# Patient Record
Sex: MALE | Race: OTHER | HISPANIC OR LATINO | Employment: UNEMPLOYED | URBAN - METROPOLITAN AREA
[De-identification: names, ages, dates, MRNs, and addresses within clinical notes are randomized per-mention and may not be internally consistent; named-entity substitution may affect disease eponyms.]

---
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
## 2022-01-05 ENCOUNTER — HOSPITAL ENCOUNTER (INPATIENT)
Facility: HOSPITAL | Age: 22
LOS: 12 days | Discharge: HOME | DRG: 750 | End: 2022-01-18
Attending: EMERGENCY MEDICINE | Admitting: STUDENT IN AN ORGANIZED HEALTH CARE EDUCATION/TRAINING PROGRAM
Payer: COMMERCIAL

## 2022-01-05 DIAGNOSIS — Z00.8 ENCOUNTER FOR PSYCHOLOGICAL EVALUATION: Primary | ICD-10-CM

## 2022-01-05 DIAGNOSIS — G47.00 INSOMNIA: ICD-10-CM

## 2022-01-05 DIAGNOSIS — F19.959 SUBSTANCE-INDUCED PSYCHOTIC DISORDER (HCC): ICD-10-CM

## 2022-01-05 DIAGNOSIS — F20.9 SCHIZOPHRENIA (HCC): ICD-10-CM

## 2022-01-05 LAB
ETHANOL EXG-MCNC: 0 MG/DL
FLUAV RNA RESP QL NAA+PROBE: NEGATIVE
FLUBV RNA RESP QL NAA+PROBE: NEGATIVE
RSV RNA RESP QL NAA+PROBE: NEGATIVE
SARS-COV-2 RNA RESP QL NAA+PROBE: NEGATIVE

## 2022-01-05 PROCEDURE — 96372 THER/PROPH/DIAG INJ SC/IM: CPT

## 2022-01-05 PROCEDURE — 99285 EMERGENCY DEPT VISIT HI MDM: CPT

## 2022-01-05 PROCEDURE — 99285 EMERGENCY DEPT VISIT HI MDM: CPT | Performed by: EMERGENCY MEDICINE

## 2022-01-05 PROCEDURE — 82075 ASSAY OF BREATH ETHANOL: CPT | Performed by: EMERGENCY MEDICINE

## 2022-01-05 PROCEDURE — 0241U HB NFCT DS VIR RESP RNA 4 TRGT: CPT | Performed by: EMERGENCY MEDICINE

## 2022-01-05 RX ORDER — HALOPERIDOL 5 MG
5 TABLET ORAL ONCE
Status: COMPLETED | OUTPATIENT
Start: 2022-01-05 | End: 2022-01-05

## 2022-01-05 RX ORDER — BENZTROPINE MESYLATE 1 MG/1
1 TABLET ORAL ONCE
Status: COMPLETED | OUTPATIENT
Start: 2022-01-05 | End: 2022-01-05

## 2022-01-05 RX ORDER — HALOPERIDOL 5 MG/ML
10 INJECTION INTRAMUSCULAR ONCE
Status: COMPLETED | OUTPATIENT
Start: 2022-01-05 | End: 2022-01-05

## 2022-01-05 RX ORDER — LORAZEPAM 2 MG/ML
2 INJECTION INTRAMUSCULAR ONCE
Status: COMPLETED | OUTPATIENT
Start: 2022-01-05 | End: 2022-01-05

## 2022-01-05 RX ORDER — LORAZEPAM 1 MG/1
2 TABLET ORAL ONCE
Status: COMPLETED | OUTPATIENT
Start: 2022-01-05 | End: 2022-01-05

## 2022-01-05 RX ORDER — BENZTROPINE MESYLATE 1 MG/ML
2 INJECTION INTRAMUSCULAR; INTRAVENOUS ONCE
Status: COMPLETED | OUTPATIENT
Start: 2022-01-05 | End: 2022-01-05

## 2022-01-05 RX ADMIN — BENZTROPINE MESYLATE 2 MG: 1 INJECTION INTRAMUSCULAR; INTRAVENOUS at 03:52

## 2022-01-05 RX ADMIN — BENZTROPINE MESYLATE 1 MG: 1 TABLET ORAL at 23:20

## 2022-01-05 RX ADMIN — HALOPERIDOL LACTATE 10 MG: 5 INJECTION, SOLUTION INTRAMUSCULAR at 03:52

## 2022-01-05 RX ADMIN — HALOPERIDOL 5 MG: 5 TABLET ORAL at 23:20

## 2022-01-05 RX ADMIN — LORAZEPAM 2 MG: 2 INJECTION INTRAMUSCULAR; INTRAVENOUS at 03:52

## 2022-01-05 RX ADMIN — LORAZEPAM 2 MG: 1 TABLET ORAL at 23:20

## 2022-01-05 NOTE — ED NOTES
Per [facility] ([facility]) AT [place]- Call back when patient wakes up     9:52am- Dutch Beech to check on patient- Still asleep  1:1 reports wakes up intermittently but mostly asleep  1:1 will contact  Crisis when patient wakes up      Bharath Gerber  Crisis Worker, [place]  01/05/22

## 2022-01-05 NOTE — ED NOTES
[facility] Crisis to inform them that the patient is awake  Letty Null reported she will be over to see the patient shortly      Georgette Alvarez  Crisis Worker, [place]  01/05/22

## 2022-01-05 NOTE — ED NOTES
Awakened attempt to urinate; states in ER, falling back to sleep while RN asking questions as to what was happening and why he came here       Mulu Queen RN  01/05/22 9220

## 2022-01-05 NOTE — ED NOTES
[facility] crisis in to inform patient of 302 status; security at doorway  Patient laying on litter with new sheet and blanket  Calm, cooperative       Gaurav Urena RN  01/05/22 0505

## 2022-01-05 NOTE — ED NOTES
Patient was brought to the ED with multiple people unknown who all was involved in the event but he was fighting with these individuals in the waiting room  At that time patient was fighting with the individuals in the waiting room and refusing to come into the ED  [employer] was called to take control of the situation in the waiting room and security was out in the waiting room at the same time  Patient was detained by [employer] and security and carried into the department and was placed into 4 point vinyl restraints and medicated       Rc Baez RN  01/05/22 0348

## 2022-01-05 NOTE — ED NOTES
Crisis Worker met with the patient to complete the Crisis Intake and the Safety Risk Assessment  Crisis Worker also met with [facility] ([facility]) AT [place], and the patient's mom who would like to petition a 36  Translation (Gambian/English) completed by an ED staff member  Patient was brought to the Emergency Department with the assistance of the [facility]  Patient was not willing to enter the Emergency Department  When patient was in the emergency department he had to be medicated and restrained  After waking up in the morning the patient was able to be released from the restraints  Patient currently denies suicidal ideation, homicidal ideation, as well as auditory and visual hallucinations  Patient not forthcoming as to what occurred that brought him to the Emergency Department, and states he just wants to go home to take a nap  Patient's mother reports that patient became extremely aggressive in the home last night toward his siblings, and made threats toward them  Patient is also reported to have made several attempts to jump from a second story window while expressing suicidal ideation, but was stopped by his siblings  Patient's mom reports that patient has a history of mental  health treatment, and was hospitalized early in 2021  Patient is reported to have been taking medications following that inpatient treatment for a few months, but stopped taking the medications after that  Patient's mother reports that the patient does have visual hallucinations, and he has said he sees a man that the family reports is not there  Patient is reported to be recently unemployed and no longer works at [employer] where he had been employed  Patient's mother reports that the patient is currently under the supervision of a  as the result of a DUI  Patient's mother reports that the patient does not have access to firearms   Patient's mother reports that he is welcome to return home after inpatient treatment  Patient is not willing to sign a 201, and patient's mother is petitioning a 302  [place] from [address] [place] [address] present and filling out the 302 paperwork      Ophelia Lane  Crisis Worker, [place]  01/05/22

## 2022-01-05 NOTE — ED NOTES
Insurance Authorization for Admission:  Phone Call Placed to [facility]  Phone Number [phone]  Spoke to Aaron  3 Days Approved  Level of Care Mental Health Inpatient  Review on **    Authorization #Facility to call when bed assigned    EVS (Eligibility Verification System) Called- 0   Praveen Weiss with [facility] [address] Worker, [place]  01/05/22

## 2022-01-05 NOTE — ED NOTES
Met with patient to go over rights concerning 302  Patient states he understands rights  Patient does not have any additional questions at this time      Derick Huggins  Crisis Worker, [facility]  01/05/22

## 2022-01-05 NOTE — ED NOTES
Ankle restraints taken off; patient awake and states he doesn't want to talk about what happened; states he is going to go home; asked if he would stay in the room when wrist restraints off and states he is going to go home; assist by tech with urinal  Will have patient reassessed with charge RN  Wrist restraints removed and patient changed into paper scrubs  Calm, but refusal of food and drink at this time; states knows when body needs to eat and sleep  States still wants to go home, but voiced understanding of need to stay in room  1:1 continued observation       Robina Tineo RN  01/05/22 0063 [facility] Chas Oliver RN  01/05/22 4654

## 2022-01-05 NOTE — ED NOTES
Continues in 4 point restraint; noted to pull against restraints and then sleep  Continuing with 1:1 and assessing for patient safety behavior and cooperative comprehension before release of restraints       Andre Giordano RN  01/05/22 0456

## 2022-01-05 NOTE — ED NOTES
Pt was brought to the ED via private vehicle from home following making several suicidal and homicidal statements as well as attempting to jump out of a window  Pt would not willingly get out of the vehicle and came in with pants and shoes only  [employer] was contacted after several attempts to register Pt  Several [employer] officers carried the Pt into the ED where he was medicated and restrained  Pt is not cooperative at this time and does not appear to understand 201 vs 302  Pt mother Seth Madrigal is willing to petition a 36  [facility] ([facility]) AT [place] was contacted, Pool returned call  Due to Pt being sedated, she recommended that Pt mother return at [address] [place] [address] to complete the petition if Pt is still unwilling to sign a 201 at that time  Seth Madrigal- mother- [phone]  Jacquie Blizzard- father- Scarlet Rand- brother- [phone]    Pt mother will require a  when completing 36  She appears to understand English spoken to her, but not to speak English to complete a 302

## 2022-01-05 NOTE — ED CARE HANDOFF
Emergency Department Sign Out Note        Sign out and transfer of care from Dr Khushboo Barajas  See Separate Emergency Department note  The patient, Radha Worley, was evaluated by the previous provider for Psych  Workup Completed:  Medical clearance    ED Course / Workup Pending (followup):  Nancykatty to [facility] for 302 crisis evaluation  They did not arrive overnight  Per Dr Mark Salazar, grounds for upholding [phone] once Chelle arrives  ED Course as of 01/05/22 0802   Wed Jan 05, 2022   2317 Plan for 302 via Chelle crisis  Procedures  MDM        Disposition  Final diagnoses:   Encounter for psychological evaluation     Time reflects when diagnosis was documented in both MDM as applicable and the Disposition within this note     Time User Action Codes Description Comment    1/5/2022  6:55 AM Albina Mogelacio Add [Z00 8] Encounter for psychological evaluation       ED Disposition     ED Disposition Condition Date/Time Comment    Transfer to Nils Merida 7066 Jan 5, 2022  6:55 AM Radha Worley should be transferred out to [facility] and has been medically cleared  Follow-up Information    None       Patient's Medications   Discharge Prescriptions    No medications on file     No discharge procedures on file         ED Provider  Electronically Signed by     Kerline Queen DO  01/05/22 0444

## 2022-01-05 NOTE — ED NOTES
Patient under review at [facility]    There are currently no beds at [facility] per Martínez Dill per Chelsy Jacobs per Aida Roman

## 2022-01-05 NOTE — ED NOTES
Patient in constant motion, yelling out for Дмитрий Odonnell continuously       Melanie Montoya RN  01/05/22 5043 1

## 2022-01-05 NOTE — ED NOTES
1:1 observation in progress related to patient being in restraints       Avis Tavarez RN  01/05/22 6676
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
## 2022-01-05 NOTE — ED NOTES
[facility]- No beds  [address]- No beds    Mathew Wright  Crisis Worker, [facility]  01/05/22

## 2022-01-05 NOTE — ED NOTES
Per [employer] patient's mother is in waiting room and per mother patient has been breaking things earlier today, threatening to hurt himself and others  He tried to jump out of a window earlier today unknown what floor the window was on  Father stated the he has been here about 1 year ago was on medications at that time, unknown if he is taking medications at this time  Mother willing to sign 302 papers if we need that to be done       Marcellus Traore RN  01/05/22 9197

## 2022-01-05 NOTE — RESTRAINT FACE TO FACE
Restraint Face to Face   Shanta Kitchen 25 y o  male MRN: [id number]  Unit/Bed#: ED 12 Encounter: [id number]      Physical Evaluation agitated  Purpose for Restraints/ Seclusion High risk for self harm  Patient's reaction to the intervention less agitation  Patient's medical condition stable  Patient's Behavioral condition angry  Restraints to be Continued

## 2022-01-05 NOTE — ED NOTES
Nasal swab obtained by Jossy Hammond but patient was fighting while she did this, security still remains at bedside       Marcellus Traore RN  01/05/22 3061

## 2022-01-05 NOTE — RESTRAINT FACE TO FACE
Restraint Face to Face   [facility] 25 y o  male MRN: [id number]  Unit/Bed#: ED 12 Encounter: [id number]      Physical Evaluation agitated  Purpose for Restraints/ Seclusion High risk for harm to others  Patient's reaction to the intervention poor  Patient's medical condition stable  Patient's Behavioral condition improving  Restraints to be Continued

## 2022-01-06 PROBLEM — F39 UNSPECIFIED MOOD (AFFECTIVE) DISORDER (HCC): Status: ACTIVE | Noted: 2022-01-06

## 2022-01-06 PROBLEM — Z72.0 TOBACCO ABUSE: Status: ACTIVE | Noted: 2022-01-06

## 2022-01-06 LAB
AMPHETAMINES SERPL QL SCN: NEGATIVE
ATRIAL RATE: 88 BPM
BARBITURATES UR QL: NEGATIVE
BENZODIAZ UR QL: NEGATIVE
COCAINE UR QL: NEGATIVE
METHADONE UR QL: NEGATIVE
OPIATES UR QL SCN: NEGATIVE
OXYCODONE+OXYMORPHONE UR QL SCN: NEGATIVE
P AXIS: 53 DEGREES
PCP UR QL: NEGATIVE
PR INTERVAL: 126 MS
QRS AXIS: 80 DEGREES
QRSD INTERVAL: 94 MS
QT INTERVAL: 352 MS
QTC INTERVAL: 425 MS
T WAVE AXIS: 52 DEGREES
THC UR QL: POSITIVE
VENTRICULAR RATE: 88 BPM

## 2022-01-06 PROCEDURE — 93005 ELECTROCARDIOGRAM TRACING: CPT

## 2022-01-06 PROCEDURE — 93010 ELECTROCARDIOGRAM REPORT: CPT

## 2022-01-06 PROCEDURE — 99252 IP/OBS CONSLTJ NEW/EST SF 35: CPT | Performed by: PHYSICIAN ASSISTANT

## 2022-01-06 PROCEDURE — 80307 DRUG TEST PRSMV CHEM ANLYZR: CPT | Performed by: EMERGENCY MEDICINE

## 2022-01-06 PROCEDURE — 90792 PSYCH DIAG EVAL W/MED SRVCS: CPT | Performed by: STUDENT IN AN ORGANIZED HEALTH CARE EDUCATION/TRAINING PROGRAM

## 2022-01-06 RX ORDER — BENZTROPINE MESYLATE 1 MG/1
1 TABLET ORAL
Status: DISCONTINUED | OUTPATIENT
Start: 2022-01-06 | End: 2022-01-18 | Stop reason: HOSPADM

## 2022-01-06 RX ORDER — POLYETHYLENE GLYCOL 3350 17 G/17G
17 POWDER, FOR SOLUTION ORAL DAILY PRN
Status: DISCONTINUED | OUTPATIENT
Start: 2022-01-06 | End: 2022-01-18 | Stop reason: HOSPADM

## 2022-01-06 RX ORDER — LORAZEPAM 1 MG/1
2 TABLET ORAL EVERY 8 HOURS PRN
Status: DISCONTINUED | OUTPATIENT
Start: 2022-01-06 | End: 2022-01-18 | Stop reason: HOSPADM

## 2022-01-06 RX ORDER — MAGNESIUM HYDROXIDE/ALUMINUM HYDROXICE/SIMETHICONE 120; 1200; 1200 MG/30ML; MG/30ML; MG/30ML
30 SUSPENSION ORAL EVERY 4 HOURS PRN
Status: DISCONTINUED | OUTPATIENT
Start: 2022-01-06 | End: 2022-01-18 | Stop reason: HOSPADM

## 2022-01-06 RX ORDER — IBUPROFEN 400 MG/1
400 TABLET ORAL EVERY 8 HOURS PRN
Status: DISCONTINUED | OUTPATIENT
Start: 2022-01-06 | End: 2022-01-07

## 2022-01-06 RX ORDER — ACETAMINOPHEN 325 MG/1
975 TABLET ORAL EVERY 6 HOURS PRN
Status: DISCONTINUED | OUTPATIENT
Start: 2022-01-06 | End: 2022-01-18 | Stop reason: HOSPADM

## 2022-01-06 RX ORDER — MINERAL OIL AND PETROLATUM 150; 830 MG/G; MG/G
1 OINTMENT OPHTHALMIC
Status: DISCONTINUED | OUTPATIENT
Start: 2022-01-06 | End: 2022-01-18 | Stop reason: HOSPADM

## 2022-01-06 RX ORDER — HALOPERIDOL 5 MG/ML
2.5 INJECTION INTRAMUSCULAR
Status: DISCONTINUED | OUTPATIENT
Start: 2022-01-06 | End: 2022-01-18 | Stop reason: HOSPADM

## 2022-01-06 RX ORDER — LORAZEPAM 2 MG/ML
1 INJECTION INTRAMUSCULAR
Status: DISCONTINUED | OUTPATIENT
Start: 2022-01-06 | End: 2022-01-18 | Stop reason: HOSPADM

## 2022-01-06 RX ORDER — HYDROXYZINE HYDROCHLORIDE 25 MG/1
25 TABLET, FILM COATED ORAL
Status: DISCONTINUED | OUTPATIENT
Start: 2022-01-06 | End: 2022-01-18 | Stop reason: HOSPADM

## 2022-01-06 RX ORDER — HYDROXYZINE 50 MG/1
100 TABLET, FILM COATED ORAL
Status: DISCONTINUED | OUTPATIENT
Start: 2022-01-06 | End: 2022-01-18 | Stop reason: HOSPADM

## 2022-01-06 RX ORDER — HALOPERIDOL 5 MG
5 TABLET ORAL
Status: DISCONTINUED | OUTPATIENT
Start: 2022-01-06 | End: 2022-01-18 | Stop reason: HOSPADM

## 2022-01-06 RX ORDER — ACETAMINOPHEN 325 MG/1
650 TABLET ORAL EVERY 6 HOURS PRN
Status: DISCONTINUED | OUTPATIENT
Start: 2022-01-06 | End: 2022-01-18 | Stop reason: HOSPADM

## 2022-01-06 RX ORDER — HYDROXYZINE 50 MG/1
50 TABLET, FILM COATED ORAL
Status: DISCONTINUED | OUTPATIENT
Start: 2022-01-06 | End: 2022-01-18 | Stop reason: HOSPADM

## 2022-01-06 RX ORDER — AMOXICILLIN 250 MG
1 CAPSULE ORAL DAILY PRN
Status: DISCONTINUED | OUTPATIENT
Start: 2022-01-06 | End: 2022-01-18 | Stop reason: HOSPADM

## 2022-01-06 RX ORDER — HALOPERIDOL 5 MG
5 TABLET ORAL EVERY 8 HOURS PRN
Status: DISCONTINUED | OUTPATIENT
Start: 2022-01-06 | End: 2022-01-18 | Stop reason: HOSPADM

## 2022-01-06 RX ORDER — OLANZAPINE 5 MG/1
5 TABLET, ORALLY DISINTEGRATING ORAL
Status: DISCONTINUED | OUTPATIENT
Start: 2022-01-06 | End: 2022-01-07

## 2022-01-06 RX ORDER — LORAZEPAM 2 MG/ML
2 INJECTION INTRAMUSCULAR
Status: DISCONTINUED | OUTPATIENT
Start: 2022-01-06 | End: 2022-01-18 | Stop reason: HOSPADM

## 2022-01-06 RX ORDER — LORAZEPAM 2 MG/ML
2 INJECTION INTRAMUSCULAR EVERY 6 HOURS PRN
Status: DISCONTINUED | OUTPATIENT
Start: 2022-01-06 | End: 2022-01-18 | Stop reason: HOSPADM

## 2022-01-06 RX ORDER — BENZTROPINE MESYLATE 1 MG/ML
1 INJECTION INTRAMUSCULAR; INTRAVENOUS
Status: DISCONTINUED | OUTPATIENT
Start: 2022-01-06 | End: 2022-01-18 | Stop reason: HOSPADM

## 2022-01-06 RX ORDER — LANOLIN ALCOHOL/MO/W.PET/CERES
3 CREAM (GRAM) TOPICAL
Status: DISCONTINUED | OUTPATIENT
Start: 2022-01-06 | End: 2022-01-13

## 2022-01-06 RX ORDER — HALOPERIDOL 1 MG/1
1 TABLET ORAL EVERY 6 HOURS PRN
Status: DISCONTINUED | OUTPATIENT
Start: 2022-01-06 | End: 2022-01-18 | Stop reason: HOSPADM

## 2022-01-06 RX ORDER — HALOPERIDOL 5 MG/ML
5 INJECTION INTRAMUSCULAR
Status: DISCONTINUED | OUTPATIENT
Start: 2022-01-06 | End: 2022-01-18 | Stop reason: HOSPADM

## 2022-01-06 RX ORDER — TRAZODONE HYDROCHLORIDE 50 MG/1
50 TABLET ORAL
Status: DISCONTINUED | OUTPATIENT
Start: 2022-01-06 | End: 2022-01-13

## 2022-01-06 RX ORDER — BENZTROPINE MESYLATE 1 MG/ML
0.5 INJECTION INTRAMUSCULAR; INTRAVENOUS
Status: DISCONTINUED | OUTPATIENT
Start: 2022-01-06 | End: 2022-01-18 | Stop reason: HOSPADM

## 2022-01-06 RX ORDER — HALOPERIDOL 5 MG/ML
5 INJECTION INTRAMUSCULAR EVERY 6 HOURS PRN
Status: DISCONTINUED | OUTPATIENT
Start: 2022-01-06 | End: 2022-01-18 | Stop reason: HOSPADM

## 2022-01-06 RX ORDER — ACETAMINOPHEN 325 MG/1
650 TABLET ORAL EVERY 4 HOURS PRN
Status: DISCONTINUED | OUTPATIENT
Start: 2022-01-06 | End: 2022-01-18 | Stop reason: HOSPADM

## 2022-01-06 RX ORDER — DIPHENHYDRAMINE HYDROCHLORIDE 50 MG/ML
50 INJECTION INTRAMUSCULAR; INTRAVENOUS EVERY 6 HOURS PRN
Status: DISCONTINUED | OUTPATIENT
Start: 2022-01-06 | End: 2022-01-18 | Stop reason: HOSPADM

## 2022-01-06 RX ORDER — BISACODYL 10 MG
10 SUPPOSITORY, RECTAL RECTAL DAILY PRN
Status: DISCONTINUED | OUTPATIENT
Start: 2022-01-06 | End: 2022-01-18 | Stop reason: HOSPADM

## 2022-01-06 RX ORDER — ACETAMINOPHEN 325 MG/1
650 TABLET ORAL EVERY 8 HOURS PRN
Status: DISCONTINUED | OUTPATIENT
Start: 2022-01-06 | End: 2022-01-06 | Stop reason: SDUPTHER

## 2022-01-06 RX ADMIN — TRAZODONE HYDROCHLORIDE 50 MG: 50 TABLET ORAL at 22:34

## 2022-01-06 NOTE — ED NOTES
Patient is accepted at [address] [facility] [address]  Patient is accepted by Dr Bridgett Swanson  Patient may go to the floor at after report         Nurse report is to be called to [phone]  prior to patient transfer

## 2022-01-06 NOTE — ED NOTES
Patient noted to be sleeping on litter with easy, non labored respirations with 1:1 continuing at bedside       Kathleen Cano RN  01/06/22 9575

## 2022-01-06 NOTE — ED NOTES
Patient appears to be sleeping on litter with easy non labored respirations with 1:1 continuing at bedside,  No apparent discomfort for distress is noted       Hayden Darling RN  01/06/22 5238

## 2022-01-06 NOTE — ED NOTES
Spoke with Gideon Brambila from [place] regarding bed search:    [facility]- bed available  [facility]- bed available  [facility]- [facility]  [facility]- potential discharge bed available    Clinical faxed to listed facilities  Crisis to follow up/continue bed search if needed

## 2022-01-06 NOTE — ED NOTES
Insurance Authorization for admission:   Phone call placed to [employer]   Phone number:[phone]   Spoke to Inez Campos    3 days approved  1/6-1/8  Level of care: inpatient mental health   Review on 1/8     Authorization #  [id number]
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
## 2022-01-06 NOTE — ED NOTES
Patient appears to be sleeping with easy non labored respirations with 1:1 continuing at bedside after receiving medication  No apparent discomfort or distress noted       Bryan Monday, ALEXANDREA  01/06/22 7760

## 2022-01-06 NOTE — ED NOTES
Report received from previous nurse, pt is comfortable on litter, reading, 1:1 staff member at bedside        Yasemin Pathak RN  01/06/22 8070

## 2022-01-07 PROBLEM — F29 PSYCHOTIC DISORDER (HCC): Chronic | Status: ACTIVE | Noted: 2022-01-06

## 2022-01-07 PROBLEM — F29 PSYCHOTIC DISORDER (HCC): Status: ACTIVE | Noted: 2022-01-06

## 2022-01-07 LAB
25(OH)D3 SERPL-MCNC: 16.5 NG/ML (ref 30–100)
ALBUMIN SERPL BCP-MCNC: 4.5 G/DL (ref 3–5.2)
ALP SERPL-CCNC: 57 U/L (ref 43–122)
ALT SERPL W P-5'-P-CCNC: 22 U/L
ANION GAP SERPL CALCULATED.3IONS-SCNC: 9 MMOL/L (ref 5–14)
AST SERPL W P-5'-P-CCNC: 29 U/L (ref 17–59)
ATRIAL RATE: 69 BPM
BASOPHILS # BLD AUTO: 0 THOUSANDS/ΜL (ref 0–0.1)
BASOPHILS NFR BLD AUTO: 1 % (ref 0–1)
BILIRUB SERPL-MCNC: 0.63 MG/DL
BUN SERPL-MCNC: 10 MG/DL (ref 5–25)
CALCIUM SERPL-MCNC: 9.1 MG/DL (ref 8.9–10.7)
CHLORIDE SERPL-SCNC: 105 MMOL/L (ref 97–108)
CHOLEST SERPL-MCNC: 123 MG/DL
CO2 SERPL-SCNC: 25 MMOL/L (ref 22–30)
CREAT SERPL-MCNC: 0.66 MG/DL (ref 0.7–1.5)
EOSINOPHIL # BLD AUTO: 0.3 THOUSAND/ΜL (ref 0–0.4)
EOSINOPHIL NFR BLD AUTO: 4 % (ref 0–6)
ERYTHROCYTE [DISTWIDTH] IN BLOOD BY AUTOMATED COUNT: 13.3 %
FOLATE SERPL-MCNC: 13.2 NG/ML (ref 3.1–17.5)
GFR SERPL CREATININE-BSD FRML MDRD: 140 ML/MIN/1.73SQ M
GLUCOSE SERPL-MCNC: 131 MG/DL (ref 70–99)
HCT VFR BLD AUTO: 47.7 % (ref 41–53)
HDLC SERPL-MCNC: 21 MG/DL
HGB BLD-MCNC: 15.7 G/DL (ref 13.5–17.5)
LDLC SERPL CALC-MCNC: 84 MG/DL
LYMPHOCYTES # BLD AUTO: 2.6 THOUSANDS/ΜL (ref 0.5–4)
LYMPHOCYTES NFR BLD AUTO: 34 % (ref 25–45)
MCH RBC QN AUTO: 29.2 PG (ref 26–34)
MCHC RBC AUTO-ENTMCNC: 32.9 G/DL (ref 31–36)
MCV RBC AUTO: 89 FL (ref 80–100)
MONOCYTES # BLD AUTO: 0.7 THOUSAND/ΜL (ref 0.2–0.9)
MONOCYTES NFR BLD AUTO: 9 % (ref 1–10)
NEUTROPHILS # BLD AUTO: 4.1 THOUSANDS/ΜL (ref 1.8–7.8)
NEUTS SEG NFR BLD AUTO: 53 % (ref 45–65)
NONHDLC SERPL-MCNC: 102 MG/DL
P AXIS: -4 DEGREES
PLATELET # BLD AUTO: 232 THOUSANDS/UL (ref 150–450)
PMV BLD AUTO: 6.9 FL (ref 8.9–12.7)
POTASSIUM SERPL-SCNC: 4.1 MMOL/L (ref 3.6–5)
PR INTERVAL: 118 MS
PROT SERPL-MCNC: 8 G/DL (ref 5.9–8.4)
QRS AXIS: 80 DEGREES
QRSD INTERVAL: 90 MS
QT INTERVAL: 378 MS
QTC INTERVAL: 405 MS
RBC # BLD AUTO: 5.38 MILLION/UL (ref 4.5–5.9)
SODIUM SERPL-SCNC: 139 MMOL/L (ref 137–147)
T WAVE AXIS: 59 DEGREES
TRIGL SERPL-MCNC: 92 MG/DL
TSH SERPL DL<=0.05 MIU/L-ACNC: 1.06 UIU/ML (ref 0.47–4.68)
VENTRICULAR RATE: 69 BPM
VIT B12 SERPL-MCNC: 428 PG/ML (ref 100–900)
WBC # BLD AUTO: 7.8 THOUSAND/UL (ref 4.5–11)

## 2022-01-07 PROCEDURE — 82306 VITAMIN D 25 HYDROXY: CPT | Performed by: PSYCHIATRY & NEUROLOGY

## 2022-01-07 PROCEDURE — 93010 ELECTROCARDIOGRAM REPORT: CPT

## 2022-01-07 PROCEDURE — 99222 1ST HOSP IP/OBS MODERATE 55: CPT | Performed by: STUDENT IN AN ORGANIZED HEALTH CARE EDUCATION/TRAINING PROGRAM

## 2022-01-07 PROCEDURE — 85025 COMPLETE CBC W/AUTO DIFF WBC: CPT | Performed by: PSYCHIATRY & NEUROLOGY

## 2022-01-07 PROCEDURE — 80053 COMPREHEN METABOLIC PANEL: CPT | Performed by: PSYCHIATRY & NEUROLOGY

## 2022-01-07 PROCEDURE — 86592 SYPHILIS TEST NON-TREP QUAL: CPT | Performed by: PSYCHIATRY & NEUROLOGY

## 2022-01-07 PROCEDURE — 93005 ELECTROCARDIOGRAM TRACING: CPT

## 2022-01-07 PROCEDURE — 80061 LIPID PANEL: CPT | Performed by: PSYCHIATRY & NEUROLOGY

## 2022-01-07 PROCEDURE — 84443 ASSAY THYROID STIM HORMONE: CPT | Performed by: PSYCHIATRY & NEUROLOGY

## 2022-01-07 PROCEDURE — 82607 VITAMIN B-12: CPT | Performed by: PSYCHIATRY & NEUROLOGY

## 2022-01-07 PROCEDURE — 82746 ASSAY OF FOLIC ACID SERUM: CPT | Performed by: PSYCHIATRY & NEUROLOGY

## 2022-01-07 RX ORDER — IBUPROFEN 400 MG/1
400 TABLET ORAL EVERY 8 HOURS PRN
Status: DISCONTINUED | OUTPATIENT
Start: 2022-01-07 | End: 2022-01-18 | Stop reason: HOSPADM

## 2022-01-07 RX ORDER — RISPERIDONE 1 MG/1
1 TABLET, FILM COATED ORAL
Status: DISCONTINUED | OUTPATIENT
Start: 2022-01-07 | End: 2022-01-08

## 2022-01-07 RX ADMIN — RISPERIDONE 1 MG: 1 TABLET ORAL at 22:05

## 2022-01-07 RX ADMIN — MELATONIN TAB 3 MG 3 MG: 3 TAB at 22:04

## 2022-01-07 NOTE — TREATMENT TEAM
01/07/22 1112   Team Meeting   Meeting Type Tx Team Meeting   Initial Conference Date 01/06/22   Next Conference Date 02/05/22   Team Members Present   Team Members Present Physician;Nurse;   Physician Team Member Concetta   Nursing Team Member Joe   Care Management Team Member Alejandra   Patient/Family Present   Patient Present Yes   Patient's Family Present No     Treatment plan reviewed  Pt is in agreement and signed treatment plan  A copy of this plan is placed in patient's chart  Continue to monitor

## 2022-01-07 NOTE — PLAN OF CARE
Problem: SELF HARM/SUICIDALITY  Goal: Will have no self-injury during hospital stay  Description: INTERVENTIONS:  - Q 15 MINUTES: Routine safety checks  - Q WAKING SHIFT & PRN: Assess risk to determine if routine checks are adequate to maintain patient safety  - Encourage patient to participate actively in care by formulating a plan to combat response to suicidal ideation, identify supports and resources  Outcome: Not Progressing     Problem: DEPRESSION  Goal: Will be euthymic at discharge  Description: INTERVENTIONS:  - Administer medication as ordered  - Provide emotional support via 1:1 interaction with staff  - Encourage involvement in milieu/groups/activities  - Monitor for social isolation  Outcome: Not Progressing     Problem: PSYCHOSIS  Goal: Will report no hallucinations or delusions  Description: Interventions:  - Administer medication as  ordered  - Every waking shifts and PRN assess for the presence of hallucinations and or delusions  - Assist with reality testing to support increasing orientation  - Assess if patient's hallucinations or delusions are encouraging self-harm or harm to others and intervene as appropriate  Outcome: Not Progressing     Problem: ANXIETY  Goal: Will report anxiety at manageable levels  Description: INTERVENTIONS:  - Administer medication as ordered  - Teach and encourage coping skills  - Provide emotional support  - Assess patient/family for anxiety and ability to cope  Outcome: Not Progressing  Goal: By discharge: Patient will verbalize 2 strategies to deal with anxiety  Description: Interventions:  - Identify any obvious source/trigger to anxiety  - Staff will assist patient in applying identified coping technique/skills  - Encourage attendance of scheduled groups and activities  Outcome: Not Progressing     Problem: SUBSTANCE USE/ABUSE  Goal: Will have no detox symptoms and will verbalize plan for changing substance-related behavior  Description: INTERVENTIONS:  - Monitor physical status and assess for symptoms of withdrawal  - Administer medication as ordered  - Provide emotional support with 1 on 1 interaction with staff  - Encourage recovery focused program/ addiction education  - Assess for verbalization of changing behaviors related to substance abuse  - Initiate consults and referrals as appropriate (Case Management, Spiritual Care, etc )  Outcome: Not Progressing  Goal: By discharge, will develop insight into their chemical dependency and sustain motivation to continue in recovery  Description: INTERVENTIONS:  - Attends all daily group sessions and scheduled AA groups  - Actively practices coping skills through participation in the therapeutic community and adherence to program rules  - Reviews and completes assignments from individual treatment plan  - Assist patient development of understanding of their personal cycle of addiction and relapse triggers  Outcome: Not Progressing  Goal: By discharge, patient will have ongoing treatment plan addressing chemical dependency  Description: INTERVENTIONS:  - Assist patient with resources and/or appointments for ongoing recovery based living  Outcome: Not Progressing     Problem: SLEEP DISTURBANCE  Goal: Will exhibit normal sleeping pattern  Description: Interventions:  -  Assess the patients sleep pattern, noting recent changes  - Administer medication as ordered  - Decrease environmental stimuli, including noise, as appropriate during the night  - Encourage the patient to actively participate in unit groups and or exercise during the day to enhance ability to achieve adequate sleep at night  - Assess the patient, in the morning, encouraging a description of sleep experience  Outcome: Not Progressing     Problem: SAFETY, RESTRAINT - VIOLENT/SELF-DESTRUCTIVE  Goal: Remains free of harm/injury from restraints (Restraint for Violent/Self-Destructive Behavior)  Description: INTERVENTIONS:  - Instruct patient/family regarding restraint use   - Assess and monitor physiologic and psychological status   - Provide interventions and comfort measures to meet assessed patient needs   - Ensure continuous in person monitoring is provided   - Identify and implement measures to help patient regain control  - Assess readiness for release of restraint  Outcome: Not Progressing  Goal: Returns to optimal restraint-free functioning  Description: INTERVENTIONS:  - Assess the patient's behavior and symptoms that indicate continued need for restraint  - Identify and implement measures to help patient regain control  - Assess readiness for release of restraint   Outcome: Not Progressing     Problem: DISCHARGE PLANNING  Goal: Discharge to home or other facility with appropriate resources  Description: INTERVENTIONS:  - Identify barriers to discharge w/patient and caregiver  - Arrange for needed discharge resources and transportation as appropriate  - Identify discharge learning needs (meds, wound care, etc )  - Arrange for interpretive services to assist at discharge as needed  - Refer to Case Management Department for coordinating discharge planning if the patient needs post-hospital services based on physician/advanced practitioner order or complex needs related to functional status, cognitive ability, or social support system  Outcome: Not Progressing

## 2022-01-07 NOTE — PLAN OF CARE
Problem: SELF HARM/SUICIDALITY  Goal: Will have no self-injury during hospital stay  Description: INTERVENTIONS:  - Q 15 MINUTES: Routine safety checks  - Q WAKING SHIFT & PRN: Assess risk to determine if routine checks are adequate to maintain patient safety  - Encourage patient to participate actively in care by formulating a plan to combat response to suicidal ideation, identify supports and resources  Outcome: Not Progressing     Problem: DEPRESSION  Goal: Will be euthymic at discharge  Description: INTERVENTIONS:  - Administer medication as ordered  - Provide emotional support via 1:1 interaction with staff  - Encourage involvement in milieu/groups/activities  - Monitor for social isolation  Outcome: Not Progressing     Problem: PSYCHOSIS  Goal: Will report no hallucinations or delusions  Description: Interventions:  - Administer medication as  ordered  - Every waking shifts and PRN assess for the presence of hallucinations and or delusions  - Assist with reality testing to support increasing orientation  - Assess if patient's hallucinations or delusions are encouraging self-harm or harm to others and intervene as appropriate  Outcome: Not Progressing     Problem: ANXIETY  Goal: Will report anxiety at manageable levels  Description: INTERVENTIONS:  - Administer medication as ordered  - Teach and encourage coping skills  - Provide emotional support  - Assess patient/family for anxiety and ability to cope  Outcome: Not Progressing  Goal: By discharge: Patient will verbalize 2 strategies to deal with anxiety  Description: Interventions:  - Identify any obvious source/trigger to anxiety  - Staff will assist patient in applying identified coping technique/skills  - Encourage attendance of scheduled groups and activities  Outcome: Not Progressing     Problem: SUBSTANCE USE/ABUSE  Goal: Will have no detox symptoms and will verbalize plan for changing substance-related behavior  Description: INTERVENTIONS:  - Monitor physical status and assess for symptoms of withdrawal  - Administer medication as ordered  - Provide emotional support with 1 on 1 interaction with staff  - Encourage recovery focused program/ addiction education  - Assess for verbalization of changing behaviors related to substance abuse  - Initiate consults and referrals as appropriate (Case Management, Spiritual Care, etc )  Outcome: Not Progressing  Goal: By discharge, will develop insight into their chemical dependency and sustain motivation to continue in recovery  Description: INTERVENTIONS:  - Attends all daily group sessions and scheduled AA groups  - Actively practices coping skills through participation in the therapeutic community and adherence to program rules  - Reviews and completes assignments from individual treatment plan  - Assist patient development of understanding of their personal cycle of addiction and relapse triggers  Outcome: Not Progressing  Goal: By discharge, patient will have ongoing treatment plan addressing chemical dependency  Description: INTERVENTIONS:  - Assist patient with resources and/or appointments for ongoing recovery based living  Outcome: Not Progressing     Problem: SLEEP DISTURBANCE  Goal: Will exhibit normal sleeping pattern  Description: Interventions:  -  Assess the patients sleep pattern, noting recent changes  - Administer medication as ordered  - Decrease environmental stimuli, including noise, as appropriate during the night  - Encourage the patient to actively participate in unit groups and or exercise during the day to enhance ability to achieve adequate sleep at night  - Assess the patient, in the morning, encouraging a description of sleep experience  Outcome: Not Progressing

## 2022-01-07 NOTE — PROGRESS NOTES
01/07/22 1235   Patient Intake   Special Needs Pateint is bilingual, speaks Ghanaian and English   Living Arrangement Lives with someone  (Single, unempoyed, lives with mother and twin brother in house )   Can patient return home? Yes   Address to be Discharge to: [facility] [address] [place]   Patient's Telephone Number [phone]   Access to Firearms No   Work History Unemployed   School Grade/Year 12th   Admission Status   Status of Admission 201  (pt was brought in by family due to paranoia, auditory hallucinations, and suicidal ideation  pt was restrained in ED due to agression )   Patient History   Treatment History Prior admission a year ago, poor outpatient compliance  Currently in Treatment No   Medical Problems Pt denied medical issues  refer to chart  Legal Issues DUI two months ago  Pt states his drives license may have been suspended  Indicate type of legal issues present: Other   Substance Abuse Yes (See [facility] [address] History section for detail)  (UDS positive for THC  Pt reports smoking marijuana )   Crisis Info   Release of Information Signed Yes  (Mom, Dad and PCP)     Pt was calm and cooperative during the intake assessment  Pt currently does not have established [facility] treatment providers  Pt is offered ICM and MHOP referral  CM to follow up and once pt signs PATRIZIA make appropriate referrals  Pt acknowledged having issues with anger and difficulty with self control when gets angry  Pt rates recent act of aggression 5/10  However, pt denied having SI, HI and AVH  Pt noted his interest includes Play station, basketball, watching Tv etc      Continue to monitor
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
## 2022-01-07 NOTE — PROGRESS NOTES
Met with Melina Henderson completing his admission paperwork  He states his father brought him into the hospital originally not knowing why  He was able to state it was due to anger, hearing voices, unable to sleep or eat and his parents were concerned  He does not like that others call him crazy or ignore what he says  He wants to learn anger management, to learn how to build relationships, to understand his diagnosis better  At the same time he wants to go home  Patient was cooperative and polite  Encouraged him to attend groups  yes/including labs and imaging.
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
## 2022-01-07 NOTE — ASSESSMENT & PLAN NOTE
· Smokes tobacco via hookah  · Encourage cessation  · Offer nicotine replacement- patient declines at this time

## 2022-01-07 NOTE — NURSING NOTE
Patient visible in the milieu in brief intervals  A bit guarded on interaction  Scant  Denies all symptoms and states that he "doesn't know why he is here"  No behavioral issues  No agitation  Will monitor

## 2022-01-07 NOTE — ASSESSMENT & PLAN NOTE
· Patient originally presented to [facility] ED 01/05/2022 with his family for evaluation of agitative behavior and SI/HIs  · Currently 201 status  · Management per Psychiatry

## 2022-01-07 NOTE — CASE MANAGEMENT
Pt's mother was called at [phone] and provided discharge notification using translation services #[id number]  Mother notes that pt was acting aggressive and bizarre that is why we brought him home  Mother confirmed that patient was in treatment since discharge from the hospital last time  Mother denied any family mental health treatment history  Mother notes that pt will be picked up by one of the family members upon discharge  Mother would like to be involved in patient's discharge planning  Continue to coordinate

## 2022-01-07 NOTE — ASSESSMENT & PLAN NOTE
Patient seen and examined today, medically clear for admission to [facility] admission labs currently pending

## 2022-01-07 NOTE — TREATMENT PLAN
TREATMENT PLAN REVIEW - [facility] [place] 25 y o  2000 male MRN: [id number]    [address] Room / Bed: [facility]/-01 Encounter: [id number]        Admit Date/Time:  1/5/2022  3:48 AM    Treatment Team: Attending Provider: Jean Sen MD; Registered Nurse: Verónica Bryan RN; Care Manager: Bobo Nath; Patient Care Assistant: Joaquin Hutchins;  Registered Nurse: Efren Lanes, RN; : Vanessa Contreras; Patient Care Technician: Nguyen Antony; Patient Care Assistant: Alexis Hwang    Diagnosis: Principal Problem:    Psychotic disorder [facility])  Active Problems:    Substance-induced psychotic disorder [facility])    Medical clearance for psychiatric admission    Tobacco abuse    Patient Strengths/Assets: cooperative, compliant with medication, good past treatment response, good physical health, good support system, supportive family, supportive friends      Patient Barriers/Limitations: limited insight, limited motivation, noncompliant with medication    Short Term Goals: decrease in paranoid thoughts, decrease in psychotic symptoms, decrease in suicidal thoughts, improvement in insight, improvement in reasoning ability, sleep improvement, improvement in appetite, tolerate medications, increase in socialization with peers on the unit, acceptance of need for psychiatric treatment, acceptance of psychiatric medications    Long Term Goals: free of suicidal thoughts, free of homicidal thoughts, resolution of psychotic symptoms, no agitation on the unit, no aggressive behavior on the unit, acceptance of need for psychiatric medications, acceptance of need for psychiatric treatment, acceptance of need for psychiatric follow up after discharge, acceptance of psychiatric diagnosis, adequate sleep, adequate appetite, adequate oral intake, appropriate interaction with peers    Progress Towards Goals: starting psychiatric medications as prescribed, improving, attends groups, mood is stabilizing, more appropriate, less irritable, less paranoid, no longer suicidal, working on coping skills    Recommended Treatment: medication management, patient medication education, group therapy, milieu therapy, continued Behavioral Health psychiatric evaluation/assessment process     Treatment Frequency: daily medication monitoring, group and milieu therapy daily, monitoring through interdisciplinary rounds, monitoring through weekly patient care conferences    Expected Discharge Date: 10 days - 1/17/2022    Discharge Plan: return to previous living arrangement    Treatment Plan Created/Updated By: Papo Bhardwaj MD

## 2022-01-07 NOTE — PLAN OF CARE
[facility] Group Note    Problem: Ineffective Coping  Goal: Participates in unit activities  Description: Interventions:  - Provide therapeutic environment   - Provide required programming   - Redirect inappropriate behaviors   Outcome: Progressing

## 2022-01-07 NOTE — TREATMENT PLAN
TREATMENT PLAN REVIEW - [facility] [place] 25 y o  2000 male MRN: [id number]    [address] Room / Bed: [place] [address]/[facility] 350-21 Encounter: [id number]          Admit Date/Time:  1/5/2022  3:48 AM    Treatment Team: Attending Provider: Shawn Mayberry MD; Patient Care Assistant: Yelitza Farris; Patient Care Technician: Claudine Marie; Patient Care Assistant: Tisha Billings; Nursing Student: Jessica Maria; Patient Care Technician: Lovely Love; Patient Care Assistant: Vladislav Alfaro;  Licensed Practical Nurse: Therese Nunn LPN    Diagnosis: Principal Problem:    Psychotic disorder ([facility] [address] )  Active Problems:    [facility] )    Medical clearance for psychiatric admission    Tobacco abuse      Patient Strengths/Assets: cooperative, motivation for treatment/growth, negotiates basic needs, patient is on a voluntary commitment    Patient Barriers/Limitations: financial instability, substance abuse    Short Term Goals: decrease in paranoid thoughts, decrease in psychotic symptoms    Long Term Goals: stabilization of mood, resolution of psychotic symptoms    Progress Towards Goals: starting psychiatric medications as prescribed    Recommended Treatment: medication management, patient medication education, group therapy, milieu therapy, continued Behavioral Health psychiatric evaluation/assessment process    Treatment Frequency: daily medication monitoring, group and milieu therapy daily, monitoring through interdisciplinary rounds, monitoring through weekly patient care conferences    Expected Discharge Date:  10 days    Discharge Plan: discharge to family care, referral for outpatient medication management with a psychiatrist, referral for outpatient psychotherapy    Treatment Plan Created/Updated By: Shawn Mayberry MD
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
## 2022-01-08 PROCEDURE — 99232 SBSQ HOSP IP/OBS MODERATE 35: CPT | Performed by: PSYCHIATRY & NEUROLOGY

## 2022-01-08 RX ORDER — RISPERIDONE 2 MG/1
2 TABLET, FILM COATED ORAL
Status: DISCONTINUED | OUTPATIENT
Start: 2022-01-08 | End: 2022-01-11

## 2022-01-08 RX ADMIN — MELATONIN TAB 3 MG 3 MG: 3 TAB at 21:59

## 2022-01-08 RX ADMIN — TRAZODONE HYDROCHLORIDE 50 MG: 50 TABLET ORAL at 00:05

## 2022-01-08 RX ADMIN — TRAZODONE HYDROCHLORIDE 50 MG: 50 TABLET ORAL at 22:00

## 2022-01-08 RX ADMIN — RISPERIDONE 2 MG: 2 TABLET ORAL at 21:59

## 2022-01-08 NOTE — PLAN OF CARE
Problem: SELF HARM/SUICIDALITY  Goal: Will have no self-injury during hospital stay  Description: INTERVENTIONS:  - Q 15 MINUTES: Routine safety checks  - Q WAKING SHIFT & PRN: Assess risk to determine if routine checks are adequate to maintain patient safety  - Encourage patient to participate actively in care by formulating a plan to combat response to suicidal ideation, identify supports and resources  Outcome: Progressing     Problem: DEPRESSION  Goal: Will be euthymic at discharge  Description: INTERVENTIONS:  - Administer medication as ordered  - Provide emotional support via 1:1 interaction with staff  - Encourage involvement in milieu/groups/activities  - Monitor for social isolation  Outcome: Progressing     Problem: PSYCHOSIS  Goal: Will report no hallucinations or delusions  Description: Interventions:  - Administer medication as  ordered  - Every waking shifts and PRN assess for the presence of hallucinations and or delusions  - Assist with reality testing to support increasing orientation  - Assess if patient's hallucinations or delusions are encouraging self-harm or harm to others and intervene as appropriate  Outcome: Progressing     Problem: ANXIETY  Goal: Will report anxiety at manageable levels  Description: INTERVENTIONS:  - Administer medication as ordered  - Teach and encourage coping skills  - Provide emotional support  - Assess patient/family for anxiety and ability to cope  Outcome: Progressing  Goal: By discharge: Patient will verbalize 2 strategies to deal with anxiety  Description: Interventions:  - Identify any obvious source/trigger to anxiety  - Staff will assist patient in applying identified coping technique/skills  - Encourage attendance of scheduled groups and activities  Outcome: Progressing     Problem: SUBSTANCE USE/ABUSE  Goal: Will have no detox symptoms and will verbalize plan for changing substance-related behavior  Description: INTERVENTIONS:  - Monitor physical status and assess for symptoms of withdrawal  - Administer medication as ordered  - Provide emotional support with 1 on 1 interaction with staff  - Encourage recovery focused program/ addiction education  - Assess for verbalization of changing behaviors related to substance abuse  - Initiate consults and referrals as appropriate (Case Management, Spiritual Care, etc )  Outcome: Progressing  Goal: By discharge, will develop insight into their chemical dependency and sustain motivation to continue in recovery  Description: INTERVENTIONS:  - Attends all daily group sessions and scheduled AA groups  - Actively practices coping skills through participation in the therapeutic community and adherence to program rules  - Reviews and completes assignments from individual treatment plan  - Assist patient development of understanding of their personal cycle of addiction and relapse triggers  Outcome: Progressing  Goal: By discharge, patient will have ongoing treatment plan addressing chemical dependency  Description: INTERVENTIONS:  - Assist patient with resources and/or appointments for ongoing recovery based living  Outcome: Progressing     Problem: SLEEP DISTURBANCE  Goal: Will exhibit normal sleeping pattern  Description: Interventions:  -  Assess the patients sleep pattern, noting recent changes  - Administer medication as ordered  - Decrease environmental stimuli, including noise, as appropriate during the night  - Encourage the patient to actively participate in unit groups and or exercise during the day to enhance ability to achieve adequate sleep at night  - Assess the patient, in the morning, encouraging a description of sleep experience  Outcome: Progressing     Problem: SAFETY, RESTRAINT - VIOLENT/SELF-DESTRUCTIVE  Goal: Remains free of harm/injury from restraints (Restraint for Violent/Self-Destructive Behavior)  Description: INTERVENTIONS:  - Instruct patient/family regarding restraint use   - Assess and monitor physiologic and psychological status   - Provide interventions and comfort measures to meet assessed patient needs   - Ensure continuous in person monitoring is provided   - Identify and implement measures to help patient regain control  - Assess readiness for release of restraint  Outcome: Progressing  Goal: Returns to optimal restraint-free functioning  Description: INTERVENTIONS:  - Assess the patient's behavior and symptoms that indicate continued need for restraint  - Identify and implement measures to help patient regain control  - Assess readiness for release of restraint   Outcome: Progressing     Problem: Ineffective Coping  Goal: Participates in unit activities  Description: Interventions:  - Provide therapeutic environment   - Provide required programming   - Redirect inappropriate behaviors   Outcome: Not Progressing

## 2022-01-08 NOTE — NURSING NOTE
Sekou Oakley is pleasant upon approach  Denies depression and anxiety  Denies HI/SI/AH/VH  He is visible and social on the unit  He is isolative to room, but comes out to have needs met  meal compliant

## 2022-01-08 NOTE — NURSING NOTE
Pt calm and cooperative  Pt can be seen socializing with peers and staff  Pt denies s/s of AH/VH and SI/HI  Pt denies depression and anxiety at this time  Will continue to monitor

## 2022-01-08 NOTE — PROGRESS NOTES
Progress Note - [address] [facility],[address] 25 y o  male MRN: [id number]  Unit/Bed#: -02 Encounter: [id number]    Assessment/Plan   Principal Problem:    Psychotic disorder ([address] )  Active Problems:    Medical clearance for psychiatric admission    Tobacco abuse       Increase Risperdal from 1 mg q h s  to 2 mg q h s  for improved control of psychosis and sleep with plan for long-acting injectable   Continue to promote patient participation in therapeutic milieu   Continue medical management by primary team    Discharge disposition:  titrating medication, symptoms persist, continues to require inpatient psychiatric hospitalization    Subjective: The patient was evaluated this morning for continuity of care and no acute distress noted throughout the evaluation  Over the past 24 hours staff noted the patient was visible on the unit but guarded and scant, unaware about his reason for hospitalization  The patient was meal and medication compliant  The patient received p r n  Trazodone 50 mg overnight for insomnia which was effective for symptoms  Today on exam the patient was coloring when approached and was superficially cooperative, denying all symptoms, and scant in conversation  When asked about his reason for hospitalization he responded I am not sure    The patient endorsed good mood, good sleep, good appetite, and 10/10 energy  He denies suicidal or homicidal ideations, denied auditory or visual hallucinations, and denied paranoid delusions  Denied any adverse effects from his medication  Discussed increasing Risperdal from 1 mg q h s  to 2 mg q h s  And patient was in agreement with plan      Current Medications:  Current Facility-Administered Medications   Medication Dose Route Frequency Provider Last Rate    acetaminophen  650 mg Oral Q6H PRN Alcides Iverson MD      acetaminophen  650 mg Oral Q4H PRN Alcides Iverson MD      acetaminophen  975 mg Oral Q6H PRN Melissa Santiago Sumit Benson MD      aluminum-magnesium hydroxide-simethicone  30 mL Oral Q4H PRN John Garnica MD      artificial tear  1 application Both Eyes F0Y PRN John Garnica MD      haloperidol lactate  2 5 mg Intramuscular Q4H PRN Max 4/day John Garnica MD      And    LORazepam  1 mg Intramuscular Q4H PRN Max 4/day John Garnica MD      And    benztropine  0 5 mg Intramuscular Q4H PRN Max 4/day John Garnica MD      haloperidol lactate  5 mg Intramuscular Q4H PRN Max 4/day John Garnica MD      And    LORazepam  2 mg Intramuscular Q4H PRN Max 4/day John Garnica MD      And    benztropine  1 mg Intramuscular Q4H PRN Max 4/day John Garnica MD      benztropine  1 mg Oral Q4H PRN Max 6/day John Garnica MD      bisacodyl  10 mg Rectal Daily PRN John Garnica MD      hydrOXYzine HCL  50 mg Oral Q6H PRN Max 4/day John Garnica MD      Or   Phi Valladares diphenhydrAMINE  50 mg Intramuscular Q6H PRN John Garnica MD      haloperidol  1 mg Oral Q6H PRN John Garnica MD      haloperidol  2 5 mg Oral Q4H PRN Max 4/day John Garnica MD      haloperidol  5 mg Oral Q8H PRN Rosa Frankel, DO      haloperidol  5 mg Oral Q4H PRN Max 4/day John Garnica MD      haloperidol lactate  5 mg Intramuscular Q6H PRN Koby Spencer L Bendhalle, DO      hydrOXYzine HCL  100 mg Oral Q6H PRN Max 4/day MD Terell Silva LORazepam  2 mg Intramuscular Q6H PRN John Garnica MD      hydrOXYzine HCL  25 mg Oral Q6H PRN Max 4/day John Garnica MD      ibuprofen  400 mg Oral Q8H PRN Gala Beaufort Bendock, DO      LORazepam  2 mg Intramuscular Q6H PRN Erin L Bendock, DO      LORazepam  2 mg Oral Q8H PRN Erin L Bendock, DO      melatonin  3 mg Oral HS John Garnica MD      polyethylene glycol  17 g Oral Daily PRN John Garnica MD      risperiDONE  2 mg Oral HS DO Nicole Engle-docusate sodium  1 tablet Oral Daily PRN John Garnica MD      traZODone 50 mg Oral HS PRN John Garnica MD         Behavioral Health Medications: all current active meds have been reviewed and planned medication changes: increasing risperdol  Vital signs in last 24 hours:  Temp:  [97 7 °F (36 5 °C)-98 1 °F (36 7 °C)] 97 7 °F (36 5 °C)  HR:  [83-84] 84  Resp:  [16] 16  BP: (127-130)/(59-60) 127/59    Laboratory results:    I have personally reviewed all pertinent laboratory/tests results    Most Recent Labs:   Lab Results   Component Value Date    WBC 7 80 01/07/2022    RBC 5 38 01/07/2022    HGB 15 7 01/07/2022    HCT 47 7 01/07/2022     01/07/2022    RDW 13 3 01/07/2022    NEUTROABS 4 10 01/07/2022    SODIUM 139 01/07/2022    K 4 1 01/07/2022     01/07/2022    CO2 25 01/07/2022    BUN 10 01/07/2022    CREATININE 0 66 (L) 01/07/2022    GLUC 131 (H) 01/07/2022    CALCIUM 9 1 01/07/2022    AST 29 01/07/2022    ALT 22 01/07/2022    ALKPHOS 57 01/07/2022    TP 8 0 01/07/2022    ALB 4 5 01/07/2022    TBILI 0 63 01/07/2022    CHOLESTEROL 123 01/07/2022    HDL 21 (L) 01/07/2022    TRIG 92 01/07/2022    LDLCALC 84 01/07/2022    [place] 102 01/07/2022    [id number] 1 060 01/07/2022       Psychiatric Review of Systems:  Behavior over the last 24 hours:  unchanged  Sleep: improved, slept better  Appetite: normal  Medication side effects: No   ROS: no complaints, all other systems are negative    Mental Status Evaluation:    Appearance:  age appropriate, casually dressed, looks stated age   Behavior:  Superficially cooperative, denying all symptoms, coloring   Speech:  slow, scant   Mood:  "good"   Affect:  blunted   Thought Process:  decreased rate of thoughts   Associations: intact associations   Thought Content:  mild paranoia   Perceptual Disturbances: denies auditory or visual hallucinations when asked, but appears preoccupied   Risk Potential: Suicidal ideation - None at present  Homicidal ideation - None at present  Potential for aggression - No   Sensorium:  oriented to person and place   Memory:  recent and remote memory grossly intact   Consciousness:  alert and awake   Attention/Concentration: attention span and concentration appear shorter than expected for age   Insight:  poor   Judgment: limited   Gait/Station: normal gait/station, normal balance   Motor Activity: no abnormal movements         Progress Toward Goals: Unchanged    Recommended Treatment:   See above for assessment and plan  Risks, benefits and possible side effects of Medications:   Risks, benefits, and possible side effects of medications explained to patient and patient verbalizes understanding  This note has been constructed using a voice recognition system  There may be translation, syntax, or grammatical errors  If you have any questions, please contact the dictating provider  SANDRA Engle    Psychiatry PGY-2

## 2022-01-09 PROCEDURE — 99232 SBSQ HOSP IP/OBS MODERATE 35: CPT | Performed by: PSYCHIATRY & NEUROLOGY

## 2022-01-09 RX ADMIN — RISPERIDONE 2 MG: 2 TABLET ORAL at 21:43

## 2022-01-09 RX ADMIN — TRAZODONE HYDROCHLORIDE 50 MG: 50 TABLET ORAL at 21:44

## 2022-01-09 RX ADMIN — MELATONIN TAB 3 MG 3 MG: 3 TAB at 21:43

## 2022-01-09 NOTE — PLAN OF CARE
Problem: SELF HARM/SUICIDALITY  Goal: Will have no self-injury during hospital stay  Description: INTERVENTIONS:  - Q 15 MINUTES: Routine safety checks  - Q WAKING SHIFT & PRN: Assess risk to determine if routine checks are adequate to maintain patient safety  - Encourage patient to participate actively in care by formulating a plan to combat response to suicidal ideation, identify supports and resources  Outcome: Progressing     Problem: DEPRESSION  Goal: Will be euthymic at discharge  Description: INTERVENTIONS:  - Administer medication as ordered  - Provide emotional support via 1:1 interaction with staff  - Encourage involvement in milieu/groups/activities  - Monitor for social isolation  Outcome: Progressing     Problem: PSYCHOSIS  Goal: Will report no hallucinations or delusions  Description: Interventions:  - Administer medication as  ordered  - Every waking shifts and PRN assess for the presence of hallucinations and or delusions  - Assist with reality testing to support increasing orientation  - Assess if patient's hallucinations or delusions are encouraging self-harm or harm to others and intervene as appropriate  Outcome: Progressing     Problem: ANXIETY  Goal: Will report anxiety at manageable levels  Description: INTERVENTIONS:  - Administer medication as ordered  - Teach and encourage coping skills  - Provide emotional support  - Assess patient/family for anxiety and ability to cope  Outcome: Progressing  Goal: By discharge: Patient will verbalize 2 strategies to deal with anxiety  Description: Interventions:  - Identify any obvious source/trigger to anxiety  - Staff will assist patient in applying identified coping technique/skills  - Encourage attendance of scheduled groups and activities  Outcome: Progressing     Problem: SUBSTANCE USE/ABUSE  Goal: Will have no detox symptoms and will verbalize plan for changing substance-related behavior  Description: INTERVENTIONS:  - Monitor physical status and assess for symptoms of withdrawal  - Administer medication as ordered  - Provide emotional support with 1 on 1 interaction with staff  - Encourage recovery focused program/ addiction education  - Assess for verbalization of changing behaviors related to substance abuse  - Initiate consults and referrals as appropriate (Case Management, Spiritual Care, etc )  Outcome: Progressing  Goal: By discharge, will develop insight into their chemical dependency and sustain motivation to continue in recovery  Description: INTERVENTIONS:  - Attends all daily group sessions and scheduled AA groups  - Actively practices coping skills through participation in the therapeutic community and adherence to program rules  - Reviews and completes assignments from individual treatment plan  - Assist patient development of understanding of their personal cycle of addiction and relapse triggers  Outcome: Progressing  Goal: By discharge, patient will have ongoing treatment plan addressing chemical dependency  Description: INTERVENTIONS:  - Assist patient with resources and/or appointments for ongoing recovery based living  Outcome: Progressing     Problem: SLEEP DISTURBANCE  Goal: Will exhibit normal sleeping pattern  Description: Interventions:  -  Assess the patients sleep pattern, noting recent changes  - Administer medication as ordered  - Decrease environmental stimuli, including noise, as appropriate during the night  - Encourage the patient to actively participate in unit groups and or exercise during the day to enhance ability to achieve adequate sleep at night  - Assess the patient, in the morning, encouraging a description of sleep experience  Outcome: Progressing     Problem: SAFETY, RESTRAINT - VIOLENT/SELF-DESTRUCTIVE  Goal: Remains free of harm/injury from restraints (Restraint for Violent/Self-Destructive Behavior)  Description: INTERVENTIONS:  - Instruct patient/family regarding restraint use   - Assess and monitor physiologic and psychological status   - Provide interventions and comfort measures to meet assessed patient needs   - Ensure continuous in person monitoring is provided   - Identify and implement measures to help patient regain control  - Assess readiness for release of restraint  Outcome: Progressing  Goal: Returns to optimal restraint-free functioning  Description: INTERVENTIONS:  - Assess the patient's behavior and symptoms that indicate continued need for restraint  - Identify and implement measures to help patient regain control  - Assess readiness for release of restraint   Outcome: Progressing     Problem: Ineffective Coping  Goal: Participates in unit activities  Description: Interventions:  - Provide therapeutic environment   - Provide required programming   - Redirect inappropriate behaviors   Outcome: Progressing

## 2022-01-09 NOTE — NURSING NOTE
Roland Husbands is pleasant upon approach  Denies depression and anxiety  Denies HI/SI/AH/VH  He is visible and social on the unit  Currently in the dayroom, talking to peers   Denies any complaints at this time

## 2022-01-09 NOTE — NURSING NOTE
Trazadone was effective  Patient resting quietly in bed with his eyes closed  Chest movements also noted  No signs of distress

## 2022-01-09 NOTE — NURSING NOTE
Patient was calm and cooperative during interactions with this staff  During 1:1 patient denied SI, HI, hallucinations, and agreed to notify staff immediately if he began to experience any of these symptoms

## 2022-01-09 NOTE — NURSING NOTE
Galina Neal is pleasant and cooperative  He has been social with staff and peers  Currently in the dayroom watching a movie with peers

## 2022-01-09 NOTE — PROGRESS NOTES
Progress Note - [address] [facility] 25 y o  male MRN: [id number]   Unit/Bed#: [facility] [facility] [address]-02 Encounter: [id number]    Behavior over the last 24 hours: improving  Anju Issa is a 14-year-old male with a history of psychotic disorder who presents for psychiatric follow-up  Staff reports no behavioral issues overnight, has been pleasant, visible and social in the milieu  He is calm and cooperative upon approach and reports doing well  He does not appear disorganized, preoccupied or overtly psychotic  He does not verbalize any spontaneous delusional material   He reports tolerating his medications well, but does endorse some constipation, has not requested any PRNs yet  Denies any abdominal discomfort, nausea, vomiting or bloating  He is hopeful for discharge this week, but not pressing the issue, stating I am doing fine  I am just chilling  The medication is helping me    Does remain a bit guarded when asked about the events leading to hospitalization, stating I am just focused on right now      Sleep: normal  Appetite: normal  Medication side effects: No   ROS: all other systems are negative    Mental Status Evaluation:    Appearance:  age appropriate, adequate grooming, dressed in hospital attire   Behavior:  pleasant, cooperative, calm   Speech:  normal rate and volume, fluent, clear   Mood:  euthymic   Affect:  appropriate, reactive   Thought Process:  organized, goal directed, linear   Associations: intact associations   Thought Content:  no overt delusions   Perceptual Disturbances: no auditory hallucinations, no visual hallucinations, does not appear responding to internal stimuli   Risk Potential: Suicidal ideation - None at present  Homicidal ideation - None at present  Potential for aggression - No   Sensorium:  oriented to person, place, time/date and situation   Memory:  recent and remote memory grossly intact   Consciousness:  alert and awake   Attention/Concentration: attention span and concentration are age appropriate   Insight:  fair   Judgment: fair   Gait/Station: normal gait/station   Motor Activity: no abnormal movements     Vital signs in last 24 hours:    Temp:  [97 2 °F (36 2 °C)-98 2 °F (36 8 °C)] 97 2 °F (36 2 °C)  HR:  [62-70] 62  Resp:  [16] 16  BP: (133-138)/(65-71) 133/71    Laboratory results: I have personally reviewed all pertinent laboratory/tests results    Most Recent Labs:   Lab Results   Component Value Date    WBC 7 80 01/07/2022    RBC 5 38 01/07/2022    HGB 15 7 01/07/2022    HCT 47 7 01/07/2022     01/07/2022    RDW 13 3 01/07/2022    NEUTROABS 4 10 01/07/2022    SODIUM 139 01/07/2022    K 4 1 01/07/2022     01/07/2022    CO2 25 01/07/2022    BUN 10 01/07/2022    CREATININE 0 66 (L) 01/07/2022    GLUC 131 (H) 01/07/2022    CALCIUM 9 1 01/07/2022    AST 29 01/07/2022    ALT 22 01/07/2022    ALKPHOS 57 01/07/2022    TP 8 0 01/07/2022    ALB 4 5 01/07/2022    TBILI 0 63 01/07/2022    CHOLESTEROL 123 01/07/2022    HDL 21 (L) 01/07/2022    TRIG 92 01/07/2022    LDLCALC 84 01/07/2022    [place] 102 01/07/2022    [id number] 1 060 01/07/2022       Progress Toward Goals: progressing, consider begininng discharge planning this week    Assessment/Plan   Principal Problem:    Psychotic disorder ([address] )  Active Problems:    Medical clearance for psychiatric admission    Tobacco abuse      Recommended Treatment:     Planned medication and treatment changes:     All current active medications have been reviewed  Encourage group therapy, milieu therapy and occupational therapy  Behavioral Health checks every 7 minutes  Continue current medications:    Current Facility-Administered Medications   Medication Dose Route Frequency Provider Last Rate    acetaminophen  650 mg Oral Q6H PRN Cee Villanueva MD      acetaminophen  650 mg Oral Q4H PRN Cee Villanueva MD      acetaminophen  975 mg Oral Q6H PRN Cee Villanueva MD      aluminum-magnesium hydroxide-simethicone  30 mL Oral Q4H PRN Santa Cruz, MD      artificial tear  1 application Both Eyes A1P PRN Santa Cruz, MD      haloperidol lactate  2 5 mg Intramuscular Q4H PRN Max 4/day Santa Cruz, MD      And    LORazepam  1 mg Intramuscular Q4H PRN Max 4/day Santa Cruz, MD      And    benztropine  0 5 mg Intramuscular Q4H PRN Max 4/day Santa Cruz MD      haloperidol lactate  5 mg Intramuscular Q4H PRN Max 4/day Santa Cruz, MD      And    LORazepam  2 mg Intramuscular Q4H PRN Max 4/day Santa Cruz, MD      And    benztropine  1 mg Intramuscular Q4H PRN Max 4/day Santa Cruz MD      benztropine  1 mg Oral Q4H PRN Max 6/day Santa Cruz, MD      bisacodyl  10 mg Rectal Daily PRN Santa Cruz, MD      hydrOXYzine HCL  50 mg Oral Q6H PRN Max 4/day Santa Cruz MD      Or   Marisol Garcia diphenhydrAMINE  50 mg Intramuscular Q6H PRN Santa Cruz, MD      haloperidol  1 mg Oral Q6H PRN Santa Cruz, MD      haloperidol  2 5 mg Oral Q4H PRN Max 4/day Santa Scrapharini, MD      haloperidol  5 mg Oral Q8H PRN Alexander Francis, DO      haloperidol  5 mg Oral Q4H PRN Max 4/day Santa Cruz, MD      haloperidol lactate  5 mg Intramuscular Q6H PRN Anuj Merlos,       hydrOXYzine HCL  100 mg Oral Q6H PRN Max 4/day Santa Cruz MD      Or   Marisol Garcia LORazepam  2 mg Intramuscular Q6H PRN Santa Cruz, MD      hydrOXYzine HCL  25 mg Oral Q6H PRN Max 4/day Santa Cruz, MD      ibuprofen  400 mg Oral Q8H PRN Abby Merlos, DO      LORazepam  2 mg Intramuscular Q6H PRN Erin Merlos, DO      LORazepam  2 mg Oral Q8H PRN Erin Merlos, DO      melatonin  3 mg Oral HS Santa Cruz, MD      polyethylene glycol  17 g Oral Daily PRN Santa Cruz MD      risperiDONE  2 mg Oral HS Abi Gallus, DO      senna-docusate sodium  1 tablet Oral Daily PRN Santa Cruz MD      traZODone  50 mg Oral HS PRN Santa Cruz MD Risks / Benefits of Treatment:    Risks, benefits, and possible side effects of medications explained to patient and patient verbalizes understanding and agreement for treatment  Counseling / Coordination of Care:    Patient's progress discussed with staff in treatment team meeting  Medications, treatment progress and treatment plan reviewed with patient      Mirta Tay PA-C 01/09/22

## 2022-01-10 LAB — RPR SER QL: NORMAL

## 2022-01-10 PROCEDURE — 99232 SBSQ HOSP IP/OBS MODERATE 35: CPT | Performed by: PSYCHIATRY & NEUROLOGY

## 2022-01-10 RX ADMIN — RISPERIDONE 2 MG: 2 TABLET ORAL at 21:13

## 2022-01-10 RX ADMIN — MELATONIN TAB 3 MG 3 MG: 3 TAB at 21:13

## 2022-01-10 NOTE — PROGRESS NOTES
01/10/22 1100   Activity/Group Checklist   Group   (recovery group)   Attendance Attended   Attendance Duration (min) 46-60   Interactions Interacted appropriately   Affect/Mood Appropriate   Goals Achieved Discussed self-esteem issues; Displayed empathy;Able to listen to others; Able to engage in interactions; Able to reflect/comment on own behavior;Able to self-disclose; Able to recieve feedback; Able to give feedback to another   Patient was more active and he is speaking English more

## 2022-01-10 NOTE — PROGRESS NOTES
Progress Note - [address] [facility],4Th Floor 25 y o  male MRN: [id number]  Unit/Bed#: -02 Encounter: [id number]    Assessment/Plan   Principal Problem:    Psychotic disorder ([address] )  Active Problems:    Medical clearance for psychiatric admission    Tobacco abuse      Recommended Treatment:   Continue current course of medications with goal towards transitioning to injectables:  · Risperidone 2mg HS for psychosis  · Melatonin 3mg HS for sleep  · Trazodone 50mg HS PRN for sleep    Continue social interventions:   · Continue to engage in group and social activities  · Continue good behavioral control       Continue with group therapy, milieu therapy and occupational therapy  Continue frequent safety checks and vitals per unit protocol  Case discussed with treatment team   Risks, benefits and possible side effects of Medications: Risks, benefits, and possible side effects of medications have previously been explained  No new medications at this time  ------------------------------------------------------------    Subjective: Per nursing report, over the weekend Ivory Martinez was pleasant and cooperative with no behavioral issues and good peer interactions  Today,  Ivory Martinez continues to be pleasant and cooperative on the unit and compliant with medications  Ivory Martinez notes no medication side effects  Today, Ivory Martinez is consenting for safety on the unit  He reports his mood as "perfect" and endorses a good appetite  Sleep is "very good" and he reports approximately 9 hours of sleep with no major awakenings or disturbances  He has been active and visible in the milieu having attended two group sessions today and planning to continue to engage in activities on the unit  He expressed that he is looking forward to discharge and being able to see his family and pets; was mildly perseverative on the topic of going home   He carries a bag of coloring and colored pencils which he is worried about people stealing, stating that "I don't trust people"  Patient acknowledges previous drug use, but denies current use  Patient denies SI/HI, audio or visual hallucinations  Progress Toward Goals: slow improvement    Psychiatric Review of Systems:  Behavior over the last 24 hours: improved  Sleep: normal  Appetite: adequate  Medication side effects: none verbalized  ROS: Complete review of systems is negative except as noted above  Vital signs in last 24 hours:  Temp:  [48 6 °F (9 2 °C)-97 8 °F (36 6 °C)] 97 8 °F (36 6 °C)  HR:  [62-73] 62  Resp:  [16] 16  BP: (115-133)/(54-58) 115/54    Mental Status Exam:  Appearance:   male, alert, good eye contact, appears older than stated age, casually dressed, appropriate grooming and hygiene with one visible tattoo on right hand      Behavior:  calm, cooperative and sitting comfortably   Motor: no abnormal movements,  normal gait and balance   Speech:  spontaneous, normal rate, normal volume and coherent   Mood:  "Perfect"   Affect:  appropriate range   Thought Process:  Organized, logical, goal-directed, perseverative   Thought Content: no verbalized delusions or overt paranoia   Perceptual disturbances: no reported hallucinations and does not appear to be responding to internal stimuli at this time   Risk Potential: No active or passive suicidal or homicidal ideation was verbalized during interview   Cognition: oriented to self and situation, appears to be of average intelligence and cognition not formally tested   Insight:  Limited   Judgment: Limited     Current Medications:  Current Facility-Administered Medications   Medication Dose Route Frequency Provider Last Rate    acetaminophen  650 mg Oral Q6H PRN Chaya Prater MD      acetaminophen  650 mg Oral Q4H PRN Chaya Prater MD      acetaminophen  975 mg Oral Q6H PRN Chaya Prater MD      aluminum-magnesium hydroxide-simethicone  30 mL Oral Q4H PRN Chaya Prater MD      artificial tear  1 application Both Eyes H6A PRN Lucila Espinoza MD      haloperidol lactate  2 5 mg Intramuscular Q4H PRN Max 4/day Lucila Espinoza MD      And    LORazepam  1 mg Intramuscular Q4H PRN Max 4/day Lucila Espinoza MD      And    benztropine  0 5 mg Intramuscular Q4H PRN Max 4/day Lucila Espinoza MD      haloperidol lactate  5 mg Intramuscular Q4H PRN Max 4/day Lucila Espinoza MD      And    LORazepam  2 mg Intramuscular Q4H PRN Max 4/day Lucila Espinoza MD      And    benztropine  1 mg Intramuscular Q4H PRN Max 4/day Lucila Espinoza MD      benztropine  1 mg Oral Q4H PRN Max 6/day Lucila Espinoza MD      bisacodyl  10 mg Rectal Daily PRN Lucila Espionza MD      hydrOXYzine HCL  50 mg Oral Q6H PRN Max 4/day Lucila Espinoza MD      Or   [facility] diphenhydrAMINE  50 mg Intramuscular Q6H PRN Lucila Espinoza MD      haloperidol  1 mg Oral Q6H PRN Lucila Espinoza MD      haloperidol  2 5 mg Oral Q4H PRN Max 4/day Lucila Espinoza MD      haloperidol  5 mg Oral Q8H PRN Raine Sorenson, DO      haloperidol  5 mg Oral Q4H PRN Max 4/day Lucila Espinoza MD      haloperidol lactate  5 mg Intramuscular Q6H PRN Cynthia Merlos, DO      hydrOXYzine HCL  100 mg Oral Q6H PRN Max 4/day Lucila Espinoza MD      Or   [facility] LORazepam  2 mg Intramuscular Q6H PRN Lucila Espinoza MD      hydrOXYzine HCL  25 mg Oral Q6H PRN Max 4/day Lucila Espinoza MD      ibuprofen  400 mg Oral Q8H PRN Richad Hammers Bendock, DO      LORazepam  2 mg Intramuscular Q6H PRN Erin L Bendock, DO      LORazepam  2 mg Oral Q8H PRN Erin L Bendock, DO      melatonin  3 mg Oral HS Lucila Espinoza MD      polyethylene glycol  17 g Oral Daily PRN Lucila Espinoza MD      risperiDONE  2 mg Oral HS Fenton Scot, DO      senna-docusate sodium  1 tablet Oral Daily PRN Lucila Espinoza MD      traZODone  50 mg Oral HS PRN Lucila Espinoza MD         Behavioral Health Medications: all current active meds have been reviewed  Changes as in plan section above  Laboratory results:  I have personally reviewed all pertinent laboratory/tests results  No results found for this or any previous visit (from the past 48 hour(s))       Duglas Dalton  MS-III

## 2022-01-10 NOTE — PLAN OF CARE
Problem: SELF HARM/SUICIDALITY  Goal: Will have no self-injury during hospital stay  Description: INTERVENTIONS:  - Q 15 MINUTES: Routine safety checks  - Q WAKING SHIFT & PRN: Assess risk to determine if routine checks are adequate to maintain patient safety  - Encourage patient to participate actively in care by formulating a plan to combat response to suicidal ideation, identify supports and resources  Outcome: Progressing     Problem: DEPRESSION  Goal: Will be euthymic at discharge  Description: INTERVENTIONS:  - Administer medication as ordered  - Provide emotional support via 1:1 interaction with staff  - Encourage involvement in milieu/groups/activities  - Monitor for social isolation  Outcome: Progressing     Problem: PSYCHOSIS  Goal: Will report no hallucinations or delusions  Description: Interventions:  - Administer medication as  ordered  - Every waking shifts and PRN assess for the presence of hallucinations and or delusions  - Assist with reality testing to support increasing orientation  - Assess if patient's hallucinations or delusions are encouraging self-harm or harm to others and intervene as appropriate  Outcome: Progressing     Problem: ANXIETY  Goal: Will report anxiety at manageable levels  Description: INTERVENTIONS:  - Administer medication as ordered  - Teach and encourage coping skills  - Provide emotional support  - Assess patient/family for anxiety and ability to cope  Outcome: Progressing  Goal: By discharge: Patient will verbalize 2 strategies to deal with anxiety  Description: Interventions:  - Identify any obvious source/trigger to anxiety  - Staff will assist patient in applying identified coping technique/skills  - Encourage attendance of scheduled groups and activities  Outcome: Progressing     Problem: SUBSTANCE USE/ABUSE  Goal: Will have no detox symptoms and will verbalize plan for changing substance-related behavior  Description: INTERVENTIONS:  - Monitor physical status and assess for symptoms of withdrawal  - Administer medication as ordered  - Provide emotional support with 1 on 1 interaction with staff  - Encourage recovery focused program/ addiction education  - Assess for verbalization of changing behaviors related to substance abuse  - Initiate consults and referrals as appropriate (Case Management, Spiritual Care, etc )  Outcome: Progressing  Goal: By discharge, will develop insight into their chemical dependency and sustain motivation to continue in recovery  Description: INTERVENTIONS:  - Attends all daily group sessions and scheduled AA groups  - Actively practices coping skills through participation in the therapeutic community and adherence to program rules  - Reviews and completes assignments from individual treatment plan  - Assist patient development of understanding of their personal cycle of addiction and relapse triggers  Outcome: Progressing  Goal: By discharge, patient will have ongoing treatment plan addressing chemical dependency  Description: INTERVENTIONS:  - Assist patient with resources and/or appointments for ongoing recovery based living  Outcome: Progressing     Problem: SLEEP DISTURBANCE  Goal: Will exhibit normal sleeping pattern  Description: Interventions:  -  Assess the patients sleep pattern, noting recent changes  - Administer medication as ordered  - Decrease environmental stimuli, including noise, as appropriate during the night  - Encourage the patient to actively participate in unit groups and or exercise during the day to enhance ability to achieve adequate sleep at night  - Assess the patient, in the morning, encouraging a description of sleep experience  Outcome: Progressing     Problem: SAFETY, RESTRAINT - VIOLENT/SELF-DESTRUCTIVE  Goal: Remains free of harm/injury from restraints (Restraint for Violent/Self-Destructive Behavior)  Description: INTERVENTIONS:  - Instruct patient/family regarding restraint use   - Assess and monitor physiologic and psychological status   - Provide interventions and comfort measures to meet assessed patient needs   - Ensure continuous in person monitoring is provided   - Identify and implement measures to help patient regain control  - Assess readiness for release of restraint  Outcome: Progressing  Goal: Returns to optimal restraint-free functioning  Description: INTERVENTIONS:  - Assess the patient's behavior and symptoms that indicate continued need for restraint  - Identify and implement measures to help patient regain control  - Assess readiness for release of restraint   Outcome: Progressing

## 2022-01-10 NOTE — NURSING NOTE
Pt Denies SI/HI/AH/VH  Present in dayroom and milieu  Social with select peers  Pt appears depressed but denies depression/anxiety  Scant/guarded with communication  Pt appears brighter this evening  Medication and meal compliant  Will continue to monitor

## 2022-01-10 NOTE — TREATMENT TEAM
01/10/22 0945   Team Meeting   Meeting Type Daily Rounds   Team Members Present   Team Members Present Physician;Nurse;   Physician Team Member [address]   Nursing Team Member Jeo   Care Management Team Member Alejandra   Patient/Family Present   Patient Present No   Patient's Family Present No     Slow progress - pt calm and cooperative  Compliant with meds and meals  Prn Trazodone given for sleep  Continue med management -increased Risperidone to 2 mg over the weekend  Continue to monitor  Discharge to be determined

## 2022-01-10 NOTE — NURSING NOTE
Pt denies SI/HI/AH/VH  Isolative to room this morning  Pt is calm and pleasant  Meal compliant  Scant with communication  Brightens on approach  Later this morning Pt attended groups  Social with peers  Pt wearing hospital attire  When asked if he needed clothing pt stated -no I am ok  Pt remained calm and pleasant this afternoon  Will continue to monitor

## 2022-01-10 NOTE — CASE MANAGEMENT
Case Management Progress Note    Patient name Laurent Bagley  Location [place] [address]/-52 MRN [id number]  : 2000 Date 1/10/2022       LOS (days): 4  Geometric Mean LOS (GMLOS) (days):   Days to GMLOS:        OBJECTIVE:        Current admission status: Inpatient Psych  Preferred Pharmacy:   [address] [place] [address], VICTORIA Arechiga  [address]  Phone: [phone] Fax: [phone]    PATIENT/FAMILY REPORTS NO PREFERRED PHARMACY  No address on file      Primary Care Provider: No primary care provider on file  Primary Insurance: [address] [place] [address]  Secondary Insurance:     PROGRESS NOTE:    Completed ICM referral and clinical packet faxed to Darin and VICTOR[facility]

## 2022-01-11 PROCEDURE — 99232 SBSQ HOSP IP/OBS MODERATE 35: CPT | Performed by: PSYCHIATRY & NEUROLOGY

## 2022-01-11 RX ADMIN — RISPERIDONE 3 MG: 2 TABLET ORAL at 21:41

## 2022-01-11 RX ADMIN — MELATONIN TAB 3 MG 3 MG: 3 TAB at 21:41

## 2022-01-11 NOTE — DISCHARGE INSTR - OTHER ORDERS
You will be discharged with father back to prior living -home [address]  [place] at 12 pm      Your d/c meds will be sent / eprescribed to [address]  on [facility]  You will have one refill on these meds  Your MARIN Jake Marco Antonio will be faxed to [facility], they will administer next injection  Then, ORAL -OP provider will administer MARIN make sure to attend your scheduled appointments  After discharge, if you find your coping skills are not as effective and you continue to feel distressed please call [facility]  services are available 24 hours a day by calling [facility] [place] at [phone], and [address] : [phone]    Crisis Text Line : [phone]     If you feel you are a danger to yourself or others please contact 911 or go to nearest Emergency room to seek immediate help

## 2022-01-11 NOTE — TREATMENT TEAM
01/11/22 0855   Team Meeting   Meeting Type Daily Rounds   Team Members Present   Team Members Present Physician;Nurse;   Physician Team Member Favio Sun   Nursing Team Member KHLOE MOFFETT [facility] Management Team Member Alejandra   Patient/Family Present   Patient Present No   Patient's Family Present No     Pt less disorganized "I don't trust people"  Pt visible and attends groups  Compliant with meds and meals  Continue med management  Continue to monitor  Discharge to be determined

## 2022-01-11 NOTE — NURSING NOTE
Pt denies SI, HI, AH and VH  Pt is calm, cooperative and pleasant  Pt reported to group therapy leader "I don't think my medications are working and I don't know who to talk to about it " Information was passed along to pts psychiatrist  Pt has no current complaints or concerns  Meal compliant  Will monitor

## 2022-01-11 NOTE — DISCHARGE INSTR - APPOINTMENTS
Sheldon Henderson RN, our [employer], will be calling you after your discharge, on the phone number that you provided  She will be available as an additional support, if needed  If you wish to speak with her, you may contact Jesenia Ortiz at [phone]

## 2022-01-11 NOTE — CASE MANAGEMENT
Pt was socializing and coloring with peers prior to meeting with this writer  Pt states current meds seem to be working and he is looking forward for discharge soon  Pt noted that he slept more than 7 hours yesterday  Discussed about potential MARIN, pt declined stating "I do not like needles and I am planning to take Po Meds"  Pt denied having SI, Hi and AVH  After discharge pt plans to look for job in order to help his family pay bills and also plan to be more involved in [facility]  Continue to monitor

## 2022-01-11 NOTE — NURSING NOTE
Pt denies SI, HI, Ah and VH  Pt is calm, cooperative and pleasant  Pt is visible in the unit and social with select peers  Pt has no complaints or concerns  Pt brightens upon approach  Meal compliant  Will monitor

## 2022-01-11 NOTE — PLAN OF CARE
BARRY Group Note  Pt attended to group and offered insight into topics, sharing personal experience when appropriate  Pt approached BARRY/L after group and discussed discharge plans, his desire to work and be independent, and general feelings of being on the unit  BARRY/L encouraged pt to look into skilled trades for work, as he expressed a dislike for all work he has done in the past  Pt also was an  for peer during session      Problem: Ineffective Coping  Goal: Participates in unit activities  Description: Interventions:  - Provide therapeutic environment   - Provide required programming   - Redirect inappropriate behaviors   Outcome: Progressing

## 2022-01-11 NOTE — PROGRESS NOTES
01/11/22 1100   Activity/Group Checklist   Group   (recovery group)   Attendance Attended   Attendance Duration (min) 46-60   Interactions Interacted appropriately   Affect/Mood Appropriate   Goals Achieved Discussed coping strategies; Discussed self-esteem issues; Able to listen to others; Able to engage in interactions; Able to reflect/comment on own behavior;Able to self-disclose; Able to recieve feedback   topic: Communication styles- He can relate being different parts of the continuum but currently is more passive than assertive

## 2022-01-11 NOTE — CASE MANAGEMENT
Mireya Mccollum from [facility] Case management replied to referral stating they are temporarily not accepting new patient  Follow up with other [facility] agencies

## 2022-01-11 NOTE — PROGRESS NOTES
Progress Note - [address] [facility],4Th Floor 25 y o  male MRN: [id number]  Unit/Bed#: [facility] 381-02 Encounter: [id number]    Assessment/Plan   Principal Problem:    Psychotic disorder [facility])  Active Problems:    Medical clearance for psychiatric admission    Tobacco abuse      Subjective: Patient was seen, chart reviewed and case discussed with team    Pt seen in TV room  Pt had told RN that his medication is not helping him  Approached pt about what was it the medication was not helping with  Pt is unable to give an answer  He denies all symptoms  He feels he is ready to go  Pt is superficial and scant in conversation  Pt seems internally preoccupied  He has been meal and medication compliant  No SI/HI/AH/VH reported  Pt carries a pouch with color pencils and crayons             Psychiatric Review of Systems:  Behavior over the last 24 hours:  unchanged  Sleep: improving  Appetite: adequate  Medication side effects: none verbalized  ROS: no complaints, all others negative    Current Medications:  Current Facility-Administered Medications   Medication Dose Route Frequency    acetaminophen (TYLENOL) tablet 650 mg  650 mg Oral Q6H PRN    acetaminophen (TYLENOL) tablet 650 mg  650 mg Oral Q4H PRN    acetaminophen (TYLENOL) tablet 975 mg  975 mg Oral Q6H PRN    aluminum-magnesium hydroxide-simethicone (MYLANTA) oral suspension 30 mL  30 mL Oral Q4H PRN    artificial tear (LUBRIFRESH P M ) ophthalmic ointment 1 application  1 application Both Eyes K2Q PRN    haloperidol lactate (HALDOL) injection 2 5 mg  2 5 mg Intramuscular Q4H PRN Max 4/day    And    LORazepam (ATIVAN) injection 1 mg  1 mg Intramuscular Q4H PRN Max 4/day    And    benztropine (COGENTIN) injection 0 5 mg  0 5 mg Intramuscular Q4H PRN Max 4/day    haloperidol lactate (HALDOL) injection 5 mg  5 mg Intramuscular Q4H PRN Max 4/day    And    LORazepam (ATIVAN) injection 2 mg  2 mg Intramuscular Q4H PRN Max 4/day    And    benztropine (COGENTIN) injection 1 mg  1 mg Intramuscular Q4H PRN Max 4/day    benztropine (COGENTIN) tablet 1 mg  1 mg Oral Q4H PRN Max 6/day    bisacodyl (DULCOLAX) rectal suppository 10 mg  10 mg Rectal Daily PRN    hydrOXYzine HCL (ATARAX) tablet 50 mg  50 mg Oral Q6H PRN Max 4/day    Or    diphenhydrAMINE (BENADRYL) injection 50 mg  50 mg Intramuscular Q6H PRN    haloperidol (HALDOL) tablet 1 mg  1 mg Oral Q6H PRN    haloperidol (HALDOL) tablet 2 5 mg  2 5 mg Oral Q4H PRN Max 4/day    haloperidol (HALDOL) tablet 5 mg  5 mg Oral Q8H PRN    haloperidol (HALDOL) tablet 5 mg  5 mg Oral Q4H PRN Max 4/day    haloperidol lactate (HALDOL) injection 5 mg  5 mg Intramuscular Q6H PRN    hydrOXYzine HCL (ATARAX) tablet 100 mg  100 mg Oral Q6H PRN Max 4/day    Or    LORazepam (ATIVAN) injection 2 mg  2 mg Intramuscular Q6H PRN    hydrOXYzine HCL (ATARAX) tablet 25 mg  25 mg Oral Q6H PRN Max 4/day    ibuprofen (MOTRIN) tablet 400 mg  400 mg Oral Q8H PRN    LORazepam (ATIVAN) injection 2 mg  2 mg Intramuscular Q6H PRN    LORazepam (ATIVAN) tablet 2 mg  2 mg Oral Q8H PRN    melatonin tablet 3 mg  3 mg Oral HS    polyethylene glycol (MIRALAX) packet 17 g  17 g Oral Daily PRN    risperiDONE (RisperDAL) tablet 2 mg  2 mg Oral HS    senna-docusate sodium (SENOKOT S) 8 6-50 mg per tablet 1 tablet  1 tablet Oral Daily PRN    traZODone (DESYREL) tablet 50 mg  50 mg Oral HS PRN       Behavioral Health Medications: all current active meds have been reviewed  Vitals:  Vitals:    01/11/22 0747   BP: 143/77   Pulse: 73   Resp: 16   Temp: 97 5 °F (36 4 °C)   SpO2: 100%       Laboratory results:    I have personally reviewed all pertinent laboratory/tests results    Most Recent Labs:   Lab Results   Component Value Date    WBC 7 80 01/07/2022    RBC 5 38 01/07/2022    HGB 15 7 01/07/2022    HCT 47 7 01/07/2022     01/07/2022    RDW 13 3 01/07/2022    NEUTROABS 4 10 01/07/2022    SODIUM 139 01/07/2022    K 4 1 01/07/2022  01/07/2022    CO2 25 01/07/2022    BUN 10 01/07/2022    CREATININE 0 66 (L) 01/07/2022    GLUC 131 (H) 01/07/2022    CALCIUM 9 1 01/07/2022    AST 29 01/07/2022    ALT 22 01/07/2022    ALKPHOS 57 01/07/2022    TP 8 0 01/07/2022    ALB 4 5 01/07/2022    TBILI 0 63 01/07/2022    CHOLESTEROL 123 01/07/2022    HDL 21 (L) 01/07/2022    TRIG 92 01/07/2022    LDLCALC 84 01/07/2022    NONHDLC 102 01/07/2022    ILN6KALIACGM 1 060 01/07/2022    RPR Non-Reactive 01/07/2022       Mental Status Evaluation:  Appearance:  adequate grooming and hygiene;    Behavior:  calm, cooperative but superficial   Speech:  scant   Mood:  "Good"   Affect:  mood-congruent   Language Appropriate   Thought Process:  slowed   Thought Content:  pt does seem to be internally preoccupied   Perceptual Disturbances: denies; but seems preoccupied   Risk Potential: Suicidal Ideations none, Homicidal Ideations none and Potential for Aggression No   Sensorium:  person and place   Cognition:  not tested   Consciousness:  alert and awake    Attention: attention span appeared shorter than expected for age   Insight:  limited   Judgment: limited   Gait/Station: normal gait/station and normal balance   Motor Activity: no abnormal movements     Progress Toward Goals: slow improvement    Recommended Treatment: Continue with pharmacotherapy, group therapy, milieu therapy and occupational therapy  1  Will increase risperidone to 3 mg q HS; will approach pt with agreeability to MARIN  2  DC planning  Risks, benefits and possible side effects of Medications:   Risks, benefits, and possible side effects of medications explained to patient and patient verbalizes understanding

## 2022-01-12 PROCEDURE — 99232 SBSQ HOSP IP/OBS MODERATE 35: CPT | Performed by: PSYCHIATRY & NEUROLOGY

## 2022-01-12 RX ORDER — RISPERIDONE 2 MG/1
4 TABLET, FILM COATED ORAL
Status: DISCONTINUED | OUTPATIENT
Start: 2022-01-12 | End: 2022-01-13

## 2022-01-12 RX ADMIN — RISPERIDONE 4 MG: 2 TABLET ORAL at 21:26

## 2022-01-12 RX ADMIN — MELATONIN TAB 3 MG 3 MG: 3 TAB at 21:26

## 2022-01-12 NOTE — NURSING NOTE
Pt denies SI, HI, AH and VH  Pt is calm and cooperative  Pt is intermittently visible in the milieu and social with select peers  Pt has no complaints or concerns  Meal compliant  Will monitor

## 2022-01-12 NOTE — NURSING NOTE
Pt denies SI, HI, AH and VH  Pt does reporting feeling depressed stating "my mood is no good today, I think I need to talk to my doctor about it " Pt is mainly isolative to room with brief intervals in the milieu  Pt has no complaints or concerns  Meal compliant  Will monitor

## 2022-01-12 NOTE — PROGRESS NOTES
Progress Note - [address] [facility],4Th Floor 25 y o  male MRN: [id number]  Unit/Bed#: [facility] 381-02 Encounter: [id number]    Assessment/Plan   Principal Problem:    Psychotic disorder [facility])  Active Problems:    Medical clearance for psychiatric admission    Tobacco abuse      Subjective: Patient was seen, chart reviewed and case discussed with team    Pt seen in room lying in bed  He is calm and cooperative over all but is superficial with writer  He says he is not doing good because he wants to be home  Pt has no other complaints  He reports he has been eating and sleeping okay  Pt still seems to be internally preoccupied  Pt does go out in the milieu and is social  Pt has been meal and medication compliant  Pt denies any thoughts of harming self or others  He also denies any /         Psychiatric Review of Systems:  Behavior over the last 24 hours:  unchanged  Sleep: normal  Appetite: normal  Medication side effects: No  ROS: no complaints, all others negative    Current Medications:  Current Facility-Administered Medications   Medication Dose Route Frequency    acetaminophen (TYLENOL) tablet 650 mg  650 mg Oral Q6H PRN    acetaminophen (TYLENOL) tablet 650 mg  650 mg Oral Q4H PRN    acetaminophen (TYLENOL) tablet 975 mg  975 mg Oral Q6H PRN    aluminum-magnesium hydroxide-simethicone (MYLANTA) oral suspension 30 mL  30 mL Oral Q4H PRN    artificial tear (LUBRIFRESH P M ) ophthalmic ointment 1 application  1 application Both Eyes E9S PRN    haloperidol lactate (HALDOL) injection 2 5 mg  2 5 mg Intramuscular Q4H PRN Max 4/day    And    LORazepam (ATIVAN) injection 1 mg  1 mg Intramuscular Q4H PRN Max 4/day    And    benztropine (COGENTIN) injection 0 5 mg  0 5 mg Intramuscular Q4H PRN Max 4/day    haloperidol lactate (HALDOL) injection 5 mg  5 mg Intramuscular Q4H PRN Max 4/day    And    LORazepam (ATIVAN) injection 2 mg  2 mg Intramuscular Q4H PRN Max 4/day    And    benztropine (COGENTIN) injection 1 mg  1 mg Intramuscular Q4H PRN Max 4/day    benztropine (COGENTIN) tablet 1 mg  1 mg Oral Q4H PRN Max 6/day    bisacodyl (DULCOLAX) rectal suppository 10 mg  10 mg Rectal Daily PRN    hydrOXYzine HCL (ATARAX) tablet 50 mg  50 mg Oral Q6H PRN Max 4/day    Or    diphenhydrAMINE (BENADRYL) injection 50 mg  50 mg Intramuscular Q6H PRN    haloperidol (HALDOL) tablet 1 mg  1 mg Oral Q6H PRN    haloperidol (HALDOL) tablet 2 5 mg  2 5 mg Oral Q4H PRN Max 4/day    haloperidol (HALDOL) tablet 5 mg  5 mg Oral Q8H PRN    haloperidol (HALDOL) tablet 5 mg  5 mg Oral Q4H PRN Max 4/day    haloperidol lactate (HALDOL) injection 5 mg  5 mg Intramuscular Q6H PRN    hydrOXYzine HCL (ATARAX) tablet 100 mg  100 mg Oral Q6H PRN Max 4/day    Or    LORazepam (ATIVAN) injection 2 mg  2 mg Intramuscular Q6H PRN    hydrOXYzine HCL (ATARAX) tablet 25 mg  25 mg Oral Q6H PRN Max 4/day    ibuprofen (MOTRIN) tablet 400 mg  400 mg Oral Q8H PRN    LORazepam (ATIVAN) injection 2 mg  2 mg Intramuscular Q6H PRN    LORazepam (ATIVAN) tablet 2 mg  2 mg Oral Q8H PRN    melatonin tablet 3 mg  3 mg Oral HS    polyethylene glycol (MIRALAX) packet 17 g  17 g Oral Daily PRN    risperiDONE (RisperDAL) tablet 3 mg  3 mg Oral HS    senna-docusate sodium (SENOKOT S) 8 6-50 mg per tablet 1 tablet  1 tablet Oral Daily PRN    traZODone (DESYREL) tablet 50 mg  50 mg Oral HS PRN       Behavioral Health Medications: all current active meds have been reviewed  Vitals:  Vitals:    01/12/22 0734   BP: 105/62   Pulse: 66   Resp: 16   Temp: (!) 97 3 °F (36 3 °C)   SpO2: 100%       Laboratory results:  I have personally reviewed all pertinent laboratory/tests results      Mental Status Evaluation:  Appearance:  hospital attire and adequate grooming and hygiene   Behavior:  calm and cooperative but seems superficial   Speech:  soft and scant   Mood:  constricted   Affect:  constricted   Language Appropriate   Thought Process:  slowed Thought Content:  no overt delusions   Perceptual Disturbances: pt denies although still seems to be internally preoccupied  Risk Potential: Suicidal Ideations none and Homicidal Ideations none   Sensorium:  person, place and time/date   Consciousness:  alert and awake    Attention: attention span appeared shorter than expected for age   Insight:  limited   Judgment: limited   Gait/Station: Pt lying in bed   Motor Activity: no abnormal movements     Progress Toward Goals: slow progress    Recommended Treatment: Continue with pharmacotherapy, group therapy, milieu therapy and occupational therapy  1  Increase risperidone to 4 mg po q HS  2  DC Planning  Risks, benefits and possible side effects of Medications:   Risks, benefits, and possible side effects of medications explained to patient and patient verbalizes understanding

## 2022-01-12 NOTE — PLAN OF CARE
[facility] Group Note    Problem: Ineffective Coping  Goal: Participates in unit activities  Description: Interventions:  - Provide therapeutic environment   - Provide required programming   - Redirect inappropriate behaviors   Outcome: Not Progressing

## 2022-01-12 NOTE — PROGRESS NOTES
01/12/22 0942   Team Meeting   Meeting Type Daily Rounds   Team Members Present   Team Members Present Physician;Nurse;   Physician Team Member Dr Cat Yang Team Member KHLOE MOFFETT [facility] Management Team Member Gabriela   Patient/Family Present   Patient Present No   Patient's Family Present No   Pt medication compliant, increase Risperdal   Pt refusing MARIN of Kyle Stuart at this time  Pt denies symptoms  Pt visible and social   Discharge pending for next week

## 2022-01-13 PROCEDURE — 99232 SBSQ HOSP IP/OBS MODERATE 35: CPT | Performed by: PSYCHIATRY & NEUROLOGY

## 2022-01-13 RX ORDER — TRAZODONE HYDROCHLORIDE 50 MG/1
50 TABLET ORAL
Status: DISCONTINUED | OUTPATIENT
Start: 2022-01-13 | End: 2022-01-18 | Stop reason: HOSPADM

## 2022-01-13 RX ORDER — LANOLIN ALCOHOL/MO/W.PET/CERES
3 CREAM (GRAM) TOPICAL
Status: DISCONTINUED | OUTPATIENT
Start: 2022-01-13 | End: 2022-01-18 | Stop reason: HOSPADM

## 2022-01-13 RX ADMIN — TRAZODONE HYDROCHLORIDE 50 MG: 50 TABLET ORAL at 21:22

## 2022-01-13 RX ADMIN — PALIPERIDONE PALMITATE 234 MG: 234 INJECTION INTRAMUSCULAR at 11:52

## 2022-01-13 NOTE — NURSING NOTE
Patient transfered to 3B unit bed 350-2, cooperative/pleasant with staff  Denies SI/HI/AH/VH,  Denies any concerns, complaints with meds and meals; observed mostly in the Dayroom area watching TV  Will continue to monitor at this time

## 2022-01-13 NOTE — CASE MANAGEMENT
Prior Auth for [place] 156MG/ML syringes has been submitted via [address]  Cover My meds (Key: [id number])    CM to follow up

## 2022-01-13 NOTE — NURSING NOTE
Pt denies SI, HI, AH and VH  Pt does report feeling depressed but states "It is slightly better than yesterday " Pt has no complaints or concerns  Pt is intermittently visible in the milieu and social with select peers  Pt is calm, cooperative and pleasant  Meal compliant  Will monitor

## 2022-01-13 NOTE — CASE MANAGEMENT
ORAL was called and completed pt's [facility] referral      P's Intake appointment scheduled on Thursday, 1/20/22 at 11 AM with theapist with [employer]  This is added to patient's AVS      The provider requested AVS be faxed upon discharge

## 2022-01-13 NOTE — PLAN OF CARE
Problem: SELF HARM/SUICIDALITY  Goal: Will have no self-injury during hospital stay  Description: INTERVENTIONS:  - Q 15 MINUTES: Routine safety checks  - Q WAKING SHIFT & PRN: Assess risk to determine if routine checks are adequate to maintain patient safety  - Encourage patient to participate actively in care by formulating a plan to combat response to suicidal ideation, identify supports and resources  Outcome: Progressing     Problem: PSYCHOSIS  Goal: Will report no hallucinations or delusions  Description: Interventions:  - Administer medication as  ordered  - Every waking shifts and PRN assess for the presence of hallucinations and or delusions  - Assist with reality testing to support increasing orientation  - Assess if patient's hallucinations or delusions are encouraging self-harm or harm to others and intervene as appropriate  Outcome: Progressing     Problem: ANXIETY  Goal: Will report anxiety at manageable levels  Description: INTERVENTIONS:  - Administer medication as ordered  - Teach and encourage coping skills  - Provide emotional support  - Assess patient/family for anxiety and ability to cope  Outcome: Progressing  Goal: By discharge: Patient will verbalize 2 strategies to deal with anxiety  Description: Interventions:  - Identify any obvious source/trigger to anxiety  - Staff will assist patient in applying identified coping technique/skills  - Encourage attendance of scheduled groups and activities  Outcome: Progressing     Problem: SUBSTANCE USE/ABUSE  Goal: Will have no detox symptoms and will verbalize plan for changing substance-related behavior  Description: INTERVENTIONS:  - Monitor physical status and assess for symptoms of withdrawal  - Administer medication as ordered  - Provide emotional support with 1 on 1 interaction with staff  - Encourage recovery focused program/ addiction education  - Assess for verbalization of changing behaviors related to substance abuse  - Initiate consults and referrals as appropriate (Case Management, Spiritual Care, etc )  Outcome: Progressing  Goal: By discharge, will develop insight into their chemical dependency and sustain motivation to continue in recovery  Description: INTERVENTIONS:  - Attends all daily group sessions and scheduled AA groups  - Actively practices coping skills through participation in the therapeutic community and adherence to program rules  - Reviews and completes assignments from individual treatment plan  - Assist patient development of understanding of their personal cycle of addiction and relapse triggers  Outcome: Progressing  Goal: By discharge, patient will have ongoing treatment plan addressing chemical dependency  Description: INTERVENTIONS:  - Assist patient with resources and/or appointments for ongoing recovery based living  Outcome: Progressing     Problem: SLEEP DISTURBANCE  Goal: Will exhibit normal sleeping pattern  Description: Interventions:  -  Assess the patients sleep pattern, noting recent changes  - Administer medication as ordered  - Decrease environmental stimuli, including noise, as appropriate during the night  - Encourage the patient to actively participate in unit groups and or exercise during the day to enhance ability to achieve adequate sleep at night  - Assess the patient, in the morning, encouraging a description of sleep experience  Outcome: Progressing     Problem: SAFETY, RESTRAINT - VIOLENT/SELF-DESTRUCTIVE  Goal: Remains free of harm/injury from restraints (Restraint for Violent/Self-Destructive Behavior)  Description: INTERVENTIONS:  - Instruct patient/family regarding restraint use   - Assess and monitor physiologic and psychological status   - Provide interventions and comfort measures to meet assessed patient needs   - Ensure continuous in person monitoring is provided   - Identify and implement measures to help patient regain control  - Assess readiness for release of restraint  Outcome: Progressing  Goal: Returns to optimal restraint-free functioning  Description: INTERVENTIONS:  - Assess the patient's behavior and symptoms that indicate continued need for restraint  - Identify and implement measures to help patient regain control  - Assess readiness for release of restraint   Outcome: Progressing     Problem: DEPRESSION  Goal: Will be euthymic at discharge  Description: INTERVENTIONS:  - Administer medication as ordered  - Provide emotional support via 1:1 interaction with staff  - Encourage involvement in milieu/groups/activities  - Monitor for social isolation  Outcome: Not Progressing

## 2022-01-13 NOTE — PROGRESS NOTES
Progress Note - [address] [facility],4Th Floor 25 y o  male MRN: [id number]  Unit/Bed#: [facility] 381-02 Encounter: [id number]    Assessment/Plan   Principal Problem:    Psychotic disorder [facility])  Active Problems:    Medical clearance for psychiatric admission    Tobacco abuse  Interview was conducted in Swedish as patient's primary language    Subjective:Patient was seen today for continuation of care, records reviewed and  patient was discussed with the morning case review team   Justina Alexandra was seen for a psych follow-up  Initially he presented   Limited speech initially and mentioned his English understanding was poor  He then was able to express and has appropriate questions and Swedish  He feels the medication were working initially but now he did not feel as much improvement  He shared he used to work in [employer]  He added he was on medications last year and stopped them as he was feeling well  We discussed talking to a provider prior stopping meds next time  Currently denied depressive symptoms and that yesterday he was feeling very depressed  He reported having difficulty falling and staying asleep  He looks forward go back to [facility] as he founds it helpful and was what maintain him stable  He is currently denying any auditory or visual hallucinations  I explained to patient the benefit of adherence and compliance with Invega and he was agreeable  Patient stated that he missed understood and though it was a shot daily that he needed to get done so he was refusing it before  Patient was able to answer all appropriate questions and asked important questions about medications, follow-ups, and prognosis  He is endorsing good appetite, he has been walking frequently and socializing with some [place] peers  He denies endorsing any suicidal or homicidal ideation  Denies any side effects from medications      Psychiatric Review of Systems:    Sleep: difficulty falling asleep  Appetite: normal  Medication side effects: No   ROS: no complaints, denies any headache, shortness of breath, chest pain, lightheadedness, muscle aches or tremor, all other systems are negative    Vitals:  Vitals:    01/13/22 0723   BP: 123/68   Pulse: 73   Resp: 16   Temp: 97 5 °F (36 4 °C)   SpO2: 100%       Mental Status Evaluation:    Appearance:  dressed in hospital attire   Behavior:  pleasant, cooperative, calm, better eye contact   Speech:  normal rate and volume, fluent, slow   Mood:  less depressed   Affect:  blunted   Thought Process:  organized, goal directed   Associations: intact associations   Thought Content:  no overt delusions   Perceptual Disturbances: denies auditory or visual hallucinations when asked   Risk Potential: Suicidal ideation - None  Homicidal ideation - None  Potential for aggression - No   Sensorium:  oriented to person, place and time/date   Memory:  recent and remote memory grossly intact   Consciousness:  alert and awake   Attention: attention span and concentration are improving   Insight:  improving   Judgment: improving   Gait/Station: normal gait/station, normal balance   Motor Activity: no abnormal movements     Laboratory results:    I have personally reviewed all pertinent laboratory/tests results    Most Recent Labs:   Lab Results   Component Value Date    WBC 7 80 01/07/2022    RBC 5 38 01/07/2022    HGB 15 7 01/07/2022    HCT 47 7 01/07/2022     01/07/2022    RDW 13 3 01/07/2022    NEUTROABS 4 10 01/07/2022    SODIUM 139 01/07/2022    K 4 1 01/07/2022     01/07/2022    CO2 25 01/07/2022    BUN 10 01/07/2022    CREATININE 0 66 (L) 01/07/2022    GLUC 131 (H) 01/07/2022    CALCIUM 9 1 01/07/2022    AST 29 01/07/2022    ALT 22 01/07/2022    ALKPHOS 57 01/07/2022    TP 8 0 01/07/2022    ALB 4 5 01/07/2022    TBILI 0 63 01/07/2022    CHOLESTEROL 123 01/07/2022    HDL 21 (L) 01/07/2022    TRIG 92 01/07/2022    LDLCALC 84 01/07/2022    NONHDLC 102 01/07/2022    PRH8HQGSWXEV 1 060 01/07/2022    RPR Non-Reactive 01/07/2022     EKG   Lab Results   Component Value Date    VENTRATE 69 01/07/2022    ATRIALRATE 69 01/07/2022    PRINT 118 01/07/2022    QRSDINT 90 01/07/2022    QTINT 378 01/07/2022    QTCINT 405 01/07/2022    PAXIS -4 01/07/2022    QRSAXIS 80 01/07/2022    TWAVEAXIS 59 01/07/2022       Progress Toward Goals:     Improving  Patient is agreeable for long-acting injectable    Asked appropriate questions in regards of his diagnosis, prognosis and treatment    Recommended Treatment:     All current active medications have been reviewed  Encourage group therapy, milieu therapy and occupational therapy  Behavioral Health checks every 7 minutes  Discharge planning  Family meeting with parents pending reccommended  Start Invega Sustenna 234 mg today to stabilize mood and psychotic symptoms  Add 2d dose for Invega Sustenna 156 mg next week and monthly afterwards  Check lipid profile, hemoglobin A1C and EKG monthly    Continue all other medications:  Current Facility-Administered Medications   Medication Dose Route Frequency Provider Last Rate    acetaminophen  650 mg Oral Q6H PRN Isha Mathews MD      acetaminophen  650 mg Oral Q4H PRN Isha Mathews MD      acetaminophen  975 mg Oral Q6H PRN Isha Mathews MD      aluminum-magnesium hydroxide-simethicone  30 mL Oral Q4H PRN Isha Mathews MD      artificial tear  1 application Both Eyes Z1I PRN Isha Mathews MD      haloperidol lactate  2 5 mg Intramuscular Q4H PRN Max 4/day Isha Mathews MD      And    LORazepam  1 mg Intramuscular Q4H PRN Max 4/day Isha Mathews MD      And    benztropine  0 5 mg Intramuscular Q4H PRN Max 4/day Isha Mathews MD      haloperidol lactate  5 mg Intramuscular Q4H PRN Max 4/day Isha Mathews MD      And    LORazepam  2 mg Intramuscular Q4H PRN Max 4/day Isha Mathews MD      And    benztropine  1 mg Intramuscular Q4H PRN Max 4/day Isha Mathews MD     Tony Pert benztropine  1 mg Oral Q4H PRN Max 6/day Karen Wolff MD      bisacodyl  10 mg Rectal Daily PRN Karen Wolff MD      hydrOXYzine HCL  50 mg Oral Q6H PRN Max 4/day Karen Wolff MD      Or   [facility] diphenhydrAMINE  50 mg Intramuscular Q6H PRN Karen Wolff MD      haloperidol  1 mg Oral Q6H PRN Karen Wolff MD      haloperidol  2 5 mg Oral Q4H PRN Max 4/day Karen Wolff MD      haloperidol  5 mg Oral Q8H PRN Suzon Vanesa, DO      haloperidol  5 mg Oral Q4H PRN Max 4/day Karen Wolff MD      haloperidol lactate  5 mg Intramuscular Q6H PRN Florene Inch Bendock, DO      hydrOXYzine HCL  100 mg Oral Q6H PRN Max 4/day Karen Wolff MD      Or   [facility] LORazepam  2 mg Intramuscular Q6H PRN Karen Wolff MD      hydrOXYzine HCL  25 mg Oral Q6H PRN Max 4/day Karen Wolff MD      ibuprofen  400 mg Oral Q8H PRN Florene Inch Bendock, DO      LORazepam  2 mg Intramuscular Q6H PRN Erin L Bendock, DO      LORazepam  2 mg Oral Q8H PRN Erin L Bendock, DO      melatonin  3 mg Oral HS Karen Wolff MD      polyethylene glycol  17 g Oral Daily PRN Karen Wolff MD      risperiDONE  4 mg Oral HS Silvia Jackson, DO      senna-docusate sodium  1 tablet Oral Daily PRN Karen Wolff MD      traZODone  50 mg Oral HS PRN Karen Wolff MD         Risks / Benefits of Treatment:     Risks, benefits, and possible side effects of medications explained to patient and patient verbalizes understanding and agreement for treatment  Counseling / Coordination of Care:     Patient's progress reviewed with nursing staff  Medications, treatment progress and treatment plan reviewed with patient  Supportive counseling provided to the patient            Dada Kwong

## 2022-01-14 PROCEDURE — 99232 SBSQ HOSP IP/OBS MODERATE 35: CPT | Performed by: NURSE PRACTITIONER

## 2022-01-14 RX ADMIN — TRAZODONE HYDROCHLORIDE 50 MG: 50 TABLET ORAL at 21:14

## 2022-01-14 NOTE — NURSING NOTE
Pt visible on unit  Pleasant on approach  Denies all symptoms presently  No behavioral issues  Denies any issues/concerns at this time

## 2022-01-14 NOTE — NURSING NOTE
Patient was calm and cooperative during interactions with this staff  During 1:1 patient denied SI, HI, hallucinations, and agreed to notify staff immediately if they began to experience any of these symptoms

## 2022-01-14 NOTE — NURSING NOTE
Patient visible in the milieu in brief intervals  Interacting with select peers  Denies all symptoms at this time  A bit guarded on approach  Will monitor

## 2022-01-14 NOTE — CASE MANAGEMENT
Follow up on PriorAuth for Samul Fertile 156mg;    Reply received on 1/13/22, from [employer] company stating that the requested medication does not require prior Auth  Therefore, patient's Samul Fertile is formulary or covered meds  A copy of the same has been placed in scan bit

## 2022-01-14 NOTE — CASE MANAGEMENT
Pt's father Binh President was called at [phone]: provided status update and current meds including MARIN Dandre Friend  Language line with Id [id number] was used  Father referring to recent phone calls states "When I spoke to him yesterday Martha vAery still appears confused and bit disorganized  I do think he would need more treatment to get stabilized  CM informe father that patient is still in treatment and based on pt's progress, father will be provided of a tentative discharge date on Monday  Father expressed concerns of patient being discharge without being stable  CM clarified questions about discharge plan  CM to continue coordinate

## 2022-01-14 NOTE — CASE MANAGEMENT
Pt was socializing with peer prior to meeting with this writer  Pt states "I got he injection yesterday  I have been getting better and want to go home soon"  Pt was advised about his booster shot and potential discharge plan for next week  Briefly informed pt about the discharge process, pt verbalized understanding  Pt was provided positive feedback for his compliance to treatment  Pt denied having SI, HI and AVH  Continue to monitor

## 2022-01-14 NOTE — PLAN OF CARE
Problem: SELF HARM/SUICIDALITY  Goal: Will have no self-injury during hospital stay  Description: INTERVENTIONS:  - Q 15 MINUTES: Routine safety checks  - Q WAKING SHIFT & PRN: Assess risk to determine if routine checks are adequate to maintain patient safety  - Encourage patient to participate actively in care by formulating a plan to combat response to suicidal ideation, identify supports and resources  Outcome: Progressing     Problem: DEPRESSION  Goal: Will be euthymic at discharge  Description: INTERVENTIONS:  - Administer medication as ordered  - Provide emotional support via 1:1 interaction with staff  - Encourage involvement in milieu/groups/activities  - Monitor for social isolation  Outcome: Progressing     Problem: PSYCHOSIS  Goal: Will report no hallucinations or delusions  Description: Interventions:  - Administer medication as  ordered  - Every waking shifts and PRN assess for the presence of hallucinations and or delusions  - Assist with reality testing to support increasing orientation  - Assess if patient's hallucinations or delusions are encouraging self-harm or harm to others and intervene as appropriate  Outcome: Progressing     Problem: ANXIETY  Goal: Will report anxiety at manageable levels  Description: INTERVENTIONS:  - Administer medication as ordered  - Teach and encourage coping skills  - Provide emotional support  - Assess patient/family for anxiety and ability to cope  Outcome: Progressing  Goal: By discharge: Patient will verbalize 2 strategies to deal with anxiety  Description: Interventions:  - Identify any obvious source/trigger to anxiety  - Staff will assist patient in applying identified coping technique/skills  - Encourage attendance of scheduled groups and activities  Outcome: Progressing     Problem: SUBSTANCE USE/ABUSE  Goal: Will have no detox symptoms and will verbalize plan for changing substance-related behavior  Description: INTERVENTIONS:  - Monitor physical status and assess for symptoms of withdrawal  - Administer medication as ordered  - Provide emotional support with 1 on 1 interaction with staff  - Encourage recovery focused program/ addiction education  - Assess for verbalization of changing behaviors related to substance abuse  - Initiate consults and referrals as appropriate (Case Management, Spiritual Care, etc )  Outcome: Progressing  Goal: By discharge, will develop insight into their chemical dependency and sustain motivation to continue in recovery  Description: INTERVENTIONS:  - Attends all daily group sessions and scheduled AA groups  - Actively practices coping skills through participation in the therapeutic community and adherence to program rules  - Reviews and completes assignments from individual treatment plan  - Assist patient development of understanding of their personal cycle of addiction and relapse triggers  Outcome: Progressing  Goal: By discharge, patient will have ongoing treatment plan addressing chemical dependency  Description: INTERVENTIONS:  - Assist patient with resources and/or appointments for ongoing recovery based living  Outcome: Progressing     Problem: SLEEP DISTURBANCE  Goal: Will exhibit normal sleeping pattern  Description: Interventions:  -  Assess the patients sleep pattern, noting recent changes  - Administer medication as ordered  - Decrease environmental stimuli, including noise, as appropriate during the night  - Encourage the patient to actively participate in unit groups and or exercise during the day to enhance ability to achieve adequate sleep at night  - Assess the patient, in the morning, encouraging a description of sleep experience  Outcome: Progressing     Problem: SAFETY, RESTRAINT - VIOLENT/SELF-DESTRUCTIVE  Goal: Remains free of harm/injury from restraints (Restraint for Violent/Self-Destructive Behavior)  Description: INTERVENTIONS:  - Instruct patient/family regarding restraint use   - Assess and monitor physiologic and psychological status   - Provide interventions and comfort measures to meet assessed patient needs   - Ensure continuous in person monitoring is provided   - Identify and implement measures to help patient regain control  - Assess readiness for release of restraint  Outcome: Progressing  Goal: Returns to optimal restraint-free functioning  Description: INTERVENTIONS:  - Assess the patient's behavior and symptoms that indicate continued need for restraint  - Identify and implement measures to help patient regain control  - Assess readiness for release of restraint   Outcome: Progressing     Problem: DISCHARGE PLANNING  Goal: Discharge to home or other facility with appropriate resources  Description: INTERVENTIONS:  - Identify barriers to discharge w/patient and caregiver  - Arrange for needed discharge resources and transportation as appropriate  - Identify discharge learning needs (meds, wound care, etc )  - Arrange for interpretive services to assist at discharge as needed  - Refer to Case Management Department for coordinating discharge planning if the patient needs post-hospital services based on physician/advanced practitioner order or complex needs related to functional status, cognitive ability, or social support system  Outcome: Progressing     Problem: Ineffective Coping  Goal: Participates in unit activities  Description: Interventions:  - Provide therapeutic environment   - Provide required programming   - Redirect inappropriate behaviors   Outcome: Progressing

## 2022-01-14 NOTE — PROGRESS NOTES
Progress Note - [address] [facility],4Th Floor 25 y o  male MRN: [id number]  Unit/Bed#: [facility] 350-02 Encounter: [id number]    Assessment/Plan   Principal Problem:    Psychotic disorder [facility])  Active Problems:    Medical clearance for psychiatric admission    Tobacco abuse    Interview was conducted in Hebrew as patient's primary language    Subjective:Patient was seen today for continuation of care, records reviewed and  patient was discussed with the morning case review team   Shyanne Bennett was seen for a psych follow up today  Upon encounter he was eating lunch in his room while listening to the radio  He presented calm, pleasant and cooperative  He was smiling and interacting appropriately  He reported sleeping well with Trazodone dose last night  He stated that there were no groups to attend but looks forward to be active in his treatment and meet any neccessary goals for discharge  He shared that he talked to his father who will be able to picking up upon discharge; however he will be staying with his mother  He tolerated the 1st dose of [place] well yesterday  Second dose is scheduled for Monday  He denied endorsing any suicidal or homicidal ideation  Denied endorsing any AH and VH  There were no overt psychosis or Synagogue preoccupation noted  Remains medication compliance  Denies any side effects from medications      Psychiatric Review of Systems:    Sleep: improved, slept better  Appetite: normal  Medication side effects: No   ROS: no complaints, denies any cough, nausea, sedation, sore throat or tiredness, all other systems are negative    Vitals:  Vitals:    01/14/22 0828   BP: 103/53   Pulse: 89   Resp: 16   Temp: 98 °F (36 7 °C)   SpO2: 99%       Mental Status Evaluation:    Appearance:  adequate grooming, dressed in hospital attire   Behavior:  pleasant, cooperative, calm, good eye contact   Speech:  normal rate and volume, fluent, clear, more spontaneous   Mood:  improved Affect:  brighter, mood-congruent   Thought Process:  organized, goal directed, linear   Associations: intact associations   Thought Content:  no overt delusions, intrusive thoughts   Perceptual Disturbances: denies auditory or visual hallucinations when asked   Risk Potential: Suicidal ideation - None  Homicidal ideation - None  Potential for aggression - No   Sensorium:  oriented to person, place, time/date and situation   Memory:  recent and remote memory grossly intact   Consciousness:  alert and awake   Attention: attention span and concentration are improving   Insight:  fair and improving   Judgment: fair and improving   Gait/Station: normal gait/station, normal balance   Motor Activity: no abnormal movements     Laboratory results:    I have personally reviewed all pertinent laboratory/tests results    Most Recent Labs:   Lab Results   Component Value Date    WBC 7 80 01/07/2022    RBC 5 38 01/07/2022    HGB 15 7 01/07/2022    HCT 47 7 01/07/2022     01/07/2022    RDW 13 3 01/07/2022    NEUTROABS 4 10 01/07/2022    SODIUM 139 01/07/2022    K 4 1 01/07/2022     01/07/2022    CO2 25 01/07/2022    BUN 10 01/07/2022    CREATININE 0 66 (L) 01/07/2022    GLUC 131 (H) 01/07/2022    CALCIUM 9 1 01/07/2022    AST 29 01/07/2022    ALT 22 01/07/2022    ALKPHOS 57 01/07/2022    TP 8 0 01/07/2022    ALB 4 5 01/07/2022    TBILI 0 63 01/07/2022    CHOLESTEROL 123 01/07/2022    HDL 21 (L) 01/07/2022    TRIG 92 01/07/2022    LDLCALC 84 01/07/2022    NONHDLC 102 01/07/2022    [id number] 1 060 01/07/2022    RPR Non-Reactive 01/07/2022     CBC:   Lab Results   Component Value Date    WBC 7 80 01/07/2022    RBC 5 38 01/07/2022    HGB 15 7 01/07/2022    HCT 47 7 01/07/2022    MCV 89 01/07/2022     01/07/2022    MCH 29 2 01/07/2022    MCHC 32 9 01/07/2022    RDW 13 3 01/07/2022    MPV 6 9 (L) 01/07/2022    NRBC 0 01/06/2021    NEUTROABS 4 10 01/07/2022     CMP:   Lab Results   Component Value Date    SODIUM 139 01/07/2022    K 4 1 01/07/2022     01/07/2022    CO2 25 01/07/2022    AGAP 9 01/07/2022    BUN 10 01/07/2022    CREATININE 0 66 (L) 01/07/2022    GLUC 131 (H) 01/07/2022    CALCIUM 9 1 01/07/2022    AST 29 01/07/2022    ALT 22 01/07/2022    ALKPHOS 57 01/07/2022    TP 8 0 01/07/2022    ALB 4 5 01/07/2022    TBILI 0 63 01/07/2022    EGFR 140 01/07/2022     Lipid Profile:   Lab Results   Component Value Date    CHOLESTEROL 123 01/07/2022    HDL 21 (L) 01/07/2022    TRIG 92 01/07/2022    LDLCALC 84 01/07/2022    NONHDLC 102 01/07/2022     EKG   Lab Results   Component Value Date    VENTRATE 69 01/07/2022    ATRIALRATE 69 01/07/2022    PRINT 118 01/07/2022    QRSDINT 90 01/07/2022    QTINT 378 01/07/2022    QTCINT 405 01/07/2022    PAXIS -4 01/07/2022    QRSAXIS 80 01/07/2022    TWAVEAXIS 59 01/07/2022       Progress Toward Goals:     Improving  Patient's affec is brighter, agreeable with long-acting injectable  Denies all signs and symptoms      Recommended Treatment:     All current active medications have been reviewed  Encourage group therapy, milieu therapy and occupational therapy  [facility] minutes  Discharge planning for Tuesday  Family meeting with parents pending   Continue with Seth Mooney Sustenna 156 mg on 1/18/22  Check lipid profile, hemoglobin A1C and EKG in 1 month     Current Facility-Administered Medications   Medication Dose Route Frequency Provider Last Rate    acetaminophen  650 mg Oral Q6H PRN Danya Cramer MD      acetaminophen  650 mg Oral Q4H PRN Danya Cramer MD      acetaminophen  975 mg Oral Q6H PRN Danya Cramer MD      aluminum-magnesium hydroxide-simethicone  30 mL Oral Q4H PRN Danya Cramer MD      artificial tear  1 application Both Eyes X5M PRN Danya Cramer MD      haloperidol lactate  2 5 mg Intramuscular Q4H PRN Max 4/day Danya Cramer MD      And    LORazepam  1 mg Intramuscular Q4H PRN Max 4/day Danya Cramer MD And    benztropine  0 5 mg Intramuscular Q4H PRN Max 4/day Lucila Espinoza MD      haloperidol lactate  5 mg Intramuscular Q4H PRN Max 4/day Lucila Espinoza MD      And    LORazepam  2 mg Intramuscular Q4H PRN Max 4/day Lucila Espinoza MD      And    benztropine  1 mg Intramuscular Q4H PRN Max 4/day Lucila Espinoza MD      benztropine  1 mg Oral Q4H PRN Max 6/day Lucila Espinoza MD      bisacodyl  10 mg Rectal Daily PRN Lucila Espinoza MD      hydrOXYzine HCL  50 mg Oral Q6H PRN Max 4/day Lucila Espinoza MD      Or   Ria Mitchell diphenhydrAMINE  50 mg Intramuscular Q6H PRN Lucila Espinoza MD      haloperidol  1 mg Oral Q6H PRN Lucila Espinoza MD      haloperidol  2 5 mg Oral Q4H PRN Max 4/day Lucila Espinoza MD      haloperidol  5 mg Oral Q8H PRN Raine Sorenson, DO      haloperidol  5 mg Oral Q4H PRN Max 4/day Lucila Espinoza MD      haloperidol lactate  5 mg Intramuscular Q6H PRN Cynthia Merlos, DO      hydrOXYzine HCL  100 mg Oral Q6H PRN Max 4/day Lucila Espinoza MD      Or   Ria Mitchell LORazepam  2 mg Intramuscular Q6H PRN Lucila Espinoza MD      hydrOXYzine HCL  25 mg Oral Q6H PRN Max 4/day Lucila Espinoza MD      ibuprofen  400 mg Oral Q8H PRN Cynthia Ghotraock, DO      LORazepam  2 mg Intramuscular Q6H PRN Erin L Bendock, DO      LORazepam  2 mg Oral Q8H PRN Erin L Bendock, DO      melatonin  3 mg Oral HS PRN CHRISTINA Alamo      [START ON 1/18/2022] paliperidone palmitate ER  156 mg Intramuscular Once KHRIS AlamoNP      polyethylene glycol  17 g Oral Daily PRN Lucila Espinoza MD      senna-docusate sodium  1 tablet Oral Daily PRN Lucila Espinoza MD      traZODone  50 mg Oral HS CHRISTINA Alamo         Risks / Benefits of Treatment:     Risks, benefits, and possible side effects of medications explained to patient and patient verbalizes understanding and agreement for treatment      Counseling / Coordination of Care:     Patient's progress reviewed with nursing staff  Medications, treatment progress and treatment plan reviewed with patient  Supportive counseling provided to the patient            Audrey Kim

## 2022-01-14 NOTE — PROGRESS NOTES
01/14/22 0941   Team Meeting   Meeting Type Daily Rounds   Team Members Present   Team Members Present Physician;Nurse;   Physician Team Member Dr Mily Garcia Team Member [facility],[address] [place] [address] Management Team Member Gabriela   Patient/Family Present   Patient Present No   Patient's Family Present No   Pt medication compliant, received Eunice Salinas yesterday, booster for 1/18  Pt denies symptoms   Pt pending discharge for Wednesday 1/19

## 2022-01-15 PROCEDURE — U0003 INFECTIOUS AGENT DETECTION BY NUCLEIC ACID (DNA OR RNA); SEVERE ACUTE RESPIRATORY SYNDROME CORONAVIRUS 2 (SARS-COV-2) (CORONAVIRUS DISEASE [COVID-19]), AMPLIFIED PROBE TECHNIQUE, MAKING USE OF HIGH THROUGHPUT TECHNOLOGIES AS DESCRIBED BY CMS-2020-01-R: HCPCS | Performed by: PHYSICIAN ASSISTANT

## 2022-01-15 PROCEDURE — U0005 INFEC AGEN DETEC AMPLI PROBE: HCPCS | Performed by: PHYSICIAN ASSISTANT

## 2022-01-15 PROCEDURE — 99232 SBSQ HOSP IP/OBS MODERATE 35: CPT | Performed by: PSYCHIATRY & NEUROLOGY

## 2022-01-15 RX ADMIN — TRAZODONE HYDROCHLORIDE 50 MG: 50 TABLET ORAL at 21:21

## 2022-01-15 NOTE — PROGRESS NOTES
Progress Note - [address] [facility],4Th Floor 25 y o  male MRN: [id number]  Unit/Bed#: -01 Encounter: @CSN        Assessment/Plan   Principal Problem:    Psychotic disorder ([address] )  Active Problems:    Medical clearance for psychiatric admission    Tobacco abuse      Subjective: The patient was seen today for continuing care and reviewed with treatment team     [place]  reports that he has been compliant with in the unit and following direction  He is able to think clearly better  He denies having any perceptual disturbances  He is looking for to his 2nd dose of [place] next week and hopeful that he will be discharged  He terms his mood is better  Denies having any passive death wishes or active SI or HI  Due to COVID exposure because there has not been any activities therefore he is mostly in his room  No delusions elicited at this time  He slept through the night  Patient is able to contract  for safety, verbally at this time  No management issues reported by staff overnight      Current Medications:  Current Facility-Administered Medications   Medication Dose Route Frequency Provider Last Rate    acetaminophen  650 mg Oral Q6H PRN Karen Wolff MD      acetaminophen  650 mg Oral Q4H PRN Karen Wolff MD      acetaminophen  975 mg Oral Q6H PRN Karen Wolff MD      aluminum-magnesium hydroxide-simethicone  30 mL Oral Q4H PRN Karen Wolff MD      artificial tear  1 application Both Eyes J5F PRN Karen Wolff MD      haloperidol lactate  2 5 mg Intramuscular Q4H PRN Max 4/day Karen Wolff MD      And    LORazepam  1 mg Intramuscular Q4H PRN Max 4/day Karen Wolff MD      And    benztropine  0 5 mg Intramuscular Q4H PRN Max 4/day Karen Wolff MD      haloperidol lactate  5 mg Intramuscular Q4H PRN Max 4/day Karen Wolff MD      And    LORazepam  2 mg Intramuscular Q4H PRN Max 4/day Karen Wolff MD      And    benztropine  1 mg Intramuscular Q4H PRN Max 4/day Shanna Maria MD      benztropine  1 mg Oral Q4H PRN Max 6/day Shanna Maria MD      bisacodyl  10 mg Rectal Daily PRN Shanna Maria MD      hydrOXYzine HCL  50 mg Oral Q6H PRN Max 4/day Shanna Maria MD      Or   Tea Mccoy diphenhydrAMINE  50 mg Intramuscular Q6H PRN Shanna Maria MD      haloperidol  1 mg Oral Q6H PRN Shanna Maria MD      haloperidol  2 5 mg Oral Q4H PRN Max 4/day Shanna Maria MD      haloperidol  5 mg Oral Q8H PRN Kassy Amor, DO      haloperidol  5 mg Oral Q4H PRN Max 4/day Shanna Maria MD      haloperidol lactate  5 mg Intramuscular Q6H PRN Ova Mantle Bendock, DO      hydrOXYzine HCL  100 mg Oral Q6H PRN Max 4/day Shanna Maria MD      Or   Tea Mccoy LORazepam  2 mg Intramuscular Q6H PRN Shanna Maria MD      hydrOXYzine HCL  25 mg Oral Q6H PRN Max 4/day Shanna Maria MD      ibuprofen  400 mg Oral Q8H PRN Ova Mantle Bendock, DO      LORazepam  2 mg Intramuscular Q6H PRN Erin L Bendock, DO      LORazepam  2 mg Oral Q8H PRN Erin L Bendock, DO      melatonin  3 mg Oral HS PRN KHRIS AlamoNP      [START ON 1/18/2022] paliperidone palmitate ER  156 mg Intramuscular Once Jo Moralesat, CRNP      polyethylene glycol  17 g Oral Daily PRN Shanna Maria MD      senna-docusate sodium  1 tablet Oral Daily PRN Shanna Maria MD      traZODone  50 mg Oral HS Jo KHARI Moralesat, CRNP         Behavioral Health Medications: all current active meds have been reviewed and continue current psychiatric medications  Vital signs in last 24 hours:  Temp:  [97 3 °F (36 3 °C)-98 °F (36 7 °C)] 97 3 °F (36 3 °C)  HR:  [76-85] 76  Resp:  [16] 16  BP: ()/(57-68) 99/57    Laboratory results:    I have personally reviewed all pertinent laboratory/tests results    Most Recent Labs:   Lab Results   Component Value Date    WBC 7 80 01/07/2022    RBC 5 38 01/07/2022    HGB 15 7 01/07/2022    HCT 47 7 01/07/2022  01/07/2022    RDW 13 3 01/07/2022    NEUTROABS 4 10 01/07/2022    SODIUM 139 01/07/2022    K 4 1 01/07/2022     01/07/2022    CO2 25 01/07/2022    BUN 10 01/07/2022    CREATININE 0 66 (L) 01/07/2022    GLUC 131 (H) 01/07/2022    CALCIUM 9 1 01/07/2022    AST 29 01/07/2022    ALT 22 01/07/2022    ALKPHOS 57 01/07/2022    TP 8 0 01/07/2022    ALB 4 5 01/07/2022    TBILI 0 63 01/07/2022    CHOLESTEROL 123 01/07/2022    HDL 21 (L) 01/07/2022    TRIG 92 01/07/2022    LDLCALC 84 01/07/2022    NONHDLC 102 01/07/2022    [id number] 1 060 01/07/2022    RPR Non-Reactive 01/07/2022       Psychiatric Review of Systems:  Behavior over the last 24 hours: improving  Sleep: normal  Appetite: normal  Medication side effects: No  ROS: no complaints, all other systems negative    Mental Status Evaluation:  Appearance:  age appropriate and casually dressed   Behavior:  cooperative   Speech:  normal pitch and normal volume   Mood:  better   Affect:  constricted   Thought Process:  logical   Thought Content:  No delusions elicited   Perceptual Disturbances: None   Risk Potential: Suicidal Ideations none, Homicidal Ideations none and Potential for Aggression No   Sensorium:  person, place, time/date and situation   Consciousness:  alert    Insight:  improving    Judgment: improving   Gait/Station: normal gait/station   Motor Activity: no abnormal movements       Progress Toward Goals: Continues to make progress  Recommended Treatment: 1  Continue with group therapy, milieu therapy and occupational therapy     2 Continue following current medications:   Current Facility-Administered Medications   Medication Dose Route Frequency Provider Last Rate    acetaminophen  650 mg Oral Q6H PRN Rochelle Rowland MD      acetaminophen  650 mg Oral Q4H PRN Rochelle Rowland MD      acetaminophen  975 mg Oral Q6H PRN Rochelle Rowland MD      aluminum-magnesium hydroxide-simethicone  30 mL Oral Q4H PRN MD Gatito León artificial tear  1 application Both Eyes A7T PRN Jean Sen MD      haloperidol lactate  2 5 mg Intramuscular Q4H PRN Max 4/day Jean Sen MD      And    LORazepam  1 mg Intramuscular Q4H PRN Max 4/day Jean Sen MD      And    benztropine  0 5 mg Intramuscular Q4H PRN Max 4/day Jean Sen MD      haloperidol lactate  5 mg Intramuscular Q4H PRN Max 4/day Jean Sen MD      And    LORazepam  2 mg Intramuscular Q4H PRN Max 4/day Jean Sen MD      And    benztropine  1 mg Intramuscular Q4H PRN Max 4/day Jean Sen MD      benztropine  1 mg Oral Q4H PRN Max 6/day Jean Sen MD      bisacodyl  10 mg Rectal Daily PRN Jean Sen MD      hydrOXYzine HCL  50 mg Oral Q6H PRN Max 4/day Jean Sen MD      Or   [facility] [place] diphenhydrAMINE  50 mg Intramuscular Q6H PRN Jean Sen MD      haloperidol  1 mg Oral Q6H PRN Jean Sen MD      haloperidol  2 5 mg Oral Q4H PRN Max 4/day Jean Sen MD      haloperidol  5 mg Oral Q8H PRN Amber Ficks, DO      haloperidol  5 mg Oral Q4H PRN Max 4/day Jean Sen MD      haloperidol lactate  5 mg Intramuscular Q6H PRN Kusum Merlos, DO      hydrOXYzine HCL  100 mg Oral Q6H PRN Max 4/day Jean Sen MD      Or   Christianne Courser LORazepam  2 mg Intramuscular Q6H PRN Jean Sen MD      hydrOXYzine HCL  25 mg Oral Q6H PRN Max 4/day Jean Sen MD      ibuprofen  400 mg Oral Q8H PRN Kusum Gibbs Bendock, DO      LORazepam  2 mg Intramuscular Q6H PRN Erin KHARI Bendock, DO      LORazepam  2 mg Oral Q8H PRN Erin L Bendock, DO      melatonin  3 mg Oral HS PRN CHRISTINA Alamo      [START ON 1/18/2022] paliperidone palmitate ER  156 mg Intramuscular Once CHRISTINA Alamo      polyethylene glycol  17 g Oral Daily PRN Jean Sen MD      senna-docusate sodium  1 tablet Oral Daily PRN Jean Sen MD      traZODone  50 mg Oral HS CHRISTINA Alamo Risks, benefits and possible side effects of Medications:   Risks, benefits, and possible side effects of medications explained to patient and patient verbalizes understanding  This note has been constructed using a voice recognition system  Occasional wrong word or "sound a like" substitutions may have occurred due to the inherent limitations of voice recognition software  There may be translation, syntax,  or grammatical errors  If you have any questions, please contact the dictating provider      Inez Miller MD  01/15/22

## 2022-01-15 NOTE — NURSING NOTE
Pt isolative to room  Sleeping throughout morning  No SI/HI  Scant in conversation  Denies any issues/concerns

## 2022-01-15 NOTE — NURSING NOTE
Pt remains pleasant and cooperative  No SI/HI  Denies all symptoms  Intermittently visible in milieu  Denies any issues/concerns

## 2022-01-16 LAB — SARS-COV-2 RNA RESP QL NAA+PROBE: NEGATIVE

## 2022-01-16 PROCEDURE — 99232 SBSQ HOSP IP/OBS MODERATE 35: CPT | Performed by: PSYCHIATRY & NEUROLOGY

## 2022-01-16 RX ADMIN — TRAZODONE HYDROCHLORIDE 50 MG: 50 TABLET ORAL at 21:06

## 2022-01-16 NOTE — PLAN OF CARE
Problem: SELF HARM/SUICIDALITY  Goal: Will have no self-injury during hospital stay  Description: INTERVENTIONS:  - Q 15 MINUTES: Routine safety checks  - Q WAKING SHIFT & PRN: Assess risk to determine if routine checks are adequate to maintain patient safety  - Encourage patient to participate actively in care by formulating a plan to combat response to suicidal ideation, identify supports and resources  Outcome: Progressing     Problem: DEPRESSION  Goal: Will be euthymic at discharge  Description: INTERVENTIONS:  - Administer medication as ordered  - Provide emotional support via 1:1 interaction with staff  - Encourage involvement in milieu/groups/activities  - Monitor for social isolation  Outcome: Progressing     Problem: PSYCHOSIS  Goal: Will report no hallucinations or delusions  Description: Interventions:  - Administer medication as  ordered  - Every waking shifts and PRN assess for the presence of hallucinations and or delusions  - Assist with reality testing to support increasing orientation  - Assess if patient's hallucinations or delusions are encouraging self-harm or harm to others and intervene as appropriate  Outcome: Progressing     Problem: ANXIETY  Goal: Will report anxiety at manageable levels  Description: INTERVENTIONS:  - Administer medication as ordered  - Teach and encourage coping skills  - Provide emotional support  - Assess patient/family for anxiety and ability to cope  Outcome: Progressing  Goal: By discharge: Patient will verbalize 2 strategies to deal with anxiety  Description: Interventions:  - Identify any obvious source/trigger to anxiety  - Staff will assist patient in applying identified coping technique/skills  - Encourage attendance of scheduled groups and activities  Outcome: Progressing     Problem: SUBSTANCE USE/ABUSE  Goal: Will have no detox symptoms and will verbalize plan for changing substance-related behavior  Description: INTERVENTIONS:  - Monitor physical status and assess for symptoms of withdrawal  - Administer medication as ordered  - Provide emotional support with 1 on 1 interaction with staff  - Encourage recovery focused program/ addiction education  - Assess for verbalization of changing behaviors related to substance abuse  - Initiate consults and referrals as appropriate (Case Management, Spiritual Care, etc )  Outcome: Progressing  Goal: By discharge, will develop insight into their chemical dependency and sustain motivation to continue in recovery  Description: INTERVENTIONS:  - Attends all daily group sessions and scheduled AA groups  - Actively practices coping skills through participation in the therapeutic community and adherence to program rules  - Reviews and completes assignments from individual treatment plan  - Assist patient development of understanding of their personal cycle of addiction and relapse triggers  Outcome: Progressing  Goal: By discharge, patient will have ongoing treatment plan addressing chemical dependency  Description: INTERVENTIONS:  - Assist patient with resources and/or appointments for ongoing recovery based living  Outcome: Progressing     Problem: SLEEP DISTURBANCE  Goal: Will exhibit normal sleeping pattern  Description: Interventions:  -  Assess the patients sleep pattern, noting recent changes  - Administer medication as ordered  - Decrease environmental stimuli, including noise, as appropriate during the night  - Encourage the patient to actively participate in unit groups and or exercise during the day to enhance ability to achieve adequate sleep at night  - Assess the patient, in the morning, encouraging a description of sleep experience  Outcome: Progressing

## 2022-01-16 NOTE — PROGRESS NOTES
Progress Note - [address] [facility],[address] 25 y o  male MRN: [id number]  Unit/Bed#: -01 Encounter: @CSN        Assessment/Plan   Principal Problem:    Psychotic disorder ([address] )  Active Problems:    Medical clearance for psychiatric admission    Tobacco abuse      Subjective: The patient was seen today for continuing care and reviewed with treatment team     Jacqueline Flower  reports that he is progressing well  He is looking for to discharge within next 2 days after receiving the 2nd dose of his Invega shot  He reports compared to admission he feels he is able to thing better  He has mood has been better  He is not as depressed and has anxious as before  He also denies having any perceptual disturbances at this time, and feels it has significantly improved  He has been more social, following directions, interacting with peers and staff in the unit  No bouts of aggression reported  He is med compliant  Slept through the night  Denies any active SI or HI at this time  No management issues reported by staff overnight      Current Medications:  Current Facility-Administered Medications   Medication Dose Route Frequency Provider Last Rate    acetaminophen  650 mg Oral Q6H PRN Wili Cohen MD      acetaminophen  650 mg Oral Q4H PRN Wili Cohen MD      acetaminophen  975 mg Oral Q6H PRN Wili Cohen MD      aluminum-magnesium hydroxide-simethicone  30 mL Oral Q4H PRN Wili Cohen MD      artificial tear  1 application Both Eyes V6H PRN Wili Cohen MD      haloperidol lactate  2 5 mg Intramuscular Q4H PRN Max 4/day Wili Cohen MD      And    LORazepam  1 mg Intramuscular Q4H PRN Max 4/day Wili Cohen MD      And    benztropine  0 5 mg Intramuscular Q4H PRN Max 4/day Wili Cohen MD      haloperidol lactate  5 mg Intramuscular Q4H PRN Max 4/day Wili Cohen MD      And    LORazepam  2 mg Intramuscular Q4H PRN Max 4/day Wili Cohen MD      And    benztropine  1 mg Intramuscular Q4H PRN Max 4/day Wili Client, MD      benztropine  1 mg Oral Q4H PRN Max 6/day Wili Client, MD      bisacodyl  10 mg Rectal Daily PRN Wili Client, MD      hydrOXYzine HCL  50 mg Oral Q6H PRN Max 4/day Wili Client, MD Terell Cruz diphenhydrAMINE  50 mg Intramuscular Q6H PRN Wili Client, MD      haloperidol  1 mg Oral Q6H PRN Wili Client, MD      haloperidol  2 5 mg Oral Q4H PRN Max 4/day Wili Client, MD      haloperidol  5 mg Oral Q8H PRN Efraín Pardo, DO      haloperidol  5 mg Oral Q4H PRN Max 4/day Wili Client, MD      haloperidol lactate  5 mg Intramuscular Q6H PRN Kalpesh Merlos, DO      hydrOXYzine HCL  100 mg Oral Q6H PRN Max 4/day Wili Client, MD Terell Cruz LORazepam  2 mg Intramuscular Q6H PRN Wili Client, MD      hydrOXYzine HCL  25 mg Oral Q6H PRN Max 4/day Wili Client, MD      ibuprofen  400 mg Oral Q8H PRN Kalpesh Gohtraock, DO      LORazepam  2 mg Intramuscular Q6H PRN Jess Socrates Ghotraock, DO      LORazepam  2 mg Oral Q8H PRN Erin L Bendock, DO      melatonin  3 mg Oral HS PRN Jochapito Moralesat, CRNP      [START ON 1/18/2022] paliperidone palmitate ER  156 mg Intramuscular Once Jo L Jaqueline, CRNP      polyethylene glycol  17 g Oral Daily PRN Wili Client, MD      senna-docusate sodium  1 tablet Oral Daily PRN Wili Client, MD      traZODone  50 mg Oral HS Jo L Jaqueline, CRNP         Behavioral Health Medications: all current active meds have been reviewed and continue current psychiatric medications  Vital signs in last 24 hours:  Temp:  [97 8 °F (36 6 °C)-98 9 °F (37 2 °C)] 97 8 °F (36 6 °C)  HR:  [74-84] 84  Resp:  [16] 16  BP: ()/(55-58) 96/55    Laboratory results:    I have personally reviewed all pertinent laboratory/tests results    Most Recent Labs:   Lab Results   Component Value Date    WBC 7 80 01/07/2022    RBC 5 38 01/07/2022    HGB 15 7 01/07/2022    HCT 47 7 01/07/2022     01/07/2022    RDW 13 3 01/07/2022    NEUTROABS 4 10 01/07/2022    SODIUM 139 01/07/2022    K 4 1 01/07/2022     01/07/2022    CO2 25 01/07/2022    BUN 10 01/07/2022    CREATININE 0 66 (L) 01/07/2022    GLUC 131 (H) 01/07/2022    CALCIUM 9 1 01/07/2022    AST 29 01/07/2022    ALT 22 01/07/2022    ALKPHOS 57 01/07/2022    TP 8 0 01/07/2022    ALB 4 5 01/07/2022    TBILI 0 63 01/07/2022    CHOLESTEROL 123 01/07/2022    HDL 21 (L) 01/07/2022    TRIG 92 01/07/2022    LDLCALC 84 01/07/2022    NONHDLC 102 01/07/2022    SAD6BGXEONSS 1 060 01/07/2022    RPR Non-Reactive 01/07/2022       Psychiatric Review of Systems:  Behavior over the last 24 hours: improving  Sleep: normal  Appetite: normal  Medication side effects: No  ROS: no complaints, all other systems negative    Mental Status Evaluation:  Appearance:  age appropriate and tattooed   Behavior:  cooperative   Speech:  normal pitch and normal volume   Mood:  better than before   Affect:  constricted   Thought Process:  logical   Thought Content:  No delusions elicited   Perceptual Disturbances: None   Risk Potential: Suicidal Ideations none, Homicidal Ideations none and Potential for Aggression Yes due to prior history he was under the influence   Sensorium:  person, place, time/date and situation   Consciousness:  alert and awake    Insight:  imroving    Judgment: imroving   Gait/Station: normal gait/station   Motor Activity: no abnormal movements       Progress Toward Goals:  Progressing  Recommended Treatment: 1  Continue with group therapy, milieu therapy and occupational therapy  2  Second Invega shot tomorrow    3  Continue following current medications:   Current Facility-Administered Medications   Medication Dose Route Frequency Provider Last Rate    acetaminophen  650 mg Oral Q6H PRN Tamar Shipley MD      acetaminophen  650 mg Oral Q4H PRN Tamar Shipley MD      acetaminophen  975 mg Oral Q6H PRN Kennethe Stalling, MD      aluminum-magnesium hydroxide-simethicone  30 mL Oral Q4H PRN [facility] Stalling, MD      artificial tear  1 application Both Eyes E4W PRN Kennethe Stalling, MD      haloperidol lactate  2 5 mg Intramuscular Q4H PRN Max 4/day [facility] Stalling, MD      And    LORazepam  1 mg Intramuscular Q4H PRN Max 4/day [facility] Stalling, MD      And    benztropine  0 5 mg Intramuscular Q4H PRN Max 4/day [facility] Stalling, MD      haloperidol lactate  5 mg Intramuscular Q4H PRN Max 4/day [facility] Stalling, MD      And    LORazepam  2 mg Intramuscular Q4H PRN Max 4/day [facility] Stalling, MD      And    benztropine  1 mg Intramuscular Q4H PRN Max 4/day [facility] Stalling, MD      benztropine  1 mg Oral Q4H PRN Max 6/day [facility] Stalling, MD      bisacodyl  10 mg Rectal Daily PRN [facility] Stalling, MD      hydrOXYzine HCL  50 mg Oral Q6H PRN Max 4/day [facility] Stalling, MD Terell Tay diphenhydrAMINE  50 mg Intramuscular Q6H PRN [facility] Stalling, MD      haloperidol  1 mg Oral Q6H PRN [facility] Stalling, MD      haloperidol  2 5 mg Oral Q4H PRN Max 4/day [facility] Stalling, MD      haloperidol  5 mg Oral Q8H PRN Nury Cocker, DO      haloperidol  5 mg Oral Q4H PRN Max 4/day [facility] Stalling, MD      haloperidol lactate  5 mg Intramuscular Q6H PRN Mitali Gill Bendock, DO      hydrOXYzine HCL  100 mg Oral Q6H PRN Max 4/day [facility] Stalling, MD Terell Tay LORazepam  2 mg Intramuscular Q6H PRN [facility] Stalling, MD      hydrOXYzine HCL  25 mg Oral Q6H PRN Max 4/day [facility] Stalling, MD      ibuprofen  400 mg Oral Q8H PRN Mitali Hook Bendock, DO      LORazepam  2 mg Intramuscular Q6H PRN Erin L Bendock, DO      LORazepam  2 mg Oral Q8H PRN Erin L Bendock, DO      melatonin  3 mg Oral HS PRN CHRISTINA Alamo      [START ON 1/18/2022] paliperidone palmitate ER  156 mg Intramuscular Once CHRISTINA Alamo      polyethylene glycol  17 g Oral Daily PRN MD Nicole Huang-dayneusate sodium  1 tablet Oral Daily PRN Madelaine Stiles MD      traZODone  50 mg Oral HS CHRISTINA Alamo         Risks, benefits and possible side effects of Medications:   Risks, benefits, and possible side effects of medications explained to patient and patient verbalizes understanding  This note has been constructed using a voice recognition system  Occasional wrong word or "sound a like" substitutions may have occurred due to the inherent limitations of voice recognition software  There may be translation, syntax,  or grammatical errors  If you have any questions, please contact the dictating provider      Jaclyn Henderson MD  01/16/22

## 2022-01-16 NOTE — NURSING NOTE
Pt pleasant and appropriate  No SI/HI  Denies depression and anxiety  Adequate sleep and appetite  Remains compliant with scheduled medications  Denies any issues/concerns

## 2022-01-16 NOTE — NURSING NOTE
Patient was calm and cooperative during interactions with this nurse  During 1:1 patient denied SI, HI, hallucinations, and agreed to notify staff immediately if he began to experience any of these symptoms

## 2022-01-17 PROBLEM — Z00.8 MEDICAL CLEARANCE FOR PSYCHIATRIC ADMISSION: Status: RESOLVED | Noted: 2021-01-08 | Resolved: 2022-01-17

## 2022-01-17 PROBLEM — F20.9 SCHIZOPHRENIA (HCC): Status: ACTIVE | Noted: 2022-01-06

## 2022-01-17 PROCEDURE — 99231 SBSQ HOSP IP/OBS SF/LOW 25: CPT | Performed by: STUDENT IN AN ORGANIZED HEALTH CARE EDUCATION/TRAINING PROGRAM

## 2022-01-17 RX ORDER — TRAZODONE HYDROCHLORIDE 50 MG/1
50 TABLET ORAL
Qty: 30 TABLET | Refills: 1 | Status: SHIPPED | OUTPATIENT
Start: 2022-01-17 | End: 2022-02-16

## 2022-01-17 RX ADMIN — TRAZODONE HYDROCHLORIDE 50 MG: 50 TABLET ORAL at 21:08

## 2022-01-17 NOTE — TREATMENT TEAM
01/17/22 0857   Team Meeting   Meeting Type Daily Rounds   Team Members Present   Team Members Present Physician;Nurse;   Physician Team Member Clif Aguilar   Nursing Team Member KHLOE MOFFETT [facility] Management Team Member Alejandra   Patient/Family Present   Patient Present No   Patient's Family Present No     Pt calm and cooperative  Compliant with meds and meals  Denies symptoms  Continue meds  Discharge home tomorrow  Continue to monitor

## 2022-01-17 NOTE — NURSING NOTE
Patient received awake, in tv room watching tv with peers  Dressed in gowns  Quiet, calm, guarded  Denied distress, denied SI/HI, AH/VH, denied anxiety  No distress observed  Interacts appropriately with peers and staff  Currently resting in bed  Ate breakfast   No complaints offered

## 2022-01-17 NOTE — PLAN OF CARE
Problem: SELF HARM/SUICIDALITY  Goal: Will have no self-injury during hospital stay  Description: INTERVENTIONS:  - Q 15 MINUTES: Routine safety checks  - Q WAKING SHIFT & PRN: Assess risk to determine if routine checks are adequate to maintain patient safety  - Encourage patient to participate actively in care by formulating a plan to combat response to suicidal ideation, identify supports and resources  1/17/2022 0731 by Harleen Nina RN  Outcome: Progressing  1/17/2022 0730 by Harleen Nina RN  Outcome: Progressing     Problem: DEPRESSION  Goal: Will be euthymic at discharge  Description: INTERVENTIONS:  - Administer medication as ordered  - Provide emotional support via 1:1 interaction with staff  - Encourage involvement in milieu/groups/activities  - Monitor for social isolation  1/17/2022 0731 by Harleen Nina RN  Outcome: Progressing  1/17/2022 0730 by Harleen Nina RN  Outcome: Progressing     Problem: PSYCHOSIS  Goal: Will report no hallucinations or delusions  Description: Interventions:  - Administer medication as  ordered  - Every waking shifts and PRN assess for the presence of hallucinations and or delusions  - Assist with reality testing to support increasing orientation  - Assess if patient's hallucinations or delusions are encouraging self-harm or harm to others and intervene as appropriate  1/17/2022 0731 by Harleen Nina RN  Outcome: Progressing  1/17/2022 0730 by Harleen Nina RN  Outcome: Progressing     Problem: ANXIETY  Goal: Will report anxiety at manageable levels  Description: INTERVENTIONS:  - Administer medication as ordered  - Teach and encourage coping skills  - Provide emotional support  - Assess patient/family for anxiety and ability to cope  1/17/2022 0731 by Harleen Nina RN  Outcome: Progressing  1/17/2022 0730 by Harleen Nina RN  Outcome: Progressing  Goal: By discharge: Patient will verbalize 2 strategies to deal with anxiety  Description: Interventions:  - Identify any obvious source/trigger to anxiety  - Staff will assist patient in applying identified coping technique/skills  - Encourage attendance of scheduled groups and activities  1/17/2022 0731 by Svetlana Stanley RN  Outcome: Progressing  1/17/2022 0730 by Svetlana Stanley RN  Outcome: Progressing     Problem: SUBSTANCE USE/ABUSE  Goal: Will have no detox symptoms and will verbalize plan for changing substance-related behavior  Description: INTERVENTIONS:  - Monitor physical status and assess for symptoms of withdrawal  - Administer medication as ordered  - Provide emotional support with 1 on 1 interaction with staff  - Encourage recovery focused program/ addiction education  - Assess for verbalization of changing behaviors related to substance abuse  - Initiate consults and referrals as appropriate (Case Management, Spiritual Care, etc )  1/17/2022 0731 by Svetlana Stanley RN  Outcome: Progressing  1/17/2022 0730 by Svetlana Stanley RN  Outcome: Progressing  Goal: By discharge, will develop insight into their chemical dependency and sustain motivation to continue in recovery  Description: INTERVENTIONS:  - Attends all daily group sessions and scheduled AA groups  - Actively practices coping skills through participation in the therapeutic community and adherence to program rules  - Reviews and completes assignments from individual treatment plan  - Assist patient development of understanding of their personal cycle of addiction and relapse triggers  1/17/2022 0731 by Svetlana Stanley RN  Outcome: Progressing  1/17/2022 0730 by Svetlana Stanley RN  Outcome: Progressing  Goal: By discharge, patient will have ongoing treatment plan addressing chemical dependency  Description: INTERVENTIONS:  - Assist patient with resources and/or appointments for ongoing recovery based living  1/17/2022 0731 by Svetlana Stanley RN  Outcome: Progressing  1/17/2022 0730 by Svetlana Stanley RN  Outcome: Progressing     Problem: SLEEP DISTURBANCE  Goal: Will exhibit normal sleeping pattern  Description: Interventions:  -  Assess the patients sleep pattern, noting recent changes  - Administer medication as ordered  - Decrease environmental stimuli, including noise, as appropriate during the night  - Encourage the patient to actively participate in unit groups and or exercise during the day to enhance ability to achieve adequate sleep at night  - Assess the patient, in the morning, encouraging a description of sleep experience  1/17/2022 0731 by Raina King RN  Outcome: Progressing  1/17/2022 0730 by Raina King RN  Outcome: Progressing     Problem: SAFETY, RESTRAINT - VIOLENT/SELF-DESTRUCTIVE  Goal: Remains free of harm/injury from restraints (Restraint for Violent/Self-Destructive Behavior)  Description: INTERVENTIONS:  - Instruct patient/family regarding restraint use   - Assess and monitor physiologic and psychological status   - Provide interventions and comfort measures to meet assessed patient needs   - Ensure continuous in person monitoring is provided   - Identify and implement measures to help patient regain control  - Assess readiness for release of restraint  1/17/2022 0731 by Raina King RN  Outcome: Progressing  1/17/2022 0730 by Raina King RN  Outcome: Progressing  Goal: Returns to optimal restraint-free functioning  Description: INTERVENTIONS:  - Assess the patient's behavior and symptoms that indicate continued need for restraint  - Identify and implement measures to help patient regain control  - Assess readiness for release of restraint   1/17/2022 0731 by Raina King RN  Outcome: Progressing  1/17/2022 0730 by Raina King RN  Outcome: Progressing     Problem: DISCHARGE PLANNING  Goal: Discharge to home or other facility with appropriate resources  Description: INTERVENTIONS:  - Identify barriers to discharge w/patient and caregiver  - Arrange for needed discharge resources and transportation as appropriate  - Identify discharge learning needs (meds, wound care, etc )  - Arrange for interpretive services to assist at discharge as needed  - Refer to Case Management Department for coordinating discharge planning if the patient needs post-hospital services based on physician/advanced practitioner order or complex needs related to functional status, cognitive ability, or social support system  1/17/2022 0731 by Svetlana Stanley RN  Outcome: Progressing  1/17/2022 0730 by Svetlana Stanley RN  Outcome: Progressing     Problem: Ineffective Coping  Goal: Participates in unit activities  Description: Interventions:  - Provide therapeutic environment   - Provide required programming   - Redirect inappropriate behaviors   1/17/2022 0731 by Svetlana Stanley RN  Outcome: Progressing  1/17/2022 0730 by Svetlana Stanley RN  Outcome: Progressing

## 2022-01-17 NOTE — PLAN OF CARE
Pt states he is happy that "I am going home tomorrow"  Discussed discharge plan; MARIN booster to be administered tomorrow  Follow up appointment at [facility] discussed  Pt's meds will be sent to [facility] pharmacy  Pt was provided positive feedback for his compliance to treatment  Pt is in agreement to follow up with outpatient treatment  Pt denied having SI, HI and AVH  Continue to monitor   D/c home with father tomorrow at 15 PM        Problem: SELF HARM/SUICIDALITY  Goal: Will have no self-injury during hospital stay  Description: INTERVENTIONS:  - Q 15 MINUTES: Routine safety checks  - Q WAKING SHIFT & PRN: Assess risk to determine if routine checks are adequate to maintain patient safety  - Encourage patient to participate actively in care by formulating a plan to combat response to suicidal ideation, identify supports and resources  Outcome: Progressing     Problem: DEPRESSION  Goal: Will be euthymic at discharge  Description: INTERVENTIONS:  - Administer medication as ordered  - Provide emotional support via 1:1 interaction with staff  - Encourage involvement in milieu/groups/activities  - Monitor for social isolation  Outcome: Progressing     Problem: PSYCHOSIS  Goal: Will report no hallucinations or delusions  Description: Interventions:  - Administer medication as  ordered  - Every waking shifts and PRN assess for the presence of hallucinations and or delusions  - Assist with reality testing to support increasing orientation  - Assess if patient's hallucinations or delusions are encouraging self-harm or harm to others and intervene as appropriate  Outcome: Progressing     Problem: ANXIETY  Goal: Will report anxiety at manageable levels  Description: INTERVENTIONS:  - Administer medication as ordered  - Teach and encourage coping skills  - Provide emotional support  - Assess patient/family for anxiety and ability to cope  Outcome: Progressing  Goal: By discharge: Patient will verbalize 2 strategies to deal with anxiety  Description: Interventions:  - Identify any obvious source/trigger to anxiety  - Staff will assist patient in applying identified coping technique/skills  - Encourage attendance of scheduled groups and activities  Outcome: Progressing     Problem: SUBSTANCE USE/ABUSE  Goal: Will have no detox symptoms and will verbalize plan for changing substance-related behavior  Description: INTERVENTIONS:  - Monitor physical status and assess for symptoms of withdrawal  - Administer medication as ordered  - Provide emotional support with 1 on 1 interaction with staff  - Encourage recovery focused program/ addiction education  - Assess for verbalization of changing behaviors related to substance abuse  - Initiate consults and referrals as appropriate (Case Management, Spiritual Care, etc )  Outcome: Progressing  Goal: By discharge, will develop insight into their chemical dependency and sustain motivation to continue in recovery  Description: INTERVENTIONS:  - Attends all daily group sessions and scheduled AA groups  - Actively practices coping skills through participation in the therapeutic community and adherence to program rules  - Reviews and completes assignments from individual treatment plan  - Assist patient development of understanding of their personal cycle of addiction and relapse triggers  Outcome: Progressing  Goal: By discharge, patient will have ongoing treatment plan addressing chemical dependency  Description: INTERVENTIONS:  - Assist patient with resources and/or appointments for ongoing recovery based living  Outcome: Progressing     Problem: SLEEP DISTURBANCE  Goal: Will exhibit normal sleeping pattern  Description: Interventions:  -  Assess the patients sleep pattern, noting recent changes  - Administer medication as ordered  - Decrease environmental stimuli, including noise, as appropriate during the night  - Encourage the patient to actively participate in unit groups and or exercise during the day to enhance ability to achieve adequate sleep at night  - Assess the patient, in the morning, encouraging a description of sleep experience  Outcome: Progressing     Problem: SAFETY, RESTRAINT - VIOLENT/SELF-DESTRUCTIVE  Goal: Remains free of harm/injury from restraints (Restraint for Violent/Self-Destructive Behavior)  Description: INTERVENTIONS:  - Instruct patient/family regarding restraint use   - Assess and monitor physiologic and psychological status   - Provide interventions and comfort measures to meet assessed patient needs   - Ensure continuous in person monitoring is provided   - Identify and implement measures to help patient regain control  - Assess readiness for release of restraint  Outcome: Progressing  Goal: Returns to optimal restraint-free functioning  Description: INTERVENTIONS:  - Assess the patient's behavior and symptoms that indicate continued need for restraint  - Identify and implement measures to help patient regain control  - Assess readiness for release of restraint   Outcome: Progressing     Problem: DISCHARGE PLANNING  Goal: Discharge to home or other facility with appropriate resources  Description: INTERVENTIONS:  - Identify barriers to discharge w/patient and caregiver  - Arrange for needed discharge resources and transportation as appropriate  - Identify discharge learning needs (meds, wound care, etc )  - Arrange for interpretive services to assist at discharge as needed  - Refer to Case Management Department for coordinating discharge planning if the patient needs post-hospital services based on physician/advanced practitioner order or complex needs related to functional status, cognitive ability, or social support system  Outcome: Progressing     Problem: Ineffective Coping  Goal: Participates in unit activities  Description: Interventions:  - Provide therapeutic environment   - Provide required programming   - Redirect inappropriate behaviors   Outcome: Progressing

## 2022-01-17 NOTE — CASE MANAGEMENT
Using translation services with ID [id number];    CM called patient's mother Piter Wing at [phone]; phone was not answered, left a voicemail requesting call back for discharge planning  Call attempted to patient's father Polo Queen [phone]; provided status update and reviewed discharge plan; follow up appointment and MARIN follow up  Father requested pt's meds be sent to [facility]       Father will come to pick patient up tomorrow at 12 pm

## 2022-01-17 NOTE — DISCHARGE INSTR - PHARMACY
[facility]  [address],   [place], [address] [facility]  Phone: [phone]

## 2022-01-18 VITALS
WEIGHT: 190 LBS | HEIGHT: 71 IN | RESPIRATION RATE: 16 BRPM | BODY MASS INDEX: 26.6 KG/M2 | TEMPERATURE: 97.5 F | OXYGEN SATURATION: 98 % | DIASTOLIC BLOOD PRESSURE: 54 MMHG | HEART RATE: 74 BPM | SYSTOLIC BLOOD PRESSURE: 114 MMHG

## 2022-01-18 PROCEDURE — 99238 HOSP IP/OBS DSCHRG MGMT 30/<: CPT | Performed by: STUDENT IN AN ORGANIZED HEALTH CARE EDUCATION/TRAINING PROGRAM

## 2022-01-18 RX ADMIN — PALIPERIDONE PALMITATE 156 MG: 156 INJECTION INTRAMUSCULAR at 09:58

## 2022-01-18 NOTE — TREATMENT TEAM
01/18/22 0857   Team Meeting   Meeting Type Daily Rounds   Team Members Present   Team Members Present Physician;Nurse;   Physician Team Member Concetta   Nursing Team Member KHLOE MOFFETT [facility] Management Team Member Alejandra   Patient/Family Present   Patient Present No   Patient's Family Present No     Pt visible on the unit  Denies symptoms  D/c meds have been sent to [facility], with one refill   D/c home with father today at 15 PM

## 2022-01-18 NOTE — NURSING NOTE
Patient quiet calm and cooperative  Patient received snack  Patient had no medications, patient received no PRN's  Patient denies SI/HI/AVH

## 2022-01-18 NOTE — BH TRANSITION RECORD
Contact Information: If you have any questions, concerns, pended studies, tests and/or procedures, or emergencies regarding your inpatient behavioral health visit  Please contact [address] [place] [address] behavioral health unit 3B [phone]  and ask to speak to a , nurse or physician  A contact is available 24 hours/ 7 days a week at this number  Summary of Procedures Performed During your Stay:  Below is a list of major procedures performed during your hospital stay and a summary of results:  - Cardiac Procedures/Studies: EKG  Pending Studies (From admission, onward)    None        If studies are pending at discharge, follow up with your PCP and/or referring provider

## 2022-01-18 NOTE — NURSING NOTE
Patient was received awake, alert, dressed in gowns  Calm, pleasant, cooperative  Denies all distress, AH, VH, SI, HI, anxiety  Patient verbalized readiness for discharge, agreement with receiving Invega, using ICM, and using coping skills  He verbalized that if he begins to feel the way he felt before, he will ask for help from family or ICM  Follow-up visits reviewed with patient, patient educated re: functions of ICM, verbalized understanding of discharge materials  Invega sustenna 156mg given in L deltoid without issue  Belongings returned to patient; he self ambulated accompanied by staff to the main entrance where he was picked up by his father

## 2022-01-18 NOTE — PROGRESS NOTES
Progress Note - [address] [facility],4Th Floor 25 y o  male MRN: [id number]  Unit/Bed#: -01 Encounter: [id number]    Assessment/Plan   Principal Problem:    Schizophrenia ([address] )  Active Problems:    Tobacco abuse    Recommended Treatment:   Myrna Rear Sustenna 156 mg due tomorrow  Trazodone 50 mg HS for insomnia  Plan for discharge tomorrow  Encourage group therapy, milieu therapy and occupational therapy  Behavioral Health checks every 15 minutes  ----------------------------------------      Subjective:  Royal Rivera was seen this morning in the conference room  He states that he is doing well  Relates that his mood is improving  Reports he has not experienced any periods of anger or aggression, relates that he feels calmer  His speech is significantly more spontaneous compared to initial admission  States that he is tolerating the Invega well, did relate some injection site pain 2 days afterwards although relates that it is improving  He is pleasant, cheerful, and cooperative  Has been calling his brother and parents, relays he is looking forward to seeing them upon discharge  He is considering applying for new jobs after discharge though would like to take time to rest prior to this  States that sleep is stable  Appetite is well maintained  Denies any auditory or visual hallucinations  Does not appear to be responding to internal stimuli  Denies any obsessions or delusions at this time  Denies any suicidal or homicidal ideations      Behavior over the last 24 hours:  improved  Sleep: normal  Appetite: normal  Medication side effects: No  ROS: no complaints    Mental Status Evaluation:  Appearance:  casually dressed, dressed appropriately, adequate grooming, tattooed, wearing mask appropriately   Behavior:  pleasant, cooperative   Speech:  normal rate and volume   Mood:  "good"   Affect:  brighter   Thought Process:  logical, coherent, goal directed   Associations: intact associations Thought Content:  no overt delusions   Perceptual Disturbances: no auditory hallucinations, no visual hallucinations, does not appear responding to internal stimuli   Risk Potential: Suicidal ideation - None at present  Homicidal ideation - None at present  Potential for aggression - No   Sensorium:  oriented to person, place and time/date   Memory:  recent and remote memory grossly intact   Consciousness:  alert and awake   Attention/Concentration: attention span and concentration are age appropriate   Insight:  fair   Judgment: fair   Gait/Station: normal gait/station   Motor Activity: no abnormal movements     Medications: all current active meds have been reviewed and continue current psychiatric medications    Current Facility-Administered Medications   Medication Dose Route Frequency Provider Last Rate    acetaminophen  650 mg Oral Q6H PRN Kenya Pena MD      acetaminophen  650 mg Oral Q4H PRN Kenya Pena MD      acetaminophen  975 mg Oral Q6H PRN Kenya Pena MD      aluminum-magnesium hydroxide-simethicone  30 mL Oral Q4H PRN Kenya Pena MD      artificial tear  1 application Both Eyes P0O PRN Kenya Pena MD      haloperidol lactate  2 5 mg Intramuscular Q4H PRN Max 4/day Kenya Pena MD      And    LORazepam  1 mg Intramuscular Q4H PRN Max 4/day Kenya Pena MD      And    benztropine  0 5 mg Intramuscular Q4H PRN Max 4/day Kenya Pena MD      haloperidol lactate  5 mg Intramuscular Q4H PRN Max 4/day Kenya Pena MD      And    LORazepam  2 mg Intramuscular Q4H PRN Max 4/day Kenya Pena MD      And    benztropine  1 mg Intramuscular Q4H PRN Max 4/day Kenya Pena MD      benztropine  1 mg Oral Q4H PRN Max 6/day Kenya Pena MD      bisacodyl  10 mg Rectal Daily PRN Kenya Pena MD      hydrOXYzine HCL  50 mg Oral Q6H PRN Max 4/day Kenya Pena MD      Or    diphenhydrAMINE  50 mg Intramuscular Q6H PRN Brenda CONTRERAS Sharon Crane MD      haloperidol  1 mg Oral Q6H PRN Ammy Martinez MD      haloperidol  2 5 mg Oral Q4H PRN Max 4/day Ammy Martinez MD      haloperidol  5 mg Oral Q8H PRN Flokarrie Mandy, DO      haloperidol  5 mg Oral Q4H PRN Max 4/day Ammy Martinez MD      haloperidol lactate  5 mg Intramuscular Q6H PRN Alexus Canary Bendock, DO      hydrOXYzine HCL  100 mg Oral Q6H PRN Max 4/day Ammy Martinez MD      Or   [facility] LORazepam  2 mg Intramuscular Q6H PRN Ammy Martinez MD      hydrOXYzine HCL  25 mg Oral Q6H PRN Max 4/day Ammy Martinez MD      ibuprofen  400 mg Oral Q8H PRN Alexus Canary Bendock, DO      LORazepam  2 mg Intramuscular Q6H PRN Erin L Bendock, DO      LORazepam  2 mg Oral Q8H PRN Erin L Bendock, DO      melatonin  3 mg Oral HS PRN CHRISTINA Alamo      [START ON 1/18/2022] paliperidone palmitate ER  156 mg Intramuscular Once CHRISTINA Alamo      polyethylene glycol  17 g Oral Daily PRN Ammy Martinez MD      senna-docusate sodium  1 tablet Oral Daily PRN Ammy Martinez MD      traZODone  50 mg Oral HS CHRISTINA Alamo         Labs:  I have personally reviewed all pertinent laboratory/tests results  Most Recent Labs:   Lab Results   Component Value Date    WBC 7 80 01/07/2022    RBC 5 38 01/07/2022    HGB 15 7 01/07/2022    HCT 47 7 01/07/2022     01/07/2022    RDW 13 3 01/07/2022    NEUTROABS 4 10 01/07/2022    SODIUM 139 01/07/2022    K 4 1 01/07/2022     01/07/2022    CO2 25 01/07/2022    BUN 10 01/07/2022    CREATININE 0 66 (L) 01/07/2022    GLUC 131 (H) 01/07/2022    CALCIUM 9 1 01/07/2022    AST 29 01/07/2022    ALT 22 01/07/2022    ALKPHOS 57 01/07/2022    TP 8 0 01/07/2022    ALB 4 5 01/07/2022    TBILI 0 63 01/07/2022    CHOLESTEROL 123 01/07/2022    HDL 21 (L) 01/07/2022    TRIG 92 01/07/2022    LDLCALC 84 01/07/2022    NONHDLC 102 01/07/2022    LYN7NOKSPZNI 1 060 01/07/2022    RPR Non-Reactive 01/07/2022       Progress Toward Goals: progressing    Risks / Benefits of Treatment:    Risks, benefits, and possible side effects of medications explained to patient and patient verbalizes understanding and agreement for treatment  Counseling / Coordination of Care:    Patient's progress discussed with staff in treatment team meeting  Medications, treatment progress and treatment plan reviewed with patient      Lucas Torres MD 01/17/22

## 2022-01-18 NOTE — DISCHARGE SUMMARY
Discharge Summary - [address] [facility],4[address] 25 y o  male MRN: [id number]  Unit/Bed#: Valentino Martinez Encounter: [id number]     Admission Date:   Admission Orders (From admission, onward)     Ordered        01/06/22 1711  ED TO SAME [facility] UNIT (using Admission Navigator) - Admit Patient to [address] [place] [address]  Once                            Discharge Date: 1/18/2022 12:00 PM    Attending Psychiatrist: Bernadette Kenney MD    Reason for Admission/HPI:   History of Present Illness     Brittany Mitchmercy was seen alongside Dr Elizabeth Crespo  He is a 61-year-old male, history of substance induced mood disorder, that was admitted to the [address] [place] [address] Unit due to increased aggression, agitation, paranoia, Faith preoccupation, and auditory hallucinations  He also had made statements about jumping out of a window  He was brought to the ED at behest of his parents, of whom initially planned a petition a 36, although patient decided to sign in on a voluntary 201 admission  While in the emergency room he was very aggressive and agitated, required both physical and chemical restraints  He appears calmer during time of our assessment  He does admit to some of the claims as mentioned above; reports he does have periods of it irritability and mood swings  States that he can get loud and yell although relays he is not physically aggressive  Reports that over the past 3 days he has had disturbed sleep  He also reports that he began experiencing auditory hallucinations and felt that he could hear unspecified voices that were not command saying negative and derogatory things about him and his family  There were also some components of paranoia as mentioned feeling that his brother is "hiding something from him" and he feels that his brother should quit his job  He was vague and evasive in response to this and did not elaborate in any great detail    He became tearful during discussion of this, stated that he was appreciative of the treatment team and just wanted to get help  Currently, he denies any suicidal or homicidal ideations  He denied any auditory or visual hallucinations at this time  No episodes of lorena or hypomania were elicited  He denied symptoms of depression and anxiety currently  Hospital Course:   Samina Reyes was admitted to the inpatient psychiatric unit and was monitored closely  During the hospitalization he was attending individual therapy, group therapy, milieu therapy and occupational therapy  He was started on risperidone and this was gradually titrated to 4 mg daily  Risperidone was discontinued and Invega was started  He received the 234mg and 156 booster injection  Throughout the time course of his admission, his symptoms gradually resolved  He became more pleasant and cooperative  There is less internal preoccupation  Auditory and visual hallucinations subsided  Speech was more spontaneous  There is no presence of paranoia  He was future oriented and hopeful on day of discharge  Plan to spend time with family and intended on finding a new job post discharge  Samina Reyes agreed to continue medications and to follow-up with outpatient appointments       Mental Status at time of Discharge:   Appearance:  casually dressed, adequate grooming   Behavior:  pleasant, cooperative, calm   Speech:  normal rate and volume   Mood:  improved   Affect:  normal range and intensity   Thought Process:  coherent, goal directed   Associations: intact associations   Thought Content:  no overt delusions   Perceptual Disturbances: no auditory hallucinations, no visual hallucinations   Risk Potential: Suicidal ideation - None at present  Homicidal ideation - None at present  Potential for aggression - No   Sensorium:  oriented to person, place and time/date   Memory:  recent and remote memory grossly intact   Consciousness:  alert and awake   Attention/Concentration: attention span and concentration are age appropriate   Insight:  improving   Judgment: improving   Gait/Station: normal gait/station   Motor Activity: no abnormal movements       Admission Diagnosis:    Principal Problem:    Schizophrenia ([place] [address] )  Active Problems:    Tobacco abuse      Discharge Diagnosis:     Principal Problem:    Schizophrenia ([place] [address] )  Active Problems:    Tobacco abuse  Resolved Problems:    Medical clearance for psychiatric admission          Discharge Medications:  See after visit summary for reconciled discharge medications provided to patient and family  Discharge instructions/Information to patient and family:   See after visit summary for information provided to patient and family  Provisions for Follow-Up Care:  See after visit summary for information related to follow-up care and any pertinent home health orders  Discharge on Two Antipsychotic Medications: No    Discharge Statement   I spent 25 minutes discharging the patient  This time was spent on the day of discharge  I had direct contact with the patient on the day of discharge  Additional documentation is required if more than 30 minutes were spent on discharge

## 2022-01-18 NOTE — CASE MANAGEMENT
Pt's MARIN prescription has been faxed to [facility]  CM educated patient to follow up with pharmacy if he does not get a call from them

## 2022-01-18 NOTE — CASE MANAGEMENT
Met with patient for discharge planning; reviewed and discussed effective use of learned Coping skills, support system such as [address], Calling 911 or going to nearest emergency for true emergency  Pt's d/c med have been ePrescribed to [address] [facility] [address]  Pt has one refill on d/c meds  Reviewed patient's follow up appointments at [facility] for thearpy and med check  Follow up for [address] with E[address]  Pt verbalized understanding of the plan  Pt denied SI, HI and AVH  D/c home with father today at 15 Pm

## 2022-02-14 ENCOUNTER — HOSPITAL ENCOUNTER (EMERGENCY)
Facility: HOSPITAL | Age: 22
Discharge: HOME | End: 2022-02-14
Attending: EMERGENCY MEDICINE | Admitting: EMERGENCY MEDICINE
Payer: MEDICARE

## 2022-02-14 VITALS
OXYGEN SATURATION: 100 % | RESPIRATION RATE: 18 BRPM | BODY MASS INDEX: 28.07 KG/M2 | SYSTOLIC BLOOD PRESSURE: 144 MMHG | HEART RATE: 74 BPM | DIASTOLIC BLOOD PRESSURE: 55 MMHG | WEIGHT: 201.28 LBS | TEMPERATURE: 97.5 F

## 2022-02-14 DIAGNOSIS — Z76.89 ENCOUNTER FOR MEDICATION ADMINISTRATION: Primary | ICD-10-CM

## 2022-02-14 PROCEDURE — 99281 EMR DPT VST MAYX REQ PHY/QHP: CPT | Performed by: EMERGENCY MEDICINE

## 2022-02-14 PROCEDURE — 99283 EMERGENCY DEPT VISIT LOW MDM: CPT
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
## 2022-02-14 NOTE — ED NOTES
Patient walked through process and gave self injection  Voiced understanding of procedure       Dennise Schultz RN  02/14/22 4514

## 2022-03-07 DIAGNOSIS — F20.9 SCHIZOPHRENIA (HCC): ICD-10-CM

## 2022-03-08 RX ORDER — PALIPERIDONE PALMITATE 156 MG/ML
INJECTION INTRAMUSCULAR
Qty: 1 ML | Refills: 0 | Status: SHIPPED | OUTPATIENT
Start: 2022-03-08

## 2023-01-01 NOTE — PROGRESS NOTES
01/13/21 1100   Activity/Group Checklist   Group   (recovery group)   Attendance Attended   Attendance Duration (min) 46-60   Interactions Interacted appropriately   Affect/Mood Appropriate   Goals Achieved Able to listen to others; Able to engage in interactions yes

## 2023-04-03 ENCOUNTER — HOSPITAL ENCOUNTER (EMERGENCY)
Facility: HOSPITAL | Age: 23
Discharge: HOME | End: 2023-04-03
Attending: EMERGENCY MEDICINE

## 2023-04-03 VITALS
OXYGEN SATURATION: 99 % | RESPIRATION RATE: 18 BRPM | DIASTOLIC BLOOD PRESSURE: 76 MMHG | HEART RATE: 86 BPM | SYSTOLIC BLOOD PRESSURE: 145 MMHG | TEMPERATURE: 98 F

## 2023-04-03 DIAGNOSIS — R45.1 AGITATION: Primary | ICD-10-CM

## 2023-04-03 LAB
AMPHETAMINES SERPL QL SCN: NEGATIVE
BARBITURATES UR QL: NEGATIVE
BENZODIAZ UR QL: NEGATIVE
COCAINE UR QL: NEGATIVE
ETHANOL EXG-MCNC: 0 MG/DL
METHADONE UR QL: NEGATIVE
OPIATES UR QL SCN: NEGATIVE
OXYCODONE+OXYMORPHONE UR QL SCN: NEGATIVE
PCP UR QL: NEGATIVE
THC UR QL: POSITIVE
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
## 2023-04-04 NOTE — ED NOTES
Patient nose ring will not come out  Primary RN and provider aware        Lashawn García RN  04/03/23 6095

## 2023-04-11 PROBLEM — F25.9 SCHIZOAFFECTIVE DISORDER (HCC): Status: ACTIVE | Noted: 2022-01-06

## 2023-06-20 ENCOUNTER — HOSPITAL ENCOUNTER (EMERGENCY)
Facility: HOSPITAL | Age: 23
End: 2023-06-21
Attending: EMERGENCY MEDICINE
Payer: COMMERCIAL

## 2023-06-20 DIAGNOSIS — F25.9 SCHIZOAFFECTIVE DISORDER, UNSPECIFIED TYPE (HCC): ICD-10-CM

## 2023-06-20 DIAGNOSIS — R44.3 HALLUCINATIONS: Primary | ICD-10-CM

## 2023-06-20 LAB — ETHANOL EXG-MCNC: 0 MG/DL

## 2023-06-20 PROCEDURE — 80307 DRUG TEST PRSMV CHEM ANLYZR: CPT | Performed by: EMERGENCY MEDICINE

## 2023-06-20 PROCEDURE — 82075 ASSAY OF BREATH ETHANOL: CPT | Performed by: EMERGENCY MEDICINE

## 2023-06-20 RX ORDER — FLUOXETINE HYDROCHLORIDE 20 MG/1
20 CAPSULE ORAL DAILY
Status: DISCONTINUED | OUTPATIENT
Start: 2023-06-21 | End: 2023-06-21 | Stop reason: HOSPADM

## 2023-06-20 RX ORDER — RISPERIDONE 1 MG/1
2 TABLET ORAL
Status: DISCONTINUED | OUTPATIENT
Start: 2023-06-20 | End: 2023-06-21 | Stop reason: HOSPADM

## 2023-06-20 RX ORDER — BENZTROPINE MESYLATE 0.5 MG/1
0.5 TABLET ORAL
Status: DISCONTINUED | OUTPATIENT
Start: 2023-06-20 | End: 2023-06-21 | Stop reason: HOSPADM

## 2023-06-20 RX ADMIN — RISPERIDONE 2 MG: 1 TABLET ORAL at 23:42

## 2023-06-20 RX ADMIN — BENZTROPINE MESYLATE 0.5 MG: 0.5 TABLET ORAL at 23:42

## 2023-06-21 ENCOUNTER — HOSPITAL ENCOUNTER (INPATIENT)
Facility: HOSPITAL | Age: 23
LOS: 9 days | Discharge: HOME | DRG: 751 | End: 2023-06-30
Attending: STUDENT IN AN ORGANIZED HEALTH CARE EDUCATION/TRAINING PROGRAM | Admitting: PSYCHIATRY & NEUROLOGY
Payer: COMMERCIAL

## 2023-06-21 VITALS
RESPIRATION RATE: 16 BRPM | BODY MASS INDEX: 29.1 KG/M2 | TEMPERATURE: 97.8 F | OXYGEN SATURATION: 98 % | HEART RATE: 90 BPM | WEIGHT: 232.81 LBS | SYSTOLIC BLOOD PRESSURE: 106 MMHG | DIASTOLIC BLOOD PRESSURE: 63 MMHG

## 2023-06-21 DIAGNOSIS — Z72.0 TOBACCO ABUSE: ICD-10-CM

## 2023-06-21 DIAGNOSIS — F25.9 SCHIZOAFFECTIVE DISORDER, UNSPECIFIED TYPE (HCC): ICD-10-CM

## 2023-06-21 DIAGNOSIS — F25.9 SCHIZOAFFECTIVE DISORDER (HCC): Primary | ICD-10-CM

## 2023-06-21 DIAGNOSIS — Z78.9 ALCOHOL USE: ICD-10-CM

## 2023-06-21 LAB
AMPHETAMINES SERPL QL SCN: NEGATIVE
BARBITURATES UR QL: NEGATIVE
BENZODIAZ UR QL: NEGATIVE
COCAINE UR QL: NEGATIVE
METHADONE UR QL: NEGATIVE
OPIATES UR QL SCN: NEGATIVE
OXYCODONE+OXYMORPHONE UR QL SCN: NEGATIVE
PCP UR QL: NEGATIVE
THC UR QL: POSITIVE

## 2023-06-21 RX ORDER — ACETAMINOPHEN 325 MG/1
650 TABLET ORAL EVERY 6 HOURS PRN
Status: CANCELLED | OUTPATIENT
Start: 2023-06-21

## 2023-06-21 RX ORDER — AMOXICILLIN 250 MG
1 CAPSULE ORAL DAILY PRN
Status: DISCONTINUED | OUTPATIENT
Start: 2023-06-21 | End: 2023-06-30 | Stop reason: HOSPADM

## 2023-06-21 RX ORDER — POLYETHYLENE GLYCOL 3350 17 G/17G
17 POWDER, FOR SOLUTION ORAL DAILY PRN
Status: DISCONTINUED | OUTPATIENT
Start: 2023-06-21 | End: 2023-06-22

## 2023-06-21 RX ORDER — HALOPERIDOL 5 MG/1
2.5 TABLET ORAL
Status: DISCONTINUED | OUTPATIENT
Start: 2023-06-21 | End: 2023-06-30 | Stop reason: HOSPADM

## 2023-06-21 RX ORDER — DIPHENHYDRAMINE HYDROCHLORIDE 50 MG/ML
50 INJECTION INTRAMUSCULAR; INTRAVENOUS EVERY 6 HOURS PRN
Status: CANCELLED | OUTPATIENT
Start: 2023-06-21

## 2023-06-21 RX ORDER — HALOPERIDOL 5 MG/1
5 TABLET ORAL
Status: DISCONTINUED | OUTPATIENT
Start: 2023-06-21 | End: 2023-06-30 | Stop reason: HOSPADM

## 2023-06-21 RX ORDER — BISACODYL 10 MG
10 SUPPOSITORY, RECTAL RECTAL DAILY PRN
Status: DISCONTINUED | OUTPATIENT
Start: 2023-06-21 | End: 2023-06-22

## 2023-06-21 RX ORDER — HALOPERIDOL 5 MG/ML
5 INJECTION INTRAMUSCULAR
Status: DISCONTINUED | OUTPATIENT
Start: 2023-06-21 | End: 2023-06-30 | Stop reason: HOSPADM

## 2023-06-21 RX ORDER — HYDROXYZINE 50 MG/1
100 TABLET, FILM COATED ORAL
Status: DISCONTINUED | OUTPATIENT
Start: 2023-06-21 | End: 2023-06-30 | Stop reason: HOSPADM

## 2023-06-21 RX ORDER — LORAZEPAM 2 MG/ML
2 INJECTION INTRAMUSCULAR
Status: CANCELLED | OUTPATIENT
Start: 2023-06-21

## 2023-06-21 RX ORDER — HYDROXYZINE 50 MG/1
50 TABLET, FILM COATED ORAL
Status: CANCELLED | OUTPATIENT
Start: 2023-06-21

## 2023-06-21 RX ORDER — HALOPERIDOL 5 MG/ML
5 INJECTION INTRAMUSCULAR
Status: CANCELLED | OUTPATIENT
Start: 2023-06-21

## 2023-06-21 RX ORDER — LORAZEPAM 2 MG/ML
1 INJECTION INTRAMUSCULAR
Status: DISCONTINUED | OUTPATIENT
Start: 2023-06-21 | End: 2023-06-30 | Stop reason: HOSPADM

## 2023-06-21 RX ORDER — BENZTROPINE MESYLATE 1 MG/ML
0.5 INJECTION INTRAMUSCULAR; INTRAVENOUS
Status: CANCELLED | OUTPATIENT
Start: 2023-06-21

## 2023-06-21 RX ORDER — LORAZEPAM 2 MG/ML
2 INJECTION INTRAMUSCULAR EVERY 6 HOURS PRN
Status: CANCELLED | OUTPATIENT
Start: 2023-06-21

## 2023-06-21 RX ORDER — HYDROXYZINE 50 MG/1
100 TABLET, FILM COATED ORAL
Status: CANCELLED | OUTPATIENT
Start: 2023-06-21

## 2023-06-21 RX ORDER — AMOXICILLIN 250 MG
1 CAPSULE ORAL DAILY PRN
Status: CANCELLED | OUTPATIENT
Start: 2023-06-21

## 2023-06-21 RX ORDER — HYDROXYZINE 50 MG/1
50 TABLET, FILM COATED ORAL
Status: DISCONTINUED | OUTPATIENT
Start: 2023-06-21 | End: 2023-06-30 | Stop reason: HOSPADM

## 2023-06-21 RX ORDER — HALOPERIDOL 1 MG/1
1 TABLET ORAL EVERY 6 HOURS PRN
Status: DISCONTINUED | OUTPATIENT
Start: 2023-06-21 | End: 2023-06-30 | Stop reason: HOSPADM

## 2023-06-21 RX ORDER — HYDROXYZINE HYDROCHLORIDE 25 MG/1
25 TABLET, FILM COATED ORAL
Status: CANCELLED | OUTPATIENT
Start: 2023-06-21

## 2023-06-21 RX ORDER — BENZTROPINE MESYLATE 1 MG/ML
1 INJECTION INTRAMUSCULAR; INTRAVENOUS
Status: DISCONTINUED | OUTPATIENT
Start: 2023-06-21 | End: 2023-06-30 | Stop reason: HOSPADM

## 2023-06-21 RX ORDER — POLYETHYLENE GLYCOL 3350 17 G/17G
17 POWDER, FOR SOLUTION ORAL DAILY PRN
Status: CANCELLED | OUTPATIENT
Start: 2023-06-21

## 2023-06-21 RX ORDER — HYDROXYZINE HYDROCHLORIDE 25 MG/1
25 TABLET, FILM COATED ORAL
Status: DISCONTINUED | OUTPATIENT
Start: 2023-06-21 | End: 2023-06-30 | Stop reason: HOSPADM

## 2023-06-21 RX ORDER — HALOPERIDOL 5 MG/1
5 TABLET ORAL
Status: CANCELLED | OUTPATIENT
Start: 2023-06-21

## 2023-06-21 RX ORDER — LORAZEPAM 1 MG/1
2 TABLET ORAL EVERY 4 HOURS PRN
Status: DISCONTINUED | OUTPATIENT
Start: 2023-06-21 | End: 2023-06-30 | Stop reason: HOSPADM

## 2023-06-21 RX ORDER — ACETAMINOPHEN 325 MG/1
650 TABLET ORAL EVERY 4 HOURS PRN
Status: DISCONTINUED | OUTPATIENT
Start: 2023-06-21 | End: 2023-06-30 | Stop reason: HOSPADM

## 2023-06-21 RX ORDER — LORAZEPAM 2 MG/ML
2 INJECTION INTRAMUSCULAR EVERY 6 HOURS PRN
Status: DISCONTINUED | OUTPATIENT
Start: 2023-06-21 | End: 2023-06-30 | Stop reason: HOSPADM

## 2023-06-21 RX ORDER — LANOLIN ALCOHOL/MO/W.PET/CERES
3 CREAM (GRAM) TOPICAL
Status: CANCELLED | OUTPATIENT
Start: 2023-06-21

## 2023-06-21 RX ORDER — BENZTROPINE MESYLATE 1 MG/1
1 TABLET ORAL
Status: CANCELLED | OUTPATIENT
Start: 2023-06-21

## 2023-06-21 RX ORDER — BENZTROPINE MESYLATE 1 MG/ML
1 INJECTION INTRAMUSCULAR; INTRAVENOUS
Status: CANCELLED | OUTPATIENT
Start: 2023-06-21

## 2023-06-21 RX ORDER — BENZTROPINE MESYLATE 1 MG/ML
0.5 INJECTION INTRAMUSCULAR; INTRAVENOUS
Status: DISCONTINUED | OUTPATIENT
Start: 2023-06-21 | End: 2023-06-30 | Stop reason: HOSPADM

## 2023-06-21 RX ORDER — LORAZEPAM 2 MG/ML
2 INJECTION INTRAMUSCULAR
Status: DISCONTINUED | OUTPATIENT
Start: 2023-06-21 | End: 2023-06-30 | Stop reason: HOSPADM

## 2023-06-21 RX ORDER — ACETAMINOPHEN 325 MG/1
975 TABLET ORAL EVERY 6 HOURS PRN
Status: DISCONTINUED | OUTPATIENT
Start: 2023-06-21 | End: 2023-06-30 | Stop reason: HOSPADM

## 2023-06-21 RX ORDER — MAGNESIUM HYDROXIDE/ALUMINUM HYDROXICE/SIMETHICONE 120; 1200; 1200 MG/30ML; MG/30ML; MG/30ML
30 SUSPENSION ORAL EVERY 4 HOURS PRN
Status: DISCONTINUED | OUTPATIENT
Start: 2023-06-21 | End: 2023-06-30 | Stop reason: HOSPADM

## 2023-06-21 RX ORDER — LANOLIN ALCOHOL/MO/W.PET/CERES
100 CREAM (GRAM) TOPICAL DAILY
Status: DISCONTINUED | OUTPATIENT
Start: 2023-06-21 | End: 2023-06-30 | Stop reason: HOSPADM

## 2023-06-21 RX ORDER — TRAZODONE HYDROCHLORIDE 50 MG/1
50 TABLET ORAL
Status: CANCELLED | OUTPATIENT
Start: 2023-06-21

## 2023-06-21 RX ORDER — BISACODYL 10 MG
10 SUPPOSITORY, RECTAL RECTAL DAILY PRN
Status: CANCELLED | OUTPATIENT
Start: 2023-06-21

## 2023-06-21 RX ORDER — DIPHENHYDRAMINE HYDROCHLORIDE 50 MG/ML
50 INJECTION INTRAMUSCULAR; INTRAVENOUS EVERY 6 HOURS PRN
Status: DISCONTINUED | OUTPATIENT
Start: 2023-06-21 | End: 2023-06-30 | Stop reason: HOSPADM

## 2023-06-21 RX ORDER — HALOPERIDOL 5 MG/ML
2.5 INJECTION INTRAMUSCULAR
Status: CANCELLED | OUTPATIENT
Start: 2023-06-21

## 2023-06-21 RX ORDER — LANOLIN ALCOHOL/MO/W.PET/CERES
3 CREAM (GRAM) TOPICAL
Status: DISCONTINUED | OUTPATIENT
Start: 2023-06-21 | End: 2023-06-22

## 2023-06-21 RX ORDER — FOLIC ACID 1 MG/1
1 TABLET ORAL DAILY
Status: DISCONTINUED | OUTPATIENT
Start: 2023-06-21 | End: 2023-06-30 | Stop reason: HOSPADM

## 2023-06-21 RX ORDER — HALOPERIDOL 0.5 MG/1
1 TABLET ORAL EVERY 6 HOURS PRN
Status: CANCELLED | OUTPATIENT
Start: 2023-06-21

## 2023-06-21 RX ORDER — ACETAMINOPHEN 325 MG/1
650 TABLET ORAL EVERY 6 HOURS PRN
Status: DISCONTINUED | OUTPATIENT
Start: 2023-06-21 | End: 2023-06-30 | Stop reason: HOSPADM

## 2023-06-21 RX ORDER — ACETAMINOPHEN 325 MG/1
975 TABLET ORAL EVERY 6 HOURS PRN
Status: CANCELLED | OUTPATIENT
Start: 2023-06-21

## 2023-06-21 RX ORDER — LORAZEPAM 2 MG/ML
1 INJECTION INTRAMUSCULAR
Status: CANCELLED | OUTPATIENT
Start: 2023-06-21

## 2023-06-21 RX ORDER — HALOPERIDOL 5 MG/1
2.5 TABLET ORAL
Status: CANCELLED | OUTPATIENT
Start: 2023-06-21

## 2023-06-21 RX ORDER — MAGNESIUM HYDROXIDE/ALUMINUM HYDROXICE/SIMETHICONE 120; 1200; 1200 MG/30ML; MG/30ML; MG/30ML
30 SUSPENSION ORAL EVERY 4 HOURS PRN
Status: CANCELLED | OUTPATIENT
Start: 2023-06-21

## 2023-06-21 RX ORDER — ACETAMINOPHEN 325 MG/1
650 TABLET ORAL EVERY 4 HOURS PRN
Status: CANCELLED | OUTPATIENT
Start: 2023-06-21

## 2023-06-21 RX ORDER — TRAZODONE HYDROCHLORIDE 50 MG/1
50 TABLET ORAL
Status: DISCONTINUED | OUTPATIENT
Start: 2023-06-21 | End: 2023-06-30 | Stop reason: HOSPADM

## 2023-06-21 RX ORDER — HALOPERIDOL 5 MG/ML
2.5 INJECTION INTRAMUSCULAR
Status: DISCONTINUED | OUTPATIENT
Start: 2023-06-21 | End: 2023-06-30 | Stop reason: HOSPADM

## 2023-06-21 RX ORDER — BENZTROPINE MESYLATE 1 MG/1
1 TABLET ORAL
Status: DISCONTINUED | OUTPATIENT
Start: 2023-06-21 | End: 2023-06-30 | Stop reason: HOSPADM

## 2023-06-21 RX ADMIN — NICOTINE POLACRILEX 4 MG: 4 GUM, CHEWING BUCCAL at 16:17

## 2023-06-21 RX ADMIN — FOLIC ACID 1 MG: 1 TABLET ORAL at 17:00

## 2023-06-21 RX ADMIN — Medication 1 TABLET: at 17:00

## 2023-06-21 RX ADMIN — FLUOXETINE 20 MG: 20 CAPSULE ORAL at 08:13

## 2023-06-21 RX ADMIN — THIAMINE HCL TAB 100 MG 100 MG: 100 TAB at 17:00

## 2023-06-21 RX ADMIN — MELATONIN 3 MG: at 21:30

## 2023-06-21 NOTE — NURSING NOTE
Pt is a 14 from [facility] ED, reportedly from ED, pt arrived after being non-compliant with medications and outpatient treatment following hospitalization several months ago. Pt lives at home with family and has been observed responding to internal stimuli more frequently and labile. Having ruminating thoughts, poor ADL's, poor sleep at night however does tend to sleep during the day. Per chart, pt received Invega IM last on 2/14. On arrival to this unit, pt denies SI/HI, AVH. Pt is delayed and preoccupied throughout conversation. States "I just didn't want to live in [place] anymore so brought myself to the hospital to find me a new area to check out and now I'm here." pt states he stopped medications due to "smoking and drinking too much." pt states smoking marijuana 2-3 times a week and drinking alcohol about 5 times a week, up to 20 beers and mixed drinks a day. Last drink 6/19. Pt currently denies any w/d s/s. Pt smokes cigarettes/hookah a few times a week. Pt remains calm and cooperative throughout conversation. States "pretty sure" wanting to start medications here. Pt states he does get frustrated that he cannot sleep at night so would like to make this a goal while in the hospital. Reviewed unit rules/routine with pt.

## 2023-06-21 NOTE — NURSING NOTE
New admission contraband and skin assessment completed. No open wounds. Changed into hospital attire while clothing is being washed. Cooperative during interaction. States he is currently residing with mom in [facility].

## 2023-06-21 NOTE — ED NOTES
Given turkey sandwich snacks and drinks    Patient offered toileting     Kye Morrison RN  06/21/23 2756

## 2023-06-21 NOTE — ED NOTES
Brothers brought the patient in with concerns that he is responding, ruminating and appears angry because he has not slept in a while  Pt admits to not having not picked up his discharged medications, nor gone to the ORAL appt  Pt feels the medications and appt were not needed  He feels he can do all this on his own, but his family is willing to help him with anything he needs and keep up with his diagnosis      Brothers:  Giovanna Morales [phone]  Miguel Craft RN  06/20/23 6987

## 2023-06-21 NOTE — ED NOTES
Insurance Authorization for admission:   Phone call placed to [facility]  Phone number: [phone]  Spoke to [facility]      5 days approved  Level of care: IP  Review on 6/26     Authorization # To be issued upon arrival

## 2023-06-21 NOTE — ED NOTES
[place]  Recipient # [id number]   [facility] managed by [facility]    Patient's insurance plan under [facility] listed as inactive since last year per Pema MARTINZE AND [facility] )

## 2023-06-21 NOTE — ED NOTES
Pt presented to the ED with his brother and cousin from home due to family concerns of Pt decline  Per family, Pt has not been compliant with his outpatient follow up or taken any medications since he was discharged in April  Pt appears to lack insight to their concerns, but is cooperative and willing to stay for treatment  He denies SI/HI, and family does not report that any threats were made  Pt has been responding to internal stimuli at home, which he denies currently having  he does report he is awake all night and sleeps on/off throughout the day  No changes to appetite noted  Family told ED staff that he has reported ruminating thoughts, and gets angry quickly  No physical aggression reported  Pt denies legal involvement or access to firearms  Pt reports that he smokes hookah and drinks alcohol socially, but denies use today  Family reports that Pt has been talking to the walls at home and is not completing ADLs  Per chart review, Pt did last receive his Invega injection on 2/14, but did not follow up in March to continue  Per family they were concerned that Pt would continue to escalate and be unsafe at home  Pt is willing to sign a 201 for admisison  ED attending is in agreement

## 2023-06-21 NOTE — ED NOTES
Pt currently being reviewed at [facility]  He is in agreement with bed search outside of [facility] due to limited bed availability  Crisis to follow up

## 2023-06-21 NOTE — ED CARE HANDOFF
Emergency Department Sign Out Note        Sign out and transfer of care from Dr Addison Fleming  See Separate Emergency Department note  The patient, Jesusita Calhoun, was evaluated by the previous provider for psych  Workup Completed:  See previous ED notes    ED Course / Workup Pending (followup):                                  ED Course as of 06/21/23 1412   Wed Jun 21, 2023   0710 Sign out: 23yoM presenting for hallucinations  Signed 201 pending placement  Continue safety plan   1108 Patient accepted to [facility] with transportation time pending  Procedures  MDM        Disposition  Final diagnoses:   Schizoaffective disorder, unspecified type ([facility] )   Hallucinations     Time reflects when diagnosis was documented in both MDM as applicable and the Disposition within this note     Time User Action Codes Description Comment    6/20/2023 11:18 PM Addison Im, [address] [F25 9] Schizoaffective disorder, unspecified type ([facility] )     6/20/2023 11:18 PM Vertie Im, [address] [R44 3] Hallucinations     6/20/2023 11:18 PM Elenor Griffiths Modify [F25 9] Schizoaffective disorder, unspecified type ([facility] )     6/20/2023 11:18 PM Elenor Griffiths Modify [R44 3] Hallucinations       ED Disposition     ED Disposition   Transfer to [address] [place] [address]   --    Date/Time   Tue Jun 20, 2023 11:18 PM    Comment   Jesusita Calhoun should be transferred out to [facility] and has been medically cleared             MD Documentation    Ryann Gold Most Recent Value   Patient Condition The patient has been stabilized such that within reasonable medical probability, no material deterioration of the patient condition or the condition of the unborn child(jzalyn) is likely to result from the transfer   Reason for Transfer No bed available at level of patient's needs   Benefits of Transfer Continuity of care   Risks of Transfer Potential for delay in receiving treatment   Accepting Physician Ruben Perez MD   Accepting Facility Name, [facility] [address], [facility] [address]    (Name & Tel number) [address]/ [phone]   Transported by [employer] and Unit #) [phone]  Sending MD Tarsha Byrd MD   Provider Certification General risk, such as traffic hazards, adverse weather conditions, rough terrain or turbulence, possible failure of equipment (including vehicle or aircraft), or consequences of actions of persons outside the control of the transport personnel      RN Documentation    Flowsheet Row Most [facility] Name, [address] [facility] , [facility], [address] [address]    (Name & Tel number) [address]/ [phone] Other (Comment)   Transported by [employer] and Unit #) [phone]  Level of Care Other (Comment)   Patient Belongings Disposition Sent with patient   Transfer Date 06/21/23   Transfer Time 5224      Follow-up Information    None       Patient's Medications   Discharge Prescriptions    No medications on file     No discharge procedures on file         ED Provider  Electronically Signed by     Kimberly Strickland MD  06/21/23 8796

## 2023-06-21 NOTE — ED NOTES
Pt informed crisis worker he has a headache from not eating  Dahlen and chips provided  No other complaints at this time

## 2023-06-21 NOTE — PLAN OF CARE
RN will meet with pt at least twice per day to assess for any concerns. Teach about prescribed medications and diagnosis. Pt will be taught and encouraged to utilize healthy coping skills.

## 2023-06-21 NOTE — ED NOTES
Patient is accepted at [facility]  Patient is accepted by Manfred Escobedo MD     Patient may go to the floor at 1200  Transportation is arranged with [facility]  Transportation is scheduled for 1400 per [employer]  Erin from Intake is aware  Rachna, patient's attending nurse is aware  Patient is aware  Nurse report is to be called to [phone] prior to patient transfer  Per patient's 6/20 nurse, the family stressed the importance of involving them in coordinating discharge plans, as they would like to facilitate outpatient compliance  Upon previous discharge, the patient did not  his medication and did not attend his outpatient appointment
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
## 2023-06-21 NOTE — PLAN OF CARE
Problem: PSYCHOSIS  Goal: Will report no hallucinations or delusions  Description: Interventions:  - Administer medication as  ordered  - Every waking shifts and PRN assess for the presence of hallucinations and or delusions  - Assist with reality testing to support increasing orientation  - Assess if patient's hallucinations or delusions are encouraging self-harm or harm to others and intervene as appropriate  Outcome: Progressing     Problem: SUBSTANCE USE/ABUSE  Goal: Will have no detox symptoms and will verbalize plan for changing substance-related behavior  Description: INTERVENTIONS:  - Monitor physical status and assess for symptoms of withdrawal  - Administer medication as ordered  - Provide emotional support with 1 on 1 interaction with staff  - Encourage recovery focused program/ addiction education  - Assess for verbalization of changing behaviors related to substance abuse  - Initiate consults and referrals as appropriate (Case Management, Spiritual Care, etc.)  Outcome: Progressing  Goal: By discharge, will develop insight into their chemical dependency and sustain motivation to continue in recovery  Description: INTERVENTIONS:  - Attends all daily group sessions and scheduled AA groups  - Actively practices coping skills through participation in the therapeutic community and adherence to program rules  - Reviews and completes assignments from individual treatment plan  - Assist patient development of understanding of their personal cycle of addiction and relapse triggers  Outcome: Progressing  Goal: By discharge, patient will have ongoing treatment plan addressing chemical dependency  Description: INTERVENTIONS:  - Assist patient with resources and/or appointments for ongoing recovery based living  Outcome: Progressing     Problem: SLEEP DISTURBANCE  Goal: Will exhibit normal sleeping pattern  Description: Interventions:  -  Assess the patients sleep pattern, noting recent changes  - Administer medication as ordered  - Decrease environmental stimuli, including noise, as appropriate during the night  - Encourage the patient to actively participate in unit groups and or exercise during the day to enhance ability to achieve adequate sleep at night  - Assess the patient, in the morning, encouraging a description of sleep experience  Outcome: Progressing

## 2023-06-21 NOTE — EMTALA/ACUTE CARE TRANSFER
[facility]  [address] [facility] [address]  [phone]  Dept: [phone]      EMTALA TRANSFER CONSENT    NAME Chaitanya Castro                                         2000                              MRN [id number]    I have been informed of my rights regarding examination, treatment, and transfer   by Dr Myrtle Madrid: Continuity of care    Risks: Potential for delay in receiving treatment      Consent for Transfer:  I acknowledge that my medical condition has been evaluated and explained to me by the emergency department physician or other qualified medical person and/or my attending physician, who has recommended that I be transferred to the service of  Accepting Physician: Tiffanie Monge MD at [address], [address] : [address], [facility] [address]  The above potential benefits of such transfer, the potential risks associated with such transfer, and the probable risks of not being transferred have been explained to me, and I fully understand them  The doctor has explained that, in my case, the benefits of transfer outweigh the risks  I agree to be transferred  I authorize the performance of emergency medical procedures and treatments upon me in both transit and upon arrival at the receiving facility  Additionally, I authorize the release of any and all medical records to the receiving facility and request they be transported with me, if possible  I understand that the safest mode of transportation during a medical emergency is an ambulance and that the Hospital advocates the use of this mode of transport  Risks of traveling to the receiving facility by car, including absence of medical control, life sustaining equipment, such as oxygen, and medical personnel has been explained to me and I fully understand them      (USMAN CORRECT BOX BELOW)  [  ]  I consent to the stated transfer and to be transported by ambulance/helicopter  [  ]  I consent to the stated transfer, but refuse transportation by ambulance and accept full responsibility for my transportation by car  I understand the risks of non-ambulance transfers and I exonerate the Hospital and its staff from any deterioration in my condition that results from this refusal     X___________________________________________    DATE  23  TIME________  Signature of patient or legally responsible individual signing on patient behalf           RELATIONSHIP TO PATIENT_________________________          Provider Certification    NAME Vanessa Ruano                                         2000                              MRN [id number]    A medical screening exam was performed on the above named patient  Based on the examination:    Condition Necessitating Transfer The primary encounter diagnosis was Hallucinations  A diagnosis of Schizoaffective disorder, unspecified type ([facility] ) was also pertinent to this visit      Patient Condition: The patient has been stabilized such that within reasonable medical probability, no material deterioration of the patient condition or the condition of the unborn child(jazlyn) is likely to result from the transfer    Reason for Transfer: No bed available at level of patient's needs    Transfer Requirements: [address] [place] [address], [address] [facility] [address]   · Space available and qualified personnel available for treatment as acknowledged by Ashutoshtjulia [phone]  · Agreed to accept transfer and to provide appropriate medical treatment as acknowledged by       Washington Bell MD  · Appropriate medical records of the examination and treatment of the patient are provided at the time of transfer   [address] [facility], [address] _______  · Transfer will be performed by qualified personnel from [phone]   and appropriate transfer equipment as required, including the use of necessary and appropriate life support measures  Provider Certification: I have examined the patient and explained the following risks and benefits of being transferred/refusing transfer to the patient/family:  General risk, such as traffic hazards, adverse weather conditions, rough terrain or turbulence, possible failure of equipment (including vehicle or aircraft), or consequences of actions of persons outside the control of the transport personnel      Based on these reasonable risks and benefits to the patient and/or the unborn child(jazlyn), and based upon the information available at the time of the patient’s examination, I certify that the medical benefits reasonably to be expected from the provision of appropriate medical treatments at another medical facility outweigh the increasing risks, if any, to the individual’s medical condition, and in the case of labor to the unborn child, from effecting the transfer      X____________________________________________ DATE 06/21/23        TIME_______      ORIGINAL - SEND TO MEDICAL RECORDS   COPY - SEND WITH PATIENT DURING TRANSFER

## 2023-06-22 PROBLEM — F33.9 RECURRENT MAJOR DEPRESSIVE DISORDER (HCC): Status: ACTIVE | Noted: 2023-06-22

## 2023-06-22 PROBLEM — Z78.9 ALCOHOL USE: Status: ACTIVE | Noted: 2023-06-22

## 2023-06-22 PROBLEM — E66.9 CLASS 1 OBESITY IN ADULT: Status: ACTIVE | Noted: 2023-06-22

## 2023-06-22 LAB
25(OH)D3 SERPL-MCNC: 13.5 NG/ML (ref 30–100)
ALBUMIN SERPL BCP-MCNC: 4.3 G/DL (ref 3.5–5)
ALP SERPL-CCNC: 65 U/L (ref 34–104)
ALT SERPL W P-5'-P-CCNC: 36 U/L (ref 7–52)
ANION GAP SERPL CALCULATED.3IONS-SCNC: 6 MMOL/L
AST SERPL W P-5'-P-CCNC: 22 U/L (ref 13–39)
BACTERIA UR QL AUTO: ABNORMAL /HPF
BASOPHILS # BLD AUTO: 0.08 THOUSANDS/ÂΜL (ref 0–0.1)
BASOPHILS NFR BLD AUTO: 1 % (ref 0–1)
BILIRUB SERPL-MCNC: 0.31 MG/DL (ref 0.2–1)
BILIRUB UR QL STRIP: NEGATIVE
BUN SERPL-MCNC: 10 MG/DL (ref 5–25)
CALCIUM SERPL-MCNC: 9.3 MG/DL (ref 8.4–10.2)
CHLORIDE SERPL-SCNC: 103 MMOL/L (ref 96–108)
CHOLEST SERPL-MCNC: 163 MG/DL
CLARITY UR: CLEAR
CO2 SERPL-SCNC: 28 MMOL/L (ref 21–32)
COLOR UR: YELLOW
CREAT SERPL-MCNC: 0.66 MG/DL (ref 0.6–1.3)
EOSINOPHIL # BLD AUTO: 0.52 THOUSAND/ÂΜL (ref 0–0.61)
EOSINOPHIL NFR BLD AUTO: 5 % (ref 0–6)
ERYTHROCYTE [DISTWIDTH] IN BLOOD BY AUTOMATED COUNT: 12.9 % (ref 11.6–15.1)
GFR SERPL CREATININE-BSD FRML MDRD: 138 ML/MIN/1.73SQ M
GLUCOSE P FAST SERPL-MCNC: 83 MG/DL (ref 65–99)
GLUCOSE SERPL-MCNC: 83 MG/DL (ref 65–140)
GLUCOSE UR STRIP-MCNC: NEGATIVE MG/DL
HCT VFR BLD AUTO: 44.6 % (ref 36.5–49.3)
HDLC SERPL-MCNC: 43 MG/DL
HGB BLD-MCNC: 14.5 G/DL (ref 12–17)
HGB UR QL STRIP.AUTO: NEGATIVE
IMM GRANULOCYTES # BLD AUTO: 0.08 THOUSAND/UL (ref 0–0.2)
IMM GRANULOCYTES NFR BLD AUTO: 1 % (ref 0–2)
KETONES UR STRIP-MCNC: NEGATIVE MG/DL
LDLC SERPL CALC-MCNC: 84 MG/DL (ref 0–100)
LEUKOCYTE ESTERASE UR QL STRIP: NEGATIVE
LYMPHOCYTES # BLD AUTO: 2.99 THOUSANDS/ÂΜL (ref 0.6–4.47)
LYMPHOCYTES NFR BLD AUTO: 30 % (ref 14–44)
MAGNESIUM SERPL-MCNC: 2.3 MG/DL (ref 1.9–2.7)
MCH RBC QN AUTO: 29.7 PG (ref 26.8–34.3)
MCHC RBC AUTO-ENTMCNC: 32.5 G/DL (ref 31.4–37.4)
MCV RBC AUTO: 91 FL (ref 82–98)
MONOCYTES # BLD AUTO: 0.94 THOUSAND/ÂΜL (ref 0.17–1.22)
MONOCYTES NFR BLD AUTO: 9 % (ref 4–12)
NEUTROPHILS # BLD AUTO: 5.37 THOUSANDS/ÂΜL (ref 1.85–7.62)
NEUTS SEG NFR BLD AUTO: 54 % (ref 43–75)
NITRITE UR QL STRIP: NEGATIVE
NON-SQ EPI CELLS URNS QL MICRO: ABNORMAL /HPF
NONHDLC SERPL-MCNC: 120 MG/DL
NRBC BLD AUTO-RTO: 0 /100 WBCS
PH UR STRIP.AUTO: 6 [PH]
PLATELET # BLD AUTO: 295 THOUSANDS/UL (ref 149–390)
PMV BLD AUTO: 8.5 FL (ref 8.9–12.7)
POTASSIUM SERPL-SCNC: 4 MMOL/L (ref 3.5–5.3)
PROT SERPL-MCNC: 7.3 G/DL (ref 6.4–8.4)
PROT UR STRIP-MCNC: NEGATIVE MG/DL
RBC # BLD AUTO: 4.89 MILLION/UL (ref 3.88–5.62)
RBC #/AREA URNS AUTO: ABNORMAL /HPF
SODIUM SERPL-SCNC: 137 MMOL/L (ref 135–147)
SP GR UR STRIP.AUTO: >=1.03 (ref 1–1.03)
T4 FREE SERPL-MCNC: 0.72 NG/DL (ref 0.61–1.12)
TRIGL SERPL-MCNC: 180 MG/DL
TSH SERPL DL<=0.05 MIU/L-ACNC: 5.83 UIU/ML (ref 0.45–4.5)
UROBILINOGEN UR STRIP-ACNC: <2 MG/DL
WBC # BLD AUTO: 9.98 THOUSAND/UL (ref 4.31–10.16)
WBC #/AREA URNS AUTO: ABNORMAL /HPF

## 2023-06-22 PROCEDURE — 99252 IP/OBS CONSLTJ NEW/EST SF 35: CPT | Performed by: PHYSICIAN ASSISTANT

## 2023-06-22 PROCEDURE — 82306 VITAMIN D 25 HYDROXY: CPT | Performed by: PSYCHIATRY & NEUROLOGY

## 2023-06-22 PROCEDURE — 84443 ASSAY THYROID STIM HORMONE: CPT | Performed by: PSYCHIATRY & NEUROLOGY

## 2023-06-22 PROCEDURE — 84439 ASSAY OF FREE THYROXINE: CPT | Performed by: PSYCHIATRY & NEUROLOGY

## 2023-06-22 PROCEDURE — 85025 COMPLETE CBC W/AUTO DIFF WBC: CPT | Performed by: PSYCHIATRY & NEUROLOGY

## 2023-06-22 PROCEDURE — 83735 ASSAY OF MAGNESIUM: CPT | Performed by: PHYSICIAN ASSISTANT

## 2023-06-22 PROCEDURE — 81001 URINALYSIS AUTO W/SCOPE: CPT | Performed by: PSYCHIATRY & NEUROLOGY

## 2023-06-22 PROCEDURE — 80061 LIPID PANEL: CPT | Performed by: PSYCHIATRY & NEUROLOGY

## 2023-06-22 PROCEDURE — 80053 COMPREHEN METABOLIC PANEL: CPT | Performed by: PSYCHIATRY & NEUROLOGY

## 2023-06-22 PROCEDURE — 99223 1ST HOSP IP/OBS HIGH 75: CPT | Performed by: STUDENT IN AN ORGANIZED HEALTH CARE EDUCATION/TRAINING PROGRAM

## 2023-06-22 RX ORDER — FLUOXETINE HYDROCHLORIDE 20 MG/1
20 CAPSULE ORAL DAILY
Status: DISCONTINUED | OUTPATIENT
Start: 2023-06-22 | End: 2023-06-30 | Stop reason: HOSPADM

## 2023-06-22 RX ORDER — BENZTROPINE MESYLATE 0.5 MG/1
0.5 TABLET ORAL
Status: DISCONTINUED | OUTPATIENT
Start: 2023-06-22 | End: 2023-06-30 | Stop reason: HOSPADM

## 2023-06-22 RX ORDER — BISACODYL 10 MG
10 SUPPOSITORY, RECTAL RECTAL DAILY PRN
Status: DISCONTINUED | OUTPATIENT
Start: 2023-06-22 | End: 2023-06-30 | Stop reason: HOSPADM

## 2023-06-22 RX ORDER — PALIPERIDONE 3 MG/1
3 TABLET, EXTENDED RELEASE ORAL DAILY
Status: DISCONTINUED | OUTPATIENT
Start: 2023-06-22 | End: 2023-06-22

## 2023-06-22 RX ORDER — CHLORPROMAZINE HYDROCHLORIDE 50 MG/1
TABLET, FILM COATED ORAL
Status: COMPLETED
Start: 2023-06-22 | End: 2023-06-22

## 2023-06-22 RX ORDER — DIPHENHYDRAMINE HCL 50 MG
CAPSULE ORAL
Status: COMPLETED
Start: 2023-06-22 | End: 2023-06-22

## 2023-06-22 RX ORDER — RISPERIDONE 2 MG/1
2 TABLET ORAL
Status: DISCONTINUED | OUTPATIENT
Start: 2023-06-22 | End: 2023-06-30 | Stop reason: HOSPADM

## 2023-06-22 RX ORDER — TRAZODONE HYDROCHLORIDE 50 MG/1
50 TABLET ORAL
Status: DISCONTINUED | OUTPATIENT
Start: 2023-06-22 | End: 2023-06-30 | Stop reason: HOSPADM

## 2023-06-22 RX ORDER — POLYETHYLENE GLYCOL 3350 17 G/17G
17 POWDER, FOR SOLUTION ORAL DAILY PRN
Status: DISCONTINUED | OUTPATIENT
Start: 2023-06-22 | End: 2023-06-30 | Stop reason: HOSPADM

## 2023-06-22 RX ORDER — CHLORPROMAZINE HYDROCHLORIDE 50 MG/1
50 TABLET, FILM COATED ORAL ONCE
Status: COMPLETED | OUTPATIENT
Start: 2023-06-22 | End: 2023-06-22

## 2023-06-22 RX ORDER — DIPHENHYDRAMINE HCL 25 MG
50 TABLET ORAL ONCE
Status: COMPLETED | OUTPATIENT
Start: 2023-06-22 | End: 2023-06-22

## 2023-06-22 RX ADMIN — TRAZODONE HYDROCHLORIDE 50 MG: 50 TABLET ORAL at 00:47

## 2023-06-22 RX ADMIN — BENZTROPINE MESYLATE 0.5 MG: 0.5 TABLET ORAL at 21:55

## 2023-06-22 RX ADMIN — FOLIC ACID 1 MG: 1 TABLET ORAL at 08:30

## 2023-06-22 RX ADMIN — CHLORPROMAZINE HYDROCHLORIDE 50 MG: 50 TABLET, COATED ORAL at 14:15

## 2023-06-22 RX ADMIN — THIAMINE HCL TAB 100 MG 100 MG: 100 TAB at 08:30

## 2023-06-22 RX ADMIN — CHLORPROMAZINE HYDROCHLORIDE 50 MG: 50 TABLET, FILM COATED ORAL at 14:15

## 2023-06-22 RX ADMIN — TRAZODONE HYDROCHLORIDE 50 MG: 50 TABLET ORAL at 21:55

## 2023-06-22 RX ADMIN — Medication 1 TABLET: at 08:30

## 2023-06-22 RX ADMIN — HALOPERIDOL 5 MG: 5 TABLET ORAL at 12:25

## 2023-06-22 RX ADMIN — DIPHENHYDRAMINE HYDROCHLORIDE 50 MG: 50 CAPSULE ORAL at 14:12

## 2023-06-22 RX ADMIN — FLUOXETINE 20 MG: 20 CAPSULE ORAL at 10:40

## 2023-06-22 RX ADMIN — RISPERIDONE 2 MG: 2 TABLET ORAL at 21:55

## 2023-06-22 RX ADMIN — HYDROXYZINE HYDROCHLORIDE 100 MG: 50 TABLET, FILM COATED ORAL at 12:25

## 2023-06-22 RX ADMIN — DIPHENHYDRAMINE HYDROCHLORIDE 50 MG: 25 TABLET ORAL at 14:16

## 2023-06-22 NOTE — PROGRESS NOTES
Treatment Plan Meeting:  Diagnosis of Recurrent major depressive disorder and Alcohol use reviewed. Discussed short term goals for decrease in depressive symptoms, decrease in anxiety symptoms, decrease in paranoid thoughts, decrease in psychotic symptoms, improvement in insight, sleep improvement, improvement in appetite, and mood stabilization. At this time, no discharge date is set. All parties in agreement & treatment plan was signed.      06/22/23 1445   Team Meeting   Meeting Type Tx Team Meeting   Initial Conference Date 06/22/23   Next Conference Date 07/19/23   Team Members Present   Team Members Present Physician;Nurse;   Physician Team Member Dr. Savannah Lima Team Member [facility] Management Team Member Demarcus   Patient/Family Present   Patient Present No  (Pt declined to meet with the treatment team.)   Patient's Family Present No
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
## 2023-06-22 NOTE — ASSESSMENT & PLAN NOTE
• At this time, patient appears medically stable to continue inpatient psychiatric management. • Current labs, nursing notes and imaging reviewed. o Pending Labs: TSH, CMP, Lipid panel, Mag, CBC, vitamin D, UA, B12, folate  • Recent EKG: NSR 84 bpm. . TWI III.

## 2023-06-22 NOTE — NURSING NOTE
Was observed pacing about unit at the onset of the shift. Did accept Trazodone 50 mg. Po prn/sleep at 0047, as encouraged. Good effect obtained within an hour, as observed asleep in bed within the hr. & remains asleep presently. Will continue to monitor.
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
## 2023-06-22 NOTE — TREATMENT TEAM
06/22/23 1230   Activity/Group Checklist   Group Other (Comment)  (art therapy)   Attendance Attended   Attendance Duration (min) 31-45   Interactions Interacted appropriately   Affect/Mood Calm   Goals Achieved Able to listen to others; Able to engage in interactions; Other (Comment)  (authentic, spontaneous self expression, connection, and insight)

## 2023-06-22 NOTE — TREATMENT PLAN
TREATMENT PLAN REVIEW - [facility] 26 y.o. 2000 male MRN: [id number]    Ana Lerma Room / Bed: [address] 460-47 Encounter: [id number]          Admit Date/Time:  6/21/2023  3:05 PM    Treatment Team: Attending Provider: Marva Ford MD; Charge Nurse: Henri Hernandez RN; Care Manager: Sofy Shaikh RN; Registered Nurse: Jennifer Torres, RN; Nursing Student: Mindy Wong; Patient Care Technician: Justen Patricia; Patient Care Assistant: Mateo Garland; Security: Lois Nate; Registered Nurse: Main Jimenez RN; Licensed Practical Nurse: Amari Stoner LPN; Registered Nurse: Celestino Salazar RN; : George Marquez; Patient Care Assistant: Kathleen Schmitz    Diagnosis: Active Problems:    Medical clearance for psychiatric admission    Tobacco abuse    Class 1 obesity in adult    Recurrent major depressive disorder ([address])    Alcohol use      Patient Strengths/Assets: cooperative, motivation for treatment/growth, patient is on a voluntary commitment    Patient Barriers/Limitations: limited support system, noncompliant with medication, poor insight, self-care deficit, substance abuse    Short Term Goals: decrease in depressive symptoms, decrease in anxiety symptoms, decrease in paranoid thoughts, decrease in psychotic symptoms, improvement in insight, sleep improvement, improvement in appetite, mood stabilization    Long Term Goals: improvement in depression, improvement in anxiety, resolution of depressive symptoms, resolution of manic symptoms, stabilization of mood, free of suicidal thoughts, no self abusive behavior, improved insight, acceptance of need for psychiatric medications, acceptance of need for psychiatric treatment, adequate self care, adequate sleep, adequate appetite    Progress Towards Goals: starting psychiatric medications as prescribed, starting alcohol detoxification protocol    Recommended Treatment: medication management, patient medication education, group therapy, milieu therapy, continued Behavioral Health psychiatric evaluation/assessment process    Treatment Frequency: daily medication monitoring, group and milieu therapy daily, monitoring through interdisciplinary rounds, monitoring through weekly patient care conferences    Expected Discharge Date:  5-7 days    Discharge Plan: referral for outpatient medication management with a psychiatrist, referral for outpatient psychotherapy    Treatment Plan Created/Updated By: Karen Armstrong MD

## 2023-06-22 NOTE — PROGRESS NOTES
[facility] Group Note     06/22/23 1400   Activity/Group Checklist   Group Personal control  (Music and Lyrics)   Attendance Attended   Attendance Duration (min) 16-30  (in and out of group)   Interactions Other (Comment)  (verbally scant; only talked to select peers)   Affect/Mood Appropriate   Goals Achieved Able to listen to others; Able to engage in interactions; Able to recieve feedback; Able to give feedback to another  (benefited from social presence of the group)

## 2023-06-22 NOTE — CASE MANAGEMENT
Readmit score:  27(RED)   Confirmed Address:    [place]: [address] [place] [address], [address] [place] [address] Road   Resides in the home with:   Pt reported he lives with his mom. Will Return Home at Discharge: Yes   Confirmed Phone Number: (brother Milana Maldonado) [phone]   Confirmed Email Address: None     Marital Status:      Children: Single    None   Family History:                       Parents:                     Siblings: Pt denied any family history of mental health and/or substance abuse. Pt said that his parents are both supportive. Pt has 3 older brothers. Commitment Status:  201   Admitted from:   [facility] ER on 6/20/2023 @ 2217   Presenting C/O:   Pt reported that he asked to come to the hospital because his depression was not okay. Past Inpatient Tx:   [facility]: April 10-20, 2023  [facility]: January 5-18, 2022   Past Suicide Attempts: Pt denied   Current outpatient:    Psychiatrist:   None - Pt referred to [facility] in the past but did not attend intake appointments; he said he rather be referred to another provider. Therapist:   None   ACT/ICM/CPS/WRT/SC: None - referred to [facility] in the past.    PCP:   None     Med Hx/Concern:   Pt denied any medical concerns at this time. Medications:   Pt was not taking medications prior to his admission, but said he needs to. Pharmacy:   None preferred. Spirituality/Christianity: Pt denied any Yarsani/spiritual request.    Education:   11th grade completed   Work/Income:   Pt is not currently working. He asked about applying for SSI. Legal:    Pt denied   Access to Firearms: Pt denied   Transportation:   Pt reported that he typically walks. Goal:   Pt identified his goal is get medications working for him. Referrals Needed:     Pt agreeable with a referral for outpatient mental health. Pt would like another provider than [facility] & CM discussed [facility], [facility], & [facility]. CM will see which is closest & accepting new patients.   Pt declined an ICM referral at this time. Transport at Discharge: [facility]   IMM: N/A [place] Text PATRIZIA: Pt declined, he doesn't currently have a cell phone. Emergency Contact:  Angela Lee(aunt) [phone]   ROIs obtained:   Jakob Lee(brother) [phone]   Insurance:    [facility]    Audit: 33       PAWSS: 3   BAT: 0 UDS: + THC     Substance Abuse: Freq. Amount Last Use Notes:   Heroin       Amp/Meth       Alcohol Not daily Drinks enough beers to get drunk  Pt very vague regarding EtOH, stating he doesn't drink everyday, but when he does drink he usually gets drunk. He reported he started drinking EtOH, "a few years ago". Cocaine       Cannabis Quit   Pt said he quit recently. Benzodiazepine       Barbituartes       Other       Tobacco Hookah         Pt read and signed his Treatment Plan with CM.

## 2023-06-22 NOTE — TREATMENT TEAM
06/22/23 1030   Activity/Group Checklist   Group Other (Comment)  (art therapy)   Attendance Attended   Attendance Duration (min) 16-30   Interactions Interacted appropriately   Affect/Mood Appropriate   Goals Achieved Able to listen to others; Able to engage in interactions; Other (Comment)  (authentic, spontaneous self expression, connection, and insight)

## 2023-06-22 NOTE — PLAN OF CARE
Problem: DISCHARGE PLANNING  Goal: Discharge to home or other facility with appropriate resources  Description: INTERVENTIONS:  - Identify barriers to discharge w/patient and caregiver  - Arrange for needed discharge resources and transportation as appropriate  - Identify discharge learning needs (meds, wound care, etc.)  - Arrange for interpretive services to assist at discharge as needed  - Refer to Case Management Department for coordinating discharge planning if the patient needs post-hospital services based on physician/advanced practitioner order or complex needs related to functional status, cognitive ability, or social support system  Outcome: Progressing  Note: Pt met with CM to review & kimberly ROIs & complete intake. Pt read & signed Treatment Plan with CM.

## 2023-06-22 NOTE — PROGRESS NOTES
Status: Pt is a 201 from [facility], brought in by family due to ruminating thoughts, & Pt being easily angered. Pt reported auditory & visual hallucinations, but denied any SI/HI. Pt was d/c from [facility] in April 2023 & did not  his medications nor attend outpatient appointments scheduled for him. It was reported he had poor ADLs at home & decreased sleep, but he did shower upon arrival to the unit. Pt appeared delayed & internally preoccupied. He reported he smokes THC 2-3 x/wk & drinks EtOH 5 x/wk, about 20 beers or mixed drinks. Pt reported PRN & slept. Reviewed readmit score: 27(RED), AUDIT: 33, PAWSS: 3, BAT: 0, UDS: + THC  Medication: to be reviewed / PRN - Trazodone  D/C: TBD / new admission      06/22/23 0750   Team Meeting   Meeting Type Daily Rounds   Team Members Present   Team Members Present Physician;Nurse;; Other (Discipline and Name)   Physician Team Member Dr. Ramu Parry / Lily Pena Team Member Nhan Mccollum / Fazal Rivas Management Team Member Hazel Tucker / Iman Mazariegos / Thomas Mejias   Other (Discipline and Name) Rajan(art therapy)   Patient/Family Present   Patient Present No   Patient's Family Present No

## 2023-06-22 NOTE — NURSING NOTE
PRN Atarax 100mg po given at this time for anxiety. Edwards score 31. PRN Haldol 5mg po given at this time for increased agitation. Broset score 4.
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
## 2023-06-22 NOTE — CMS CERTIFICATION NOTE
Recertification: Based upon physical, mental and social evaluations, I certify that inpatient psychiatric services continue to be medically necessary for this patient for a duration of 7 midnights for the treatment of  <principal problem not specified>   Available alternative community resources still do not meet the patient's mental health care needs. I further attest that an established written individualized plan of care has been updated and is outlined in the patient's medical records.

## 2023-06-22 NOTE — PROGRESS NOTES
Met with pt to complete self-assessment paperwork, which pt stated he would like to work on tomorrow. Pt recognized ASHA/L from previous admissions and discussed some of the issues he was having at home. Pt stated he switched from mainly smoking marijuana to drinking alcohol because his father "would rather see me with a beer in my hand than a blunt." BARRY/L informed pt of the dangers of mixing alcohol with most psych meds and encouraged pt to explore some of the reasons why he's using while he's in the hospital, which pt seemed open to. Pt appeared anxious and paranoid about being on the unit, so BARRY/L explained the split unit/group schedule and oriented pt to some basic expectations while he's here. Plan to follow up to complete paperwork.

## 2023-06-23 LAB
FOLATE SERPL-MCNC: 9.1 NG/ML
VIT B12 SERPL-MCNC: 152 PG/ML (ref 180–914)

## 2023-06-23 PROCEDURE — GZHZZZZ GROUP PSYCHOTHERAPY: ICD-10-PCS | Performed by: STUDENT IN AN ORGANIZED HEALTH CARE EDUCATION/TRAINING PROGRAM

## 2023-06-23 PROCEDURE — GZ58ZZZ INDIVIDUAL PSYCHOTHERAPY, COGNITIVE-BEHAVIORAL: ICD-10-PCS | Performed by: STUDENT IN AN ORGANIZED HEALTH CARE EDUCATION/TRAINING PROGRAM

## 2023-06-23 PROCEDURE — 82746 ASSAY OF FOLIC ACID SERUM: CPT | Performed by: PSYCHIATRY & NEUROLOGY

## 2023-06-23 PROCEDURE — 82607 VITAMIN B-12: CPT | Performed by: PSYCHIATRY & NEUROLOGY

## 2023-06-23 PROCEDURE — 99232 SBSQ HOSP IP/OBS MODERATE 35: CPT | Performed by: STUDENT IN AN ORGANIZED HEALTH CARE EDUCATION/TRAINING PROGRAM

## 2023-06-23 RX ORDER — RISPERIDONE 1 MG/1
1 TABLET ORAL DAILY
Status: DISCONTINUED | OUTPATIENT
Start: 2023-06-23 | End: 2023-06-30 | Stop reason: HOSPADM

## 2023-06-23 RX ORDER — BENZTROPINE MESYLATE 0.5 MG/1
0.5 TABLET ORAL DAILY
Status: DISCONTINUED | OUTPATIENT
Start: 2023-06-23 | End: 2023-06-30 | Stop reason: HOSPADM

## 2023-06-23 RX ADMIN — BENZTROPINE MESYLATE 0.5 MG: 0.5 TABLET ORAL at 09:50

## 2023-06-23 RX ADMIN — Medication 1 TABLET: at 09:38

## 2023-06-23 RX ADMIN — THIAMINE HCL TAB 100 MG 100 MG: 100 TAB at 09:38

## 2023-06-23 RX ADMIN — BENZTROPINE MESYLATE 0.5 MG: 0.5 TABLET ORAL at 21:37

## 2023-06-23 RX ADMIN — TRAZODONE HYDROCHLORIDE 50 MG: 50 TABLET ORAL at 21:37

## 2023-06-23 RX ADMIN — RISPERIDONE 2 MG: 2 TABLET ORAL at 21:38

## 2023-06-23 RX ADMIN — FLUOXETINE 20 MG: 20 CAPSULE ORAL at 09:38

## 2023-06-23 RX ADMIN — FOLIC ACID 1 MG: 1 TABLET ORAL at 09:38

## 2023-06-23 RX ADMIN — RISPERIDONE 1 MG: 1 TABLET ORAL at 09:53

## 2023-06-23 RX ADMIN — HYDROXYZINE HYDROCHLORIDE 100 MG: 50 TABLET, FILM COATED ORAL at 12:36

## 2023-06-23 NOTE — TREATMENT TEAM
06/23/23 1030   Activity/Group Checklist   Group Other (Comment)  (art therapy)   Attendance Attended   Attendance Duration (min) 16-30   Interactions Did not interact   Affect/Mood Calm

## 2023-06-23 NOTE — NURSING NOTE
Pt visible at times throughout morning, attending some groups. Scant in conversation, walking past writer when asking assessment questions. Pt appears internally preoccupied, denies AVH.

## 2023-06-23 NOTE — TREATMENT TEAM
06/23/23 1230   Activity/Group Checklist   Group Other (Comment)  (art therapy)   Attendance Attended   Attendance Duration (min) 31-45   Interactions Interacted appropriately   Affect/Mood Appropriate   Goals Achieved Identified feelings; Able to listen to others; Able to engage in interactions; Other (Comment)  (authentic, spontaneous self expression, connection, and insight)

## 2023-06-23 NOTE — NURSING NOTE
Patient appeared to sleep in bed throughout the night. No periods of restlessness observed. Maintained on q 7 minute checks by staff throughout the shift for safety. santiago horton  Phone: (   )    -  Fax: (   )    -  Follow Up Time:

## 2023-06-23 NOTE — NURSING NOTE
Pt reported to this writer "really bad" anxiety. Pt states he would like to take medication for assistance in managing symptoms. Pt did not elaborate what triggered anxiety and was scant during conversation. Pt given Atarax 100 mg for a hernandez score of 29. Upon follow up pt is in bed and appears to be sleeping.

## 2023-06-23 NOTE — NURSING NOTE
Patient awakened to perform CIWA (0) check at [address]. Upon awakening pt presents calm and cooperative with writer. He answered questions scantly yet appropriate. He denies all psych symptoms at this time- no SI, HI, or A/VH's. He remained in his room throughout the evening and was respectful to staff and peers. Pt compliant with medication administration without prompting, no side effects reported. Will continue Q 7 min safety checks.
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
## 2023-06-24 PROCEDURE — 99232 SBSQ HOSP IP/OBS MODERATE 35: CPT | Performed by: PSYCHIATRY & NEUROLOGY

## 2023-06-24 RX ADMIN — FOLIC ACID 1 MG: 1 TABLET ORAL at 08:13

## 2023-06-24 RX ADMIN — TRAZODONE HYDROCHLORIDE 50 MG: 50 TABLET ORAL at 21:37

## 2023-06-24 RX ADMIN — RISPERIDONE 2 MG: 2 TABLET ORAL at 21:37

## 2023-06-24 RX ADMIN — THIAMINE HCL TAB 100 MG 100 MG: 100 TAB at 08:12

## 2023-06-24 RX ADMIN — BENZTROPINE MESYLATE 0.5 MG: 0.5 TABLET ORAL at 08:13

## 2023-06-24 RX ADMIN — BENZTROPINE MESYLATE 0.5 MG: 0.5 TABLET ORAL at 21:37

## 2023-06-24 RX ADMIN — FLUOXETINE 20 MG: 20 CAPSULE ORAL at 08:12

## 2023-06-24 RX ADMIN — RISPERIDONE 1 MG: 1 TABLET ORAL at 08:13

## 2023-06-24 RX ADMIN — Medication 1 TABLET: at 08:13

## 2023-06-24 RX ADMIN — NICOTINE POLACRILEX 4 MG: 4 GUM, CHEWING BUCCAL at 17:54

## 2023-06-24 NOTE — NURSING NOTE
Pt has been visible throughout the day, attending some groups. Completes ADLs. Pt often seclusive to self. Denies questions or concerns. Reports mood as "okay", denies SI, HI, AVH. No paranoid or disorganized thoughts.

## 2023-06-24 NOTE — NURSING NOTE
Patient is mostly seclusive to himself scant in conversation. Continues to report elevated anxiety at times and stated that Prn medication was somewhat effective for him. Attempted to discuss coping skills and patient with poor eye contact and seems disinterested in speaking with staff. Denies SI HI and hallucinations however does appear to be internally preoccupied. Talking to himself. Denies questions or concerns. Staff availability reniforced.

## 2023-06-24 NOTE — CASE MANAGEMENT
CM met briefly with Pt, who was in his room. CM had been told that Pt was requesting to talk to CM. Pt said he just wanted to know if there was a d/c date. CM said that no date had been set yet at this time; Pt verbalized understanding.

## 2023-06-24 NOTE — TREATMENT TEAM
06/24/23 1000   Team Meeting   Meeting Type Daily Rounds   Team Members Present   Team Members Present Physician;Nurse   Physician Team Member Hilario/Rishi   Nursing Team Member Salvatore   Patient/Family Present   Patient Present No   Patient's Family Present No     Daily Rounds: Pt visible but isolative to self. Scant in conversation. Fluctuating anxiety. Slept.

## 2023-06-24 NOTE — PROGRESS NOTES
Progress Note - [id number] Mchen[address] Cindy E 26 y.o. male MRN: [id number]  Unit/Bed#: [facility] 215-02 Encounter: [id number]    Assessment:  Principal Problem:    Recurrent major depressive disorder ([address])  Active Problems:    Medical clearance for psychiatric admission    Tobacco abuse    Class 1 obesity in adult    Alcohol use      Plan:  --Continue with psychiatric hospitalization  --Continue with individual, group, and milieu therapy  --Continue the following medications:  Current Facility-Administered Medications   Medication Dose Route Frequency   • acetaminophen (TYLENOL) tablet 650 mg  650 mg Oral Q6H PRN   • acetaminophen (TYLENOL) tablet 650 mg  650 mg Oral Q4H PRN   • acetaminophen (TYLENOL) tablet 975 mg  975 mg Oral Q6H PRN   • aluminum-magnesium hydroxide-simethicone (MYLANTA) oral suspension 30 mL  30 mL Oral Q4H PRN   • haloperidol lactate (HALDOL) injection 2.5 mg  2.5 mg Intramuscular Q4H PRN Max 4/day    And   • LORazepam (ATIVAN) injection 1 mg  1 mg Intramuscular Q4H PRN Max 4/day    And   • benztropine (COGENTIN) injection 0.5 mg  0.5 mg Intramuscular Q4H PRN Max 4/day   • haloperidol lactate (HALDOL) injection 5 mg  5 mg Intramuscular Q4H PRN Max 4/day    And   • LORazepam (ATIVAN) injection 2 mg  2 mg Intramuscular Q4H PRN Max 4/day    And   • benztropine (COGENTIN) injection 1 mg  1 mg Intramuscular Q4H PRN Max 4/day   • benztropine (COGENTIN) tablet 0.5 mg  0.5 mg Oral HS   • benztropine (COGENTIN) tablet 0.5 mg  0.5 mg Oral Daily   • benztropine (COGENTIN) tablet 1 mg  1 mg Oral Q4H PRN Max 6/day   • bisacodyl (DULCOLAX) rectal suppository 10 mg  10 mg Rectal Daily PRN   • hydrOXYzine HCL (ATARAX) tablet 50 mg  50 mg Oral Q6H PRN Max 4/day    Or   • diphenhydrAMINE (BENADRYL) injection 50 mg  50 mg Intramuscular Q6H PRN   • FLUoxetine (PROzac) capsule 20 mg  20 mg Oral Daily   • folic acid (FOLVITE) tablet 1 mg  1 mg Oral Daily   • haloperidol (HALDOL) tablet 1 mg  1 mg Oral Q6H PRN   • haloperidol (HALDOL) tablet 2.5 mg  2.5 mg Oral Q4H PRN Max 4/day   • haloperidol (HALDOL) tablet 5 mg  5 mg Oral Q4H PRN Max 4/day   • hydrOXYzine HCL (ATARAX) tablet 100 mg  100 mg Oral Q6H PRN Max 4/day    Or   • LORazepam (ATIVAN) injection 2 mg  2 mg Intramuscular Q6H PRN   • hydrOXYzine HCL (ATARAX) tablet 25 mg  25 mg Oral Q6H PRN Max 4/day   • LORazepam (ATIVAN) tablet 2 mg  2 mg Oral Q4H PRN   • multivitamin-minerals (CENTRUM) tablet 1 tablet  1 tablet Oral Daily   • nicotine polacrilex (NICORETTE) gum 4 mg  4 mg Oral Q2H PRN   • polyethylene glycol (MIRALAX) packet 17 g  17 g Oral Daily PRN   • risperiDONE (RisperDAL) tablet 1 mg  1 mg Oral Daily   • risperiDONE (RisperDAL) tablet 2 mg  2 mg Oral HS   • senna-docusate sodium (SENOKOT S) 8.6-50 mg per tablet 1 tablet  1 tablet Oral Daily PRN   • thiamine tablet 100 mg  100 mg Oral Daily   • traZODone (DESYREL) tablet 50 mg  50 mg Oral HS PRN   • traZODone (DESYREL) tablet 50 mg  50 mg Oral HS       Subjective: Patient was seen for continuation of care. Chart was reviewed and discussed with treatment team.     No acute behavioral events over the past 24 hours. Today, patient was seen and examined at bedside for continuation of care. He is scant in converstaion, and a bit reluctant to speak to me, but he is cooperative. Patient denied adverse effects to their psychiatric medication regimen. Patient denied other new or worsening psychiatric symptoms/complaints at this time. Discussed the importance of continuing to take medications as prescribed, as well as the importance of continuing to attend groups on the unit.      Psychiatric Review of Systems:  Medication adverse effects: none  Sleep: unchanged  Appetite: unchanged  Behavior over the past 24 hours: as per above    Vitals:  Vitals:    06/24/23 0742   BP: 111/74   Pulse: 83   Resp: 18   Temp: 97.9 °F (36.6 °C)   SpO2: 99%       Laboratory results:    I have personally reviewed all pertinent laboratory/tests results  Recent Results (from the past 48 hour(s))   Vitamin B12    Collection Time: 06/23/23  6:36 AM   Result Value Ref Range    Vitamin B-12 152 (L) 180 - 914 pg/mL   Folate    Collection Time: 06/23/23  6:36 AM   Result Value Ref Range    Folate 9.1 >5.9 ng/mL        Current Medications:  Current medications as per above. All medications have been reviewed. Risks, benefits, alternatives, and possible side effects of patient's psychiatric medications were discussed with patient. Mental Status Evaluation:  Appearance: casually dressed, consistent with stated age  Motor: +psychomotor retardation, no gait abnormalities  Behavior: cooperative, answers questions appropriately  Speech: soft, scant  Mood: "okay"  Affect: constricted, depressed-appearing  Thought Process: linear and goal-oriented  Thought Content: denies auditory hallucinations, denies visual hallucinations, denies delusions  Risk Potential: denies suicidal ideation, plan, or intent. Denies homicidal ideation  Sensorium: Oriented to person, place, time, and situation  Cognition: cognitive ability appears intact but was not quantitatively tested  Consciousness: alert and awake  Attention: intact, able to focus without difficulty  Insight: fair  Judgement: fair        Progress Toward Goals & Illness Status: Patient is not at goal. They are not yet ready for discharge. The patient's condition currently requires active psychopharmacological medication management, interdisciplinary coordination with case management, and the utilization of adjunctive milieu and group therapy to augment psychopharmacological efficacy. The patient's risk of morbidity, and progression or decompensation of psychiatric disease, is higher without this current treatment. This note has been constructed using a voice recognition system. There may be translation, syntax, or grammatical errors.  If you have any questions, please contact the dictating provider.

## 2023-06-24 NOTE — NURSING NOTE
Patient appears to have slept the night, waking briefly for about an hour then returning to sleep. Observed to be resting in bed with eyes closed, respirations even and unlabored. No s/s of distress. Q 7.5 minute checks ongoing for patient safety.

## 2023-06-25 PROCEDURE — 99232 SBSQ HOSP IP/OBS MODERATE 35: CPT | Performed by: PSYCHIATRY & NEUROLOGY

## 2023-06-25 RX ORDER — LIDOCAINE 50 MG/G
1 PATCH TOPICAL DAILY
Status: DISCONTINUED | OUTPATIENT
Start: 2023-06-25 | End: 2023-06-30 | Stop reason: HOSPADM

## 2023-06-25 RX ADMIN — BENZTROPINE MESYLATE 0.5 MG: 0.5 TABLET ORAL at 08:28

## 2023-06-25 RX ADMIN — LIDOCAINE 1 PATCH: 50 PATCH TOPICAL at 14:47

## 2023-06-25 RX ADMIN — Medication 1 TABLET: at 08:28

## 2023-06-25 RX ADMIN — BENZTROPINE MESYLATE 0.5 MG: 0.5 TABLET ORAL at 21:51

## 2023-06-25 RX ADMIN — TRAZODONE HYDROCHLORIDE 50 MG: 50 TABLET ORAL at 21:51

## 2023-06-25 RX ADMIN — RISPERIDONE 2 MG: 2 TABLET ORAL at 21:51

## 2023-06-25 RX ADMIN — RISPERIDONE 1 MG: 1 TABLET ORAL at 08:28

## 2023-06-25 RX ADMIN — FOLIC ACID 1 MG: 1 TABLET ORAL at 08:28

## 2023-06-25 RX ADMIN — FLUOXETINE 20 MG: 20 CAPSULE ORAL at 08:28

## 2023-06-25 RX ADMIN — THIAMINE HCL TAB 100 MG 100 MG: 100 TAB at 08:28

## 2023-06-25 NOTE — NURSING NOTE
Patient is visible in the milieu but seclusive to self. Denies SI. HI. And hallucinations. More visible then yesterday. Denies questions or concerns. Staff availability reinforced.

## 2023-06-25 NOTE — PLAN OF CARE
Problem: PSYCHOSIS  Goal: Will report no hallucinations or delusions  Description: Interventions:  - Administer medication as  ordered  - Every waking shifts and PRN assess for the presence of hallucinations and or delusions  - Assist with reality testing to support increasing orientation  - Assess if patient's hallucinations or delusions are encouraging self-harm or harm to others and intervene as appropriate  Outcome: Progressing     Problem: SUBSTANCE USE/ABUSE  Goal: Will have no detox symptoms and will verbalize plan for changing substance-related behavior  Description: INTERVENTIONS:  - Monitor physical status and assess for symptoms of withdrawal  - Administer medication as ordered  - Provide emotional support with 1 on 1 interaction with staff  - Encourage recovery focused program/ addiction education  - Assess for verbalization of changing behaviors related to substance abuse  - Initiate consults and referrals as appropriate (Case Management, Spiritual Care, etc.)  Outcome: Progressing  Goal: By discharge, will develop insight into their chemical dependency and sustain motivation to continue in recovery  Description: INTERVENTIONS:  - Attends all daily group sessions and scheduled AA groups  - Actively practices coping skills through participation in the therapeutic community and adherence to program rules  - Reviews and completes assignments from individual treatment plan  - Assist patient development of understanding of their personal cycle of addiction and relapse triggers  Outcome: Progressing  Goal: By discharge, patient will have ongoing treatment plan addressing chemical dependency  Description: INTERVENTIONS:  - Assist patient with resources and/or appointments for ongoing recovery based living  Outcome: Progressing     Problem: SLEEP DISTURBANCE  Goal: Will exhibit normal sleeping pattern  Description: Interventions:  -  Assess the patients sleep pattern, noting recent changes  - Administer medication as ordered  - Decrease environmental stimuli, including noise, as appropriate during the night  - Encourage the patient to actively participate in unit groups and or exercise during the day to enhance ability to achieve adequate sleep at night  - Assess the patient, in the morning, encouraging a description of sleep experience  Outcome: Progressing     Problem: SAFETY ADULT  Goal: Patient will remain free of falls  Description: INTERVENTIONS:  - Educate patient/family on patient safety including physical limitations  - Instruct patient to call for assistance with activity   - Consult OT/PT to assist with strengthening/mobility   - Keep Call bell within reach  - Keep bed low and locked with side rails adjusted as appropriate  - Keep care items and personal belongings within reach  - Initiate and maintain comfort rounds  - Make Fall Risk Sign visible to staff  - Offer Toileting every Hours, in advance of need  - Initiate/Maintain alarm  - Obtain necessary fall risk management equipment:   - Apply yellow socks and bracelet for high fall risk patients  - Consider moving patient to room near nurses station  Outcome: Progressing

## 2023-06-25 NOTE — NURSING NOTE
Pt has been visible throughout morning, walking with select peers, minimally social. Pt spends times drawing/journaling in room. Pleasant throughout interaction, reports medications are helping, mood improved. Denies SI, HI, AVH. Some anxiety at times. Completes ADLs. Denies questions or concerns at this time.

## 2023-06-25 NOTE — PROGRESS NOTES
Progress Note - Cristobal Jason 26 y.o. male MRN: [id number]   Unit/Bed#: [facility] 215-02 Encounter: [id number]    Documentation, nursing notes, medication reconciliation, labs, and vitals reviewed. Patient was seen for continuing care and reviewed with treatment team. No acute events over the past 24 hours. Per nursing report, patient visible at times, denying symptoms, slept well. No scheduled medication changes over the past 24 hours. Continues to tolerate current medications with no adverse effects. On evaluation today, patient is scant and guarded. Poor insight, reporting poor sleep as primary reason for psychiatric admission. Admits to anxiety at times. Denies anger or irritability. Admits to intermittent AH but does not elaborate on content. No SI/HI.      Psychiatric ROS:  Behavior over the last 24 hours: unchanged  Sleep: normal  Appetite: normal  Medication side effects: No   ROS: no complaints, all other systems are negative    Mental Status Evaluation:    Appearance:  casually dressed, adequate grooming   Behavior:  calm, guarded, limited eye contact   Speech:  scant, soft   Mood:  dysphoric   Affect:  flat   Thought Process:  concrete   Associations: concrete associations   Thought Content:  paranoid ideation   Perceptual Disturbances: auditory hallucinations   Risk Potential: Suicidal ideation - None  Homicidal ideation - None  Potential for aggression - Not at present   Sensorium:  oriented to person, place, time/date and situation   Memory:  recent and remote memory grossly intact   Consciousness:  alert and awake   Attention/Concentration: attention span and concentration appear shorter than expected for age   Insight:  poor   Judgment: limited   Gait/Station: normal gait/station, normal balance   Motor Activity: no abnormal movements     Vital signs in last 24 hours:    Temp:  [97.5 °F (36.4 °C)-98.3 °F (36.8 °C)] 98.2 °F (36.8 °C)  HR:  [] 80  Resp:  [16-18] 16  BP: (127-161)/(65-99) 127/65    Laboratory results: I have personally reviewed all pertinent laboratory/tests results    Results from the past 24 hours: No results found for this or any previous visit (from the past 24 hour(s)).   Most Recent Labs:   Lab Results   Component Value Date    WBC 9.98 06/22/2023    RBC 4.89 06/22/2023    HGB 14.5 06/22/2023    HCT 44.6 06/22/2023     06/22/2023    RDW 12.9 06/22/2023    NEUTROABS 5.37 06/22/2023    SODIUM 137 06/22/2023    K 4.0 06/22/2023     06/22/2023    CO2 28 06/22/2023    BUN 10 06/22/2023    CREATININE 0.66 06/22/2023    GLUC 83 06/22/2023    CALCIUM 9.3 06/22/2023    AST 22 06/22/2023    ALT 36 06/22/2023    ALKPHOS 65 06/22/2023    TP 7.3 06/22/2023    ALB 4.3 06/22/2023    TBILI 0.31 06/22/2023    CHOLESTEROL 163 06/22/2023    HDL 43 06/22/2023    TRIG 180 (H) 06/22/2023    LDLCALC 84 06/22/2023    NONHDLC 120 06/22/2023    KLY2LQRWPCWR 5.827 (H) 06/22/2023    FREET4 0.72 06/22/2023    RPR Non-Reactive 01/07/2022    HGBA1C 5.1 04/11/2023     04/11/2023       Suicide/Homicide Risk Assessment:    Risk of Harm to Self:   Nursing Suicide Risk Assessment Last 24 hours: C-SSRS Risk (Since Last Contact)  Calculated C-SSRS Risk Score (Since Last Contact): No Risk Indicated  Current Specific Risk Factors include: mental illness diagnosis, current psychotic symptoms, poor reasoning  Protective Factors: no current suicidal ideation, ability to communicate with staff on the unit, able to contract for safety on the unit, taking medications as ordered on the unit  Based on today's assessment, Christos Stock presents the following risk of harm to self: low    Risk of Harm to Others:  Nursing Homicide Risk Assessment: Violence Risk to Others: Denies within past 6 months  Current Specific Risk Factors include: current psychotic symptoms, poor insight  Protective Factors: no current homicidal ideation, compliant with medications on the unit as ordered, compliant with unit milieu  Based on today's assessment, Cory Weeks presents the following risk of harm to others: low    The following interventions are recommended: behavioral checks every 7 minutes, continued hospitalization on locked unit    Progress Toward Goals: progressing    Assessment/Plan   Principal Problem:    Recurrent major depressive disorder ([address])  Active Problems:    Medical clearance for psychiatric admission    Tobacco abuse    Class 1 obesity in adult    Alcohol use      Recommended Treatment:     Planned medication and treatment changes:     All current active medications have been reviewed  Encourage group therapy, milieu therapy and occupational therapy  Behavioral Health checks every 7 minutes  Continue current medications:    Current Facility-Administered Medications   Medication Dose Route Frequency Provider Last Rate   • acetaminophen  650 mg Oral Q6H PRN Irvin Caicedo MD     • acetaminophen  650 mg Oral Q4H PRN Irvin Caicedo MD     • acetaminophen  975 mg Oral Q6H PRN Irvin Caicedo MD     • aluminum-magnesium hydroxide-simethicone  30 mL Oral Q4H PRN Irvin Caicedo MD     • haloperidol lactate  2.5 mg Intramuscular Q4H PRN Max 4/day Irvin Caicedo MD      And   • LORazepam  1 mg Intramuscular Q4H PRN Max 4/day Irvin Caicedo MD      And   • benztropine  0.5 mg Intramuscular Q4H PRN Max 4/day Irvin Caicedo MD     • haloperidol lactate  5 mg Intramuscular Q4H PRN Max 4/day Irvin Caicedo MD      And   • LORazepam  2 mg Intramuscular Q4H PRN Max 4/day Irvin Caicedo MD      And   • benztropine  1 mg Intramuscular Q4H PRN Max 4/day Irvin Caicedo MD     • benztropine  0.5 mg Oral HS Jerrica Link MD     • benztropine  0.5 mg Oral Daily Jerrica Link MD     • benztropine  1 mg Oral Q4H PRN Max 6/day Irvin Caicedo MD     • bisacodyl  10 mg Rectal Daily PRN Jerrica Link MD     • hydrOXYzine HCL  50 mg Oral Q6H PRN Max 4/day Diana Santacruz MD      Or   • diphenhydrAMINE  50 mg Intramuscular Q6H PRN Diana Santacruz MD     • FLUoxetine  20 mg Oral Daily Polo Gould MD     • folic acid  1 mg Oral Daily Melvin Juarez PA-C     • haloperidol  1 mg Oral Q6H PRN Diana Santacruz MD     • haloperidol  2.5 mg Oral Q4H PRN Max 4/day Diana Santacruz MD     • haloperidol  5 mg Oral Q4H PRN Max 4/day Diana Santacruz MD     • hydrOXYzine HCL  100 mg Oral Q6H PRN Max 4/day Diana Santacruz MD      Or   • LORazepam  2 mg Intramuscular Q6H PRN Diana Santacruz MD     • hydrOXYzine HCL  25 mg Oral Q6H PRN Max 4/day Diana Santacruz MD     • LORazepam  2 mg Oral Q4H PRN Melvin Juarez PA-C     • multivitamin-minerals  1 tablet Oral Daily Melvin Juarez PA-C     • nicotine polacrilex  4 mg Oral Q2H PRN Diana Santacruz MD     • polyethylene glycol  17 g Oral Daily PRN Polo Gould MD     • risperiDONE  1 mg Oral Daily Polo Gould MD     • risperiDONE  2 mg Oral HS Polo Gould MD     • senna-docusate sodium  1 tablet Oral Daily PRN Diana Santacruz MD     • Thiamine Mononitrate  100 mg Oral Daily Melvin Juarez PA-C     • traZODone  50 mg Oral HS PRN Diana Santacruz MD     • traZODone  50 mg Oral HS Polo Gould MD       Risks / Benefits of Treatment:    Risks, benefits, and possible side effects of medications explained to patient and patient verbalizes understanding and agreement for treatment. Counseling / Coordination of Care:    Patient's progress discussed with staff in treatment team meeting. Medications, treatment progress and treatment plan reviewed with patient.     CHRISTINA John 06/25/23

## 2023-06-25 NOTE — NURSING NOTE
Pt more interactive and social. Period of an increase in anxiety and feeling overwhelmed by roommates high energy. No signs of aggression and able to work self threw. Agreed to seek staff with any complaints or concerns.  Denies SI/HI/AH/VH

## 2023-06-25 NOTE — PLAN OF CARE
Problem: PSYCHOSIS  Goal: Will report no hallucinations or delusions  Description: Interventions:  - Administer medication as  ordered  - Every waking shifts and PRN assess for the presence of hallucinations and or delusions  - Assist with reality testing to support increasing orientation  - Assess if patient's hallucinations or delusions are encouraging self-harm or harm to others and intervene as appropriate  Outcome: Progressing     Problem: SUBSTANCE USE/ABUSE  Goal: Will have no detox symptoms and will verbalize plan for changing substance-related behavior  Description: INTERVENTIONS:  - Monitor physical status and assess for symptoms of withdrawal  - Administer medication as ordered  - Provide emotional support with 1 on 1 interaction with staff  - Encourage recovery focused program/ addiction education  - Assess for verbalization of changing behaviors related to substance abuse  - Initiate consults and referrals as appropriate (Case Management, Spiritual Care, etc.)  Outcome: Progressing  Goal: By discharge, will develop insight into their chemical dependency and sustain motivation to continue in recovery  Description: INTERVENTIONS:  - Attends all daily group sessions and scheduled AA groups  - Actively practices coping skills through participation in the therapeutic community and adherence to program rules  - Reviews and completes assignments from individual treatment plan  - Assist patient development of understanding of their personal cycle of addiction and relapse triggers  Outcome: Progressing  Goal: By discharge, patient will have ongoing treatment plan addressing chemical dependency  Description: INTERVENTIONS:  - Assist patient with resources and/or appointments for ongoing recovery based living  Outcome: Progressing     Problem: SLEEP DISTURBANCE  Goal: Will exhibit normal sleeping pattern  Description: Interventions:  -  Assess the patients sleep pattern, noting recent changes  - Administer medication as ordered  - Decrease environmental stimuli, including noise, as appropriate during the night  - Encourage the patient to actively participate in unit groups and or exercise during the day to enhance ability to achieve adequate sleep at night  - Assess the patient, in the morning, encouraging a description of sleep experience  Outcome: Progressing     Problem: DISCHARGE PLANNING  Goal: Discharge to home or other facility with appropriate resources  Description: INTERVENTIONS:  - Identify barriers to discharge w/patient and caregiver  - Arrange for needed discharge resources and transportation as appropriate  - Identify discharge learning needs (meds, wound care, etc.)  - Arrange for interpretive services to assist at discharge as needed  - Refer to Case Management Department for coordinating discharge planning if the patient needs post-hospital services based on physician/advanced practitioner order or complex needs related to functional status, cognitive ability, or social support system  Outcome: Progressing     Problem: Ineffective Coping  Goal: Participates in unit activities  Description: Interventions:  - Provide therapeutic environment   - Provide required programming   - Redirect inappropriate behaviors   Outcome: Progressing     Problem: SAFETY ADULT  Goal: Patient will remain free of falls  Description: INTERVENTIONS:  - Educate patient/family on patient safety including physical limitations  - Instruct patient to call for assistance with activity   - Consult OT/PT to assist with strengthening/mobility   - Keep Call bell within reach  - Keep bed low and locked with side rails adjusted as appropriate  - Keep care items and personal belongings within reach  - Initiate and maintain comfort rounds  - Make Fall Risk Sign visible to staff  - Offer Toileting every Hours, in advance of need  - Initiate/Maintain alarm  - Obtain necessary fall risk management equipment:  - Apply yellow socks and bracelet for high fall risk patients  - Consider moving patient to room near nurses station  Outcome: Progressing

## 2023-06-26 PROCEDURE — 99232 SBSQ HOSP IP/OBS MODERATE 35: CPT | Performed by: STUDENT IN AN ORGANIZED HEALTH CARE EDUCATION/TRAINING PROGRAM

## 2023-06-26 RX ORDER — GABAPENTIN 100 MG/1
100 CAPSULE ORAL 3 TIMES DAILY
Status: DISCONTINUED | OUTPATIENT
Start: 2023-06-26 | End: 2023-06-30 | Stop reason: HOSPADM

## 2023-06-26 RX ADMIN — FLUOXETINE 20 MG: 20 CAPSULE ORAL at 09:00

## 2023-06-26 RX ADMIN — HYDROXYZINE HYDROCHLORIDE 100 MG: 50 TABLET, FILM COATED ORAL at 00:05

## 2023-06-26 RX ADMIN — NICOTINE POLACRILEX 4 MG: 4 GUM, CHEWING BUCCAL at 16:55

## 2023-06-26 RX ADMIN — LIDOCAINE 1 PATCH: 50 PATCH TOPICAL at 09:00

## 2023-06-26 RX ADMIN — ACETAMINOPHEN 975 MG: 325 TABLET, FILM COATED ORAL at 09:50

## 2023-06-26 RX ADMIN — TRAZODONE HYDROCHLORIDE 50 MG: 50 TABLET ORAL at 21:42

## 2023-06-26 RX ADMIN — RISPERIDONE 1 MG: 1 TABLET ORAL at 09:01

## 2023-06-26 RX ADMIN — THIAMINE HCL TAB 100 MG 100 MG: 100 TAB at 09:01

## 2023-06-26 RX ADMIN — FOLIC ACID 1 MG: 1 TABLET ORAL at 09:01

## 2023-06-26 RX ADMIN — RISPERIDONE 2 MG: 2 TABLET ORAL at 21:42

## 2023-06-26 RX ADMIN — BENZTROPINE MESYLATE 0.5 MG: 0.5 TABLET ORAL at 09:01

## 2023-06-26 RX ADMIN — BENZTROPINE MESYLATE 0.5 MG: 0.5 TABLET ORAL at 21:42

## 2023-06-26 RX ADMIN — Medication 1 TABLET: at 09:01

## 2023-06-26 RX ADMIN — GABAPENTIN 100 MG: 100 CAPSULE ORAL at 15:52

## 2023-06-26 RX ADMIN — GABAPENTIN 100 MG: 100 CAPSULE ORAL at 21:42

## 2023-06-26 NOTE — PLAN OF CARE
Problem: PSYCHOSIS  Goal: Will report no hallucinations or delusions  Description: Interventions:  - Administer medication as  ordered  - Every waking shifts and PRN assess for the presence of hallucinations and or delusions  - Assist with reality testing to support increasing orientation  - Assess if patient's hallucinations or delusions are encouraging self-harm or harm to others and intervene as appropriate  Outcome: Progressing     Problem: SLEEP DISTURBANCE  Goal: Will exhibit normal sleeping pattern  Description: Interventions:  -  Assess the patients sleep pattern, noting recent changes  - Administer medication as ordered  - Decrease environmental stimuli, including noise, as appropriate during the night  - Encourage the patient to actively participate in unit groups and or exercise during the day to enhance ability to achieve adequate sleep at night  - Assess the patient, in the morning, encouraging a description of sleep experience  Outcome: Progressing     Problem: Ineffective Coping  Goal: Participates in unit activities  Description: Interventions:  - Provide therapeutic environment   - Provide required programming   - Redirect inappropriate behaviors   Outcome: Progressing

## 2023-06-26 NOTE — NURSING NOTE
Bedside  - 3 t-shirts  - 3 socks  - 3 underwear  - 2 Shorts  - Deoderant  - Brush  - Slides    Bin  - 2 tank tops  - 8 T-shirts  - 5 socks  - 5 underwear  - 3 microfiber wash towels

## 2023-06-26 NOTE — NURSING NOTE
Patient noted to be awake once overnight. Received PRN medication for anxiety (see previous note) and returned to sleep. Maintained on Q7 minute checks.

## 2023-06-26 NOTE — NURSING NOTE
Patient requests and receives Atarax 100 MG PO PRN for severe anxiety at 00:04. Edwards score: 30. Medication reassessment at 01:04: Patient sleeping upon reassessment.

## 2023-06-26 NOTE — PROGRESS NOTES
[facility] Group Note     06/26/23 1000 06/26/23 1045   Activity/Group Checklist   Group Community meeting  (Goals/Positive Affirmations) Life Skills  (Social Anxiety - Safety Behaviors and Shared Experiences)   Attendance Attended Attended   Attendance Duration (min) 16-30 31-45   Interactions Interacted appropriately Interacted appropriately   Affect/Mood Appropriate;Calm Appropriate;Calm   Goals Achieved Identified feelings; Identified triggers; Discussed coping strategies; Able to listen to others; Able to engage in interactions; Able to reflect/comment on own behavior;Able to self-disclose; Able to recieve feedback; Able to give feedback to another Identified feelings; Identified triggers; Discussed coping strategies; Displayed empathy;Able to listen to others; Able to engage in interactions; Able to reflect/comment on own behavior;Able to self-disclose; Able to recieve feedback; Able to give feedback to another

## 2023-06-26 NOTE — NURSING NOTE
Pt pleasant and cooperative during interaction. Walking the halls at times. Denies SI/HI or hallucinations. Reports anxiety increasing r/t noisy milieu. Able to talk with staff. Pt waiting on call from a person named Rani Rojas. Hopeful for the future. Denies any question or concern at this time.

## 2023-06-26 NOTE — PROGRESS NOTES
Progress Note - [id number] [address] Avharini E 26 y.o. male MRN: [id number]  Unit/Bed#: [facility] 215-02 Encounter: [id number]    Assessment/Plan   Principal Problem:    Recurrent major depressive disorder ([address])  Active Problems:    Medical clearance for psychiatric admission    Tobacco abuse    Class 1 obesity in adult    Alcohol use      Subjective: Patient was seen, chart was reviewed, and case was discussed with the team. Patient appears walking hallways, guarded and superficial. He endorses fluctuating anxiety and paranoia. Mood is improving. Sleep is ok. He is compliant with medications. Denies any side effects.    Behavior over the last 24 hours:  improved, unchanged  Sleep: normal  Appetite: normal  Medication side effects: No    Medical ROS: Pertinent items are noted in HPI.all other systems are negative    Current Medications:  Current Facility-Administered Medications   Medication Dose Route Frequency   • acetaminophen (TYLENOL) tablet 650 mg  650 mg Oral Q6H PRN   • acetaminophen (TYLENOL) tablet 650 mg  650 mg Oral Q4H PRN   • acetaminophen (TYLENOL) tablet 975 mg  975 mg Oral Q6H PRN   • aluminum-magnesium hydroxide-simethicone (MYLANTA) oral suspension 30 mL  30 mL Oral Q4H PRN   • haloperidol lactate (HALDOL) injection 2.5 mg  2.5 mg Intramuscular Q4H PRN Max 4/day    And   • LORazepam (ATIVAN) injection 1 mg  1 mg Intramuscular Q4H PRN Max 4/day    And   • benztropine (COGENTIN) injection 0.5 mg  0.5 mg Intramuscular Q4H PRN Max 4/day   • haloperidol lactate (HALDOL) injection 5 mg  5 mg Intramuscular Q4H PRN Max 4/day    And   • LORazepam (ATIVAN) injection 2 mg  2 mg Intramuscular Q4H PRN Max 4/day    And   • benztropine (COGENTIN) injection 1 mg  1 mg Intramuscular Q4H PRN Max 4/day   • benztropine (COGENTIN) tablet 0.5 mg  0.5 mg Oral HS   • benztropine (COGENTIN) tablet 0.5 mg  0.5 mg Oral Daily   • benztropine (COGENTIN) tablet 1 mg  1 mg Oral Q4H PRN Max 6/day   • bisacodyl (DULCOLAX) rectal suppository 10 mg  10 mg Rectal Daily PRN   • hydrOXYzine HCL (ATARAX) tablet 50 mg  50 mg Oral Q6H PRN Max 4/day    Or   • diphenhydrAMINE (BENADRYL) injection 50 mg  50 mg Intramuscular Q6H PRN   • FLUoxetine (PROzac) capsule 20 mg  20 mg Oral Daily   • folic acid (FOLVITE) tablet 1 mg  1 mg Oral Daily   • haloperidol (HALDOL) tablet 1 mg  1 mg Oral Q6H PRN   • haloperidol (HALDOL) tablet 2.5 mg  2.5 mg Oral Q4H PRN Max 4/day   • haloperidol (HALDOL) tablet 5 mg  5 mg Oral Q4H PRN Max 4/day   • hydrOXYzine HCL (ATARAX) tablet 100 mg  100 mg Oral Q6H PRN Max 4/day    Or   • LORazepam (ATIVAN) injection 2 mg  2 mg Intramuscular Q6H PRN   • hydrOXYzine HCL (ATARAX) tablet 25 mg  25 mg Oral Q6H PRN Max 4/day   • lidocaine (LIDODERM) 5 % patch 1 patch  1 patch Topical Daily   • LORazepam (ATIVAN) tablet 2 mg  2 mg Oral Q4H PRN   • multivitamin-minerals (CENTRUM) tablet 1 tablet  1 tablet Oral Daily   • nicotine polacrilex (NICORETTE) gum 4 mg  4 mg Oral Q2H PRN   • polyethylene glycol (MIRALAX) packet 17 g  17 g Oral Daily PRN   • risperiDONE (RisperDAL) tablet 1 mg  1 mg Oral Daily   • risperiDONE (RisperDAL) tablet 2 mg  2 mg Oral HS   • senna-docusate sodium (SENOKOT S) 8.6-50 mg per tablet 1 tablet  1 tablet Oral Daily PRN   • thiamine tablet 100 mg  100 mg Oral Daily   • traZODone (DESYREL) tablet 50 mg  50 mg Oral HS PRN   • traZODone (DESYREL) tablet 50 mg  50 mg Oral HS       Behavioral Health Medications:   all current active meds have been reviewed. Vitals:  Vitals:    06/26/23 0757   BP: 107/68   Pulse: 72   Resp: 15   Temp: 97.6 °F (36.4 °C)   SpO2:        Laboratory results:    I have personally reviewed all pertinent laboratory/tests results.     Mental Status Evaluation:    Appearance:  age appropriate   Behavior:  guarded   Speech:  soft   Mood:  anxious   Affect:  constricted and mood-congruent   Thought Process:  goal directed and logical   Thought Content:  paranoia   Perceptual Disturbances: None   Risk Potential: Suicidal Ideations none  Homicidal Ideations none  Potential for Aggression No   Sensorium:  person, place and time/date   Memory:  recent and remote memory grossly intact   Consciousness:  alert and awake    Attention: attention span appeared shorter than expected for age   Insight:  limited   Judgment: limited   Gait/Station: normal gait/station   Motor Activity: no abnormal movements       Progress Toward Goals: Progressing    Recommended Treatment: Continue with pharmacotherapy, group therapy, milieu therapy and occupational therapy. 1.Start gabapentin 100 mg TID  Risks, benefits and possible side effects of Medications:   Risks, benefits, alternatives, and possible side effects of patient's psychiatric medications were discussed with patient.

## 2023-06-26 NOTE — NURSING NOTE
Sonal Bedoya is calm upon approach, visualized resting in bed and napping late this afternoon. Patient is pleasant in conversation, though somewhat scant. Sonal Bedoya reports, "I am really tired today, but I'm okay." Patient denies current SI/HI/AH/VH. Sonal Bedoya reports intermittent bouts of anxiety, prefers to be alone when feeling anxious. Coping skill educated provided to patient, who appears receptive. Sonal Bedoya was encouraged to seek staff with any questions and/or concerns, verbalized understanding.

## 2023-06-27 PROCEDURE — 99232 SBSQ HOSP IP/OBS MODERATE 35: CPT | Performed by: STUDENT IN AN ORGANIZED HEALTH CARE EDUCATION/TRAINING PROGRAM

## 2023-06-27 RX ADMIN — NICOTINE POLACRILEX 4 MG: 4 GUM, CHEWING BUCCAL at 12:12

## 2023-06-27 RX ADMIN — FOLIC ACID 1 MG: 1 TABLET ORAL at 08:08

## 2023-06-27 RX ADMIN — BENZTROPINE MESYLATE 0.5 MG: 0.5 TABLET ORAL at 21:18

## 2023-06-27 RX ADMIN — BENZTROPINE MESYLATE 0.5 MG: 0.5 TABLET ORAL at 08:08

## 2023-06-27 RX ADMIN — GABAPENTIN 100 MG: 100 CAPSULE ORAL at 15:23

## 2023-06-27 RX ADMIN — GABAPENTIN 100 MG: 100 CAPSULE ORAL at 21:18

## 2023-06-27 RX ADMIN — TRAZODONE HYDROCHLORIDE 50 MG: 50 TABLET ORAL at 21:18

## 2023-06-27 RX ADMIN — NICOTINE POLACRILEX 4 MG: 4 GUM, CHEWING BUCCAL at 17:27

## 2023-06-27 RX ADMIN — GABAPENTIN 100 MG: 100 CAPSULE ORAL at 08:09

## 2023-06-27 RX ADMIN — NICOTINE POLACRILEX 4 MG: 4 GUM, CHEWING BUCCAL at 20:24

## 2023-06-27 RX ADMIN — Medication 1 TABLET: at 08:09

## 2023-06-27 RX ADMIN — RISPERIDONE 1 MG: 1 TABLET ORAL at 08:08

## 2023-06-27 RX ADMIN — RISPERIDONE 2 MG: 2 TABLET ORAL at 21:18

## 2023-06-27 RX ADMIN — NICOTINE POLACRILEX 4 MG: 4 GUM, CHEWING BUCCAL at 15:23

## 2023-06-27 RX ADMIN — FLUOXETINE 20 MG: 20 CAPSULE ORAL at 08:08

## 2023-06-27 RX ADMIN — THIAMINE HCL TAB 100 MG 100 MG: 100 TAB at 08:08

## 2023-06-27 NOTE — PROGRESS NOTES
Pt seclusive to room. Out for meals and medications. Reported he has anxiety when the unit is loud. Not other concerns. DC: end of the week      06/27/23 4725   Team Meeting   Meeting Type Daily Rounds   Team Members Present   Team Members Present Physician;Nurse;; Other (Discipline and Name)   Physician Team Member Dr. Mariano Winter / Dr. Cathryne Bumpers / Shlomo Roque / Jose R Cueva Team Member [facility] Management Team Member Oswaldo Simeon / Bibi Byers   Other (Discipline and Name) Gaston Ly - Art Therapy   Patient/Family Present   Patient Present No   Patient's Family Present No

## 2023-06-27 NOTE — PROGRESS NOTES
[facility] Group Note     06/27/23 1000   Activity/Group Checklist   Group Self Esteem  (Self-Awareness - I Am Someone Who…)   Attendance Attended   Attendance Duration (min) 31-45   Interactions Interacted appropriately  (but reserved at times)   Affect/Mood Appropriate;Calm   Goals Achieved Identified feelings; Discussed coping strategies; Able to listen to others; Able to engage in interactions; Able to reflect/comment on own behavior;Able to recieve feedback; Able to give feedback to another

## 2023-06-27 NOTE — NURSING NOTE
Patient appeared to sleep well throughout the night. No signs of distress noted. Q7 minute checks maintained.

## 2023-06-27 NOTE — NURSING NOTE
Patient pleasant denies SI HI AVH at present, patient is attending groups.  Patient plans to be compliant with medications after discharge

## 2023-06-27 NOTE — PROGRESS NOTES
Progress Note - [address] 26 y.o. male MRN: [id number]  Unit/Bed#: [facility] 215-02 Encounter: [id number]    Assessment/Plan   Principal Problem:    Recurrent major depressive disorder ([address])  Active Problems:    Medical clearance for psychiatric admission    Tobacco abuse    Class 1 obesity in adult    Alcohol use      Subjective: Patient was seen, chart was reviewed, and case was discussed with the team. As per report patient received PRN medications for anxiety. Patient endorses fluctuating anxiety. Mood is improving. Sleep and appetite are ok. He is compliant with medications. Denies any side effects.    Behavior over the last 24 hours:  improved  Sleep: normal  Appetite: normal  Medication side effects: No    Medical ROS: Pertinent items are noted in HPI.all other systems are negative    Current Medications:  Current Facility-Administered Medications   Medication Dose Route Frequency   • acetaminophen (TYLENOL) tablet 650 mg  650 mg Oral Q6H PRN   • acetaminophen (TYLENOL) tablet 650 mg  650 mg Oral Q4H PRN   • acetaminophen (TYLENOL) tablet 975 mg  975 mg Oral Q6H PRN   • aluminum-magnesium hydroxide-simethicone (MYLANTA) oral suspension 30 mL  30 mL Oral Q4H PRN   • haloperidol lactate (HALDOL) injection 2.5 mg  2.5 mg Intramuscular Q4H PRN Max 4/day    And   • LORazepam (ATIVAN) injection 1 mg  1 mg Intramuscular Q4H PRN Max 4/day    And   • benztropine (COGENTIN) injection 0.5 mg  0.5 mg Intramuscular Q4H PRN Max 4/day   • haloperidol lactate (HALDOL) injection 5 mg  5 mg Intramuscular Q4H PRN Max 4/day    And   • LORazepam (ATIVAN) injection 2 mg  2 mg Intramuscular Q4H PRN Max 4/day    And   • benztropine (COGENTIN) injection 1 mg  1 mg Intramuscular Q4H PRN Max 4/day   • benztropine (COGENTIN) tablet 0.5 mg  0.5 mg Oral HS   • benztropine (COGENTIN) tablet 0.5 mg  0.5 mg Oral Daily   • benztropine (COGENTIN) tablet 1 mg  1 mg Oral Q4H PRN Max 6/day   • bisacodyl (DULCOLAX) rectal suppository 10 mg  10 mg Rectal Daily PRN   • hydrOXYzine HCL (ATARAX) tablet 50 mg  50 mg Oral Q6H PRN Max 4/day    Or   • diphenhydrAMINE (BENADRYL) injection 50 mg  50 mg Intramuscular Q6H PRN   • FLUoxetine (PROzac) capsule 20 mg  20 mg Oral Daily   • folic acid (FOLVITE) tablet 1 mg  1 mg Oral Daily   • gabapentin (NEURONTIN) capsule 100 mg  100 mg Oral TID   • haloperidol (HALDOL) tablet 1 mg  1 mg Oral Q6H PRN   • haloperidol (HALDOL) tablet 2.5 mg  2.5 mg Oral Q4H PRN Max 4/day   • haloperidol (HALDOL) tablet 5 mg  5 mg Oral Q4H PRN Max 4/day   • hydrOXYzine HCL (ATARAX) tablet 100 mg  100 mg Oral Q6H PRN Max 4/day    Or   • LORazepam (ATIVAN) injection 2 mg  2 mg Intramuscular Q6H PRN   • hydrOXYzine HCL (ATARAX) tablet 25 mg  25 mg Oral Q6H PRN Max 4/day   • lidocaine (LIDODERM) 5 % patch 1 patch  1 patch Topical Daily   • LORazepam (ATIVAN) tablet 2 mg  2 mg Oral Q4H PRN   • multivitamin-minerals (CENTRUM) tablet 1 tablet  1 tablet Oral Daily   • nicotine polacrilex (NICORETTE) gum 4 mg  4 mg Oral Q2H PRN   • polyethylene glycol (MIRALAX) packet 17 g  17 g Oral Daily PRN   • risperiDONE (RisperDAL) tablet 1 mg  1 mg Oral Daily   • risperiDONE (RisperDAL) tablet 2 mg  2 mg Oral HS   • senna-docusate sodium (SENOKOT S) 8.6-50 mg per tablet 1 tablet  1 tablet Oral Daily PRN   • thiamine tablet 100 mg  100 mg Oral Daily   • traZODone (DESYREL) tablet 50 mg  50 mg Oral HS PRN   • traZODone (DESYREL) tablet 50 mg  50 mg Oral HS       Behavioral Health Medications:   all current active meds have been reviewed. Vitals:  Vitals:    06/27/23 1514   BP: 138/85   Pulse: 79   Resp: 17   Temp: 98.7 °F (37.1 °C)   SpO2: 98%       Laboratory results:    I have personally reviewed all pertinent laboratory/tests results.     Mental Status Evaluation:    Appearance:  age appropriate   Behavior:  guarded   Speech:  soft   Mood:  anxious and depressed   Affect:  constricted   Thought Process:  slow   Thought Content: paranoia   Perceptual Disturbances: None   Risk Potential: Suicidal Ideations none  Homicidal Ideations none  Potential for Aggression No   Sensorium:  person, place and time/date   Memory:  recent and remote memory grossly intact   Consciousness:  alert and awake    Attention: attention span appeared shorter than expected for age   Insight:  limited   Judgment: limited   Gait/Station: normal gait/station   Motor Activity: no abnormal movements       Progress Toward Goals: Progressing    Recommended Treatment: Continue with pharmacotherapy, group therapy, milieu therapy and occupational therapy. Risks, benefits and possible side effects of Medications:   Risks, benefits, alternatives, and possible side effects of patient's psychiatric medications were discussed with patient.

## 2023-06-27 NOTE — TREATMENT TEAM
06/27/23 1230   Activity/Group Checklist   Group Other (Comment)  (art therapy)   Attendance Attended   Attendance Duration (min) 46-60   Interactions Interacted appropriately   Affect/Mood Appropriate;Calm   Goals Achieved Able to listen to others; Able to engage in interactions; Other (Comment)  (authentic, spontaneous self expression, connection, and insight)

## 2023-06-28 PROCEDURE — 99232 SBSQ HOSP IP/OBS MODERATE 35: CPT | Performed by: STUDENT IN AN ORGANIZED HEALTH CARE EDUCATION/TRAINING PROGRAM

## 2023-06-28 RX ADMIN — RISPERIDONE 1 MG: 1 TABLET ORAL at 08:48

## 2023-06-28 RX ADMIN — LIDOCAINE 1 PATCH: 50 PATCH TOPICAL at 08:47

## 2023-06-28 RX ADMIN — GABAPENTIN 100 MG: 100 CAPSULE ORAL at 08:48

## 2023-06-28 RX ADMIN — NICOTINE POLACRILEX 4 MG: 4 GUM, CHEWING BUCCAL at 21:27

## 2023-06-28 RX ADMIN — Medication 1 TABLET: at 08:48

## 2023-06-28 RX ADMIN — RISPERIDONE 2 MG: 2 TABLET ORAL at 21:16

## 2023-06-28 RX ADMIN — BENZTROPINE MESYLATE 0.5 MG: 0.5 TABLET ORAL at 21:16

## 2023-06-28 RX ADMIN — THIAMINE HCL TAB 100 MG 100 MG: 100 TAB at 08:48

## 2023-06-28 RX ADMIN — BENZTROPINE MESYLATE 0.5 MG: 0.5 TABLET ORAL at 08:48

## 2023-06-28 RX ADMIN — FLUOXETINE 20 MG: 20 CAPSULE ORAL at 08:48

## 2023-06-28 RX ADMIN — NICOTINE POLACRILEX 4 MG: 4 GUM, CHEWING BUCCAL at 17:03

## 2023-06-28 RX ADMIN — FOLIC ACID 1 MG: 1 TABLET ORAL at 08:48

## 2023-06-28 RX ADMIN — GABAPENTIN 100 MG: 100 CAPSULE ORAL at 21:16

## 2023-06-28 RX ADMIN — GABAPENTIN 100 MG: 100 CAPSULE ORAL at 17:00

## 2023-06-28 RX ADMIN — TRAZODONE HYDROCHLORIDE 50 MG: 50 TABLET ORAL at 21:16

## 2023-06-28 NOTE — PROGRESS NOTES
Progress Note - [address] 26 y.o. male MRN: [id number]  Unit/Bed#: [facility] 215-02 Encounter: [id number]    Assessment/Plan   Principal Problem:    Recurrent major depressive disorder ([address])  Active Problems:    Medical clearance for psychiatric admission    Tobacco abuse    Class 1 obesity in adult    Alcohol use      Subjective: Patient was seen, chart was reviewed, and case was discussed with the team. Patient appears calm and cooperative. Paranoia has diminished. Reports improvement in mood and anxiety is manageable. Sleep and appetite has improved. He is compliant with medications. Denies any side effects.     Behavior over the last 24 hours:  improved  Sleep: normal  Appetite: normal  Medication side effects: No    Medical ROS: Pertinent items are noted in HPI.all other systems are negative    Current Medications:  Current Facility-Administered Medications   Medication Dose Route Frequency   • acetaminophen (TYLENOL) tablet 650 mg  650 mg Oral Q6H PRN   • acetaminophen (TYLENOL) tablet 650 mg  650 mg Oral Q4H PRN   • acetaminophen (TYLENOL) tablet 975 mg  975 mg Oral Q6H PRN   • aluminum-magnesium hydroxide-simethicone (MYLANTA) oral suspension 30 mL  30 mL Oral Q4H PRN   • haloperidol lactate (HALDOL) injection 2.5 mg  2.5 mg Intramuscular Q4H PRN Max 4/day    And   • LORazepam (ATIVAN) injection 1 mg  1 mg Intramuscular Q4H PRN Max 4/day    And   • benztropine (COGENTIN) injection 0.5 mg  0.5 mg Intramuscular Q4H PRN Max 4/day   • haloperidol lactate (HALDOL) injection 5 mg  5 mg Intramuscular Q4H PRN Max 4/day    And   • LORazepam (ATIVAN) injection 2 mg  2 mg Intramuscular Q4H PRN Max 4/day    And   • benztropine (COGENTIN) injection 1 mg  1 mg Intramuscular Q4H PRN Max 4/day   • benztropine (COGENTIN) tablet 0.5 mg  0.5 mg Oral HS   • benztropine (COGENTIN) tablet 0.5 mg  0.5 mg Oral Daily   • benztropine (COGENTIN) tablet 1 mg  1 mg Oral Q4H PRN Max 6/day   • bisacodyl (DULCOLAX) rectal suppository 10 mg  10 mg Rectal Daily PRN   • hydrOXYzine HCL (ATARAX) tablet 50 mg  50 mg Oral Q6H PRN Max 4/day    Or   • diphenhydrAMINE (BENADRYL) injection 50 mg  50 mg Intramuscular Q6H PRN   • FLUoxetine (PROzac) capsule 20 mg  20 mg Oral Daily   • folic acid (FOLVITE) tablet 1 mg  1 mg Oral Daily   • gabapentin (NEURONTIN) capsule 100 mg  100 mg Oral TID   • haloperidol (HALDOL) tablet 1 mg  1 mg Oral Q6H PRN   • haloperidol (HALDOL) tablet 2.5 mg  2.5 mg Oral Q4H PRN Max 4/day   • haloperidol (HALDOL) tablet 5 mg  5 mg Oral Q4H PRN Max 4/day   • hydrOXYzine HCL (ATARAX) tablet 100 mg  100 mg Oral Q6H PRN Max 4/day    Or   • LORazepam (ATIVAN) injection 2 mg  2 mg Intramuscular Q6H PRN   • hydrOXYzine HCL (ATARAX) tablet 25 mg  25 mg Oral Q6H PRN Max 4/day   • lidocaine (LIDODERM) 5 % patch 1 patch  1 patch Topical Daily   • LORazepam (ATIVAN) tablet 2 mg  2 mg Oral Q4H PRN   • multivitamin-minerals (CENTRUM) tablet 1 tablet  1 tablet Oral Daily   • nicotine polacrilex (NICORETTE) gum 4 mg  4 mg Oral Q2H PRN   • polyethylene glycol (MIRALAX) packet 17 g  17 g Oral Daily PRN   • risperiDONE (RisperDAL) tablet 1 mg  1 mg Oral Daily   • risperiDONE (RisperDAL) tablet 2 mg  2 mg Oral HS   • senna-docusate sodium (SENOKOT S) 8.6-50 mg per tablet 1 tablet  1 tablet Oral Daily PRN   • thiamine tablet 100 mg  100 mg Oral Daily   • traZODone (DESYREL) tablet 50 mg  50 mg Oral HS PRN   • traZODone (DESYREL) tablet 50 mg  50 mg Oral HS       Behavioral Health Medications:   all current active meds have been reviewed. Vitals:  Vitals:    06/28/23 0729   BP: 109/65   Pulse: 75   Resp: 18   Temp: 98 °F (36.7 °C)   SpO2: 99%       Laboratory results:    I have personally reviewed all pertinent laboratory/tests results.     Mental Status Evaluation:    Appearance:  age appropriate   Behavior:  cooperative   Speech:  normal pitch and normal volume   Mood:  "ok"   Affect:  mood-congruent   Thought Process: concrete, goal directed and logical   Thought Content:  paranoia   Perceptual Disturbances: None   Risk Potential: Suicidal Ideations none  Homicidal Ideations none  Potential for Aggression No   Sensorium:  person, place and time/date   Memory:  recent and remote memory grossly intact   Consciousness:  alert and awake    Attention: attention span appeared shorter than expected for age   Insight:  limited   Judgment: limited   Gait/Station: normal gait/station   Motor Activity: no abnormal movements       Progress Toward Goals: Progressing    Recommended Treatment: Continue with pharmacotherapy, group therapy, milieu therapy and occupational therapy. Risks, benefits and possible side effects of Medications:   Risks, benefits, alternatives, and possible side effects of patient's psychiatric medications were discussed with patient.

## 2023-06-28 NOTE — NURSING NOTE
Pt reports improved mood. Pt appears brighter during interaction. Social and pleasant. Waiting on a call from grandma. Denies SI/HI or hallucinations. Denies any question or concern at this time.

## 2023-06-28 NOTE — PLAN OF CARE
Problem: PSYCHOSIS  Goal: Will report no hallucinations or delusions  Description: Interventions:  - Administer medication as  ordered  - Every waking shifts and PRN assess for the presence of hallucinations and or delusions  - Assist with reality testing to support increasing orientation  - Assess if patient's hallucinations or delusions are encouraging self-harm or harm to others and intervene as appropriate  6/28/2023 1625 by Gisella Lynne RN  Outcome: Progressing  6/28/2023 1625 by Gisella Lynne RN  Outcome: Progressing     Problem: SUBSTANCE USE/ABUSE  Goal: Will have no detox symptoms and will verbalize plan for changing substance-related behavior  Description: INTERVENTIONS:  - Monitor physical status and assess for symptoms of withdrawal  - Administer medication as ordered  - Provide emotional support with 1 on 1 interaction with staff  - Encourage recovery focused program/ addiction education  - Assess for verbalization of changing behaviors related to substance abuse  - Initiate consults and referrals as appropriate (Case Management, Spiritual Care, etc.)  6/28/2023 1625 by Gisella Lynne RN  Outcome: Progressing  6/28/2023 1625 by Gisella Lynne RN  Outcome: Progressing  Goal: By discharge, will develop insight into their chemical dependency and sustain motivation to continue in recovery  Description: INTERVENTIONS:  - Attends all daily group sessions and scheduled AA groups  - Actively practices coping skills through participation in the therapeutic community and adherence to program rules  - Reviews and completes assignments from individual treatment plan  - Assist patient development of understanding of their personal cycle of addiction and relapse triggers  6/28/2023 1625 by Gisella Lynne RN  Outcome: Progressing  6/28/2023 1625 by Gisella Lynne RN  Outcome: Progressing  Goal: By discharge, patient will have ongoing treatment plan addressing chemical dependency  Description: INTERVENTIONS:  - Assist patient with resources and/or appointments for ongoing recovery based living  6/28/2023 1625 by Surya Hart RN  Outcome: Progressing  6/28/2023 1625 by Surya Hart RN  Outcome: Progressing     Problem: SLEEP DISTURBANCE  Goal: Will exhibit normal sleeping pattern  Description: Interventions:  -  Assess the patients sleep pattern, noting recent changes  - Administer medication as ordered  - Decrease environmental stimuli, including noise, as appropriate during the night  - Encourage the patient to actively participate in unit groups and or exercise during the day to enhance ability to achieve adequate sleep at night  - Assess the patient, in the morning, encouraging a description of sleep experience  6/28/2023 1625 by Surya Hart RN  Outcome: Progressing  6/28/2023 1625 by Surya Hart RN  Outcome: Progressing     Problem: SAFETY ADULT  Goal: Patient will remain free of falls  Description: INTERVENTIONS:  - Educate patient/family on patient safety including physical limitations  - Instruct patient to call for assistance with activity   - Consult OT/PT to assist with strengthening/mobility   - Keep Call bell within reach  - Keep bed low and locked with side rails adjusted as appropriate  - Keep care items and personal belongings within reach  - Initiate and maintain comfort rounds  - Make Fall Risk Sign visible to staff  - Offer Toileting every Hours, in advance of need  - Initiate/Maintain alarm  - Obtain necessary fall risk management equipment:   - Apply yellow socks and bracelet for high fall risk patients  - Consider moving patient to room near nurses station  6/28/2023 1625 by Surya Hart RN  Outcome: Progressing  6/28/2023 1625 by Surya Hart RN  Outcome: Progressing

## 2023-06-28 NOTE — TREATMENT TEAM
06/28/23 1000   Activity/Group Checklist   Group Exercise   Attendance Attended   Attendance Duration (min) 16-30   Interactions Interacted appropriately   Affect/Mood Appropriate   Goals Achieved Able to listen to others; Able to engage in interactions; Other (Comment)  (walked laps on unit with peers and this therapist)

## 2023-06-28 NOTE — NURSING NOTE
Met with patient in his room, pleasant and cooperative, currently denies HI, SI, AVH, Depression and Anxiety. Verbalize readiness for discharge. Stated he had a good day today and that he sleeps well at nights.

## 2023-06-28 NOTE — NURSING NOTE
Patient appeared to have slept throughout the night without difficulty. All safety precautions maintained.

## 2023-06-28 NOTE — PROGRESS NOTES
Status: Pt seclusive to himself & reported fluctuating anxiety. Pt is cooperative & denied any SI/HI or hallucinations. Pt attended a few groups & appeared to have slept overnight. Medication: no changes / no PRNs  D/C: Unknown      06/28/23 0750   Team Meeting   Meeting Type Daily Rounds   Team Members Present   Team Members Present Physician;Nurse; Other (Discipline and Name);    Physician Team Member Dr. Isha Mckeon / Dr. Paola David / Danny Gonzales Team Member Regina Tovar / Duke Yun / Alexia Alcantara Management Team Member Deandra Gonzalez / Rhonda Tabor / Dior Ng   Other (Discipline and Name) Rajan(art therapy)   Patient/Family Present   Patient Present No   Patient's Family Present No

## 2023-06-28 NOTE — TREATMENT TEAM
06/28/23 1030   Activity/Group Checklist   Group Other (Comment)  (art therapy)   Attendance Attended   Attendance Duration (min) 31-45   Interactions Interacted appropriately   Affect/Mood Appropriate   Goals Achieved Able to listen to others; Able to engage in interactions; Other (Comment)  (authentic, spontaneous self expression, connection, and insight)

## 2023-06-28 NOTE — PLAN OF CARE
Problem: SUBSTANCE USE/ABUSE  Goal: Will have no detox symptoms and will verbalize plan for changing substance-related behavior  Description: INTERVENTIONS:  - Monitor physical status and assess for symptoms of withdrawal  - Administer medication as ordered  - Provide emotional support with 1 on 1 interaction with staff  - Encourage recovery focused program/ addiction education  - Assess for verbalization of changing behaviors related to substance abuse  - Initiate consults and referrals as appropriate (Case Management, Spiritual Care, etc.)  Outcome: Progressing  Goal: By discharge, will develop insight into their chemical dependency and sustain motivation to continue in recovery  Description: INTERVENTIONS:  - Attends all daily group sessions and scheduled AA groups  - Actively practices coping skills through participation in the therapeutic community and adherence to program rules  - Reviews and completes assignments from individual treatment plan  - Assist patient development of understanding of their personal cycle of addiction and relapse triggers  Outcome: Progressing  Goal: By discharge, patient will have ongoing treatment plan addressing chemical dependency  Description: INTERVENTIONS:  - Assist patient with resources and/or appointments for ongoing recovery based living  Outcome: Progressing     Problem: PSYCHOSIS  Goal: Will report no hallucinations or delusions  Description: Interventions:  - Administer medication as  ordered  - Every waking shifts and PRN assess for the presence of hallucinations and or delusions  - Assist with reality testing to support increasing orientation  - Assess if patient's hallucinations or delusions are encouraging self-harm or harm to others and intervene as appropriate  Outcome: Progressing

## 2023-06-29 PROBLEM — Z00.8 MEDICAL CLEARANCE FOR PSYCHIATRIC ADMISSION: Status: RESOLVED | Noted: 2021-01-08 | Resolved: 2023-06-29

## 2023-06-29 PROCEDURE — 99232 SBSQ HOSP IP/OBS MODERATE 35: CPT | Performed by: STUDENT IN AN ORGANIZED HEALTH CARE EDUCATION/TRAINING PROGRAM

## 2023-06-29 RX ADMIN — BENZTROPINE MESYLATE 0.5 MG: 0.5 TABLET ORAL at 08:10

## 2023-06-29 RX ADMIN — TRAZODONE HYDROCHLORIDE 50 MG: 50 TABLET ORAL at 21:17

## 2023-06-29 RX ADMIN — THIAMINE HCL TAB 100 MG 100 MG: 100 TAB at 08:10

## 2023-06-29 RX ADMIN — GABAPENTIN 100 MG: 100 CAPSULE ORAL at 16:00

## 2023-06-29 RX ADMIN — BENZTROPINE MESYLATE 0.5 MG: 0.5 TABLET ORAL at 21:17

## 2023-06-29 RX ADMIN — FLUOXETINE 20 MG: 20 CAPSULE ORAL at 08:10

## 2023-06-29 RX ADMIN — RISPERIDONE 1 MG: 1 TABLET ORAL at 08:09

## 2023-06-29 RX ADMIN — FOLIC ACID 1 MG: 1 TABLET ORAL at 08:15

## 2023-06-29 RX ADMIN — NICOTINE POLACRILEX 4 MG: 4 GUM, CHEWING BUCCAL at 12:02

## 2023-06-29 RX ADMIN — NICOTINE POLACRILEX 4 MG: 4 GUM, CHEWING BUCCAL at 19:23

## 2023-06-29 RX ADMIN — Medication 1 TABLET: at 08:10

## 2023-06-29 RX ADMIN — LIDOCAINE 1 PATCH: 50 PATCH TOPICAL at 08:16

## 2023-06-29 RX ADMIN — RISPERIDONE 2 MG: 2 TABLET ORAL at 21:17

## 2023-06-29 RX ADMIN — GABAPENTIN 100 MG: 100 CAPSULE ORAL at 08:10

## 2023-06-29 RX ADMIN — GABAPENTIN 100 MG: 100 CAPSULE ORAL at 21:17

## 2023-06-29 NOTE — PLAN OF CARE
Problem: PSYCHOSIS  Goal: Will report no hallucinations or delusions  Description: Interventions:  - Administer medication as  ordered  - Every waking shifts and PRN assess for the presence of hallucinations and or delusions  - Assist with reality testing to support increasing orientation  - Assess if patient's hallucinations or delusions are encouraging self-harm or harm to others and intervene as appropriate  Outcome: Progressing     Problem: SUBSTANCE USE/ABUSE  Goal: Will have no detox symptoms and will verbalize plan for changing substance-related behavior  Description: INTERVENTIONS:  - Monitor physical status and assess for symptoms of withdrawal  - Administer medication as ordered  - Provide emotional support with 1 on 1 interaction with staff  - Encourage recovery focused program/ addiction education  - Assess for verbalization of changing behaviors related to substance abuse  - Initiate consults and referrals as appropriate (Case Management, Spiritual Care, etc.)  Outcome: Progressing  Goal: By discharge, will develop insight into their chemical dependency and sustain motivation to continue in recovery  Description: INTERVENTIONS:  - Attends all daily group sessions and scheduled AA groups  - Actively practices coping skills through participation in the therapeutic community and adherence to program rules  - Reviews and completes assignments from individual treatment plan  - Assist patient development of understanding of their personal cycle of addiction and relapse triggers  Outcome: Progressing  Goal: By discharge, patient will have ongoing treatment plan addressing chemical dependency  Description: INTERVENTIONS:  - Assist patient with resources and/or appointments for ongoing recovery based living  Outcome: Progressing     Problem: SLEEP DISTURBANCE  Goal: Will exhibit normal sleeping pattern  Description: Interventions:  -  Assess the patients sleep pattern, noting recent changes  - Administer medication as ordered  - Decrease environmental stimuli, including noise, as appropriate during the night  - Encourage the patient to actively participate in unit groups and or exercise during the day to enhance ability to achieve adequate sleep at night  - Assess the patient, in the morning, encouraging a description of sleep experience  Outcome: Progressing     Problem: Ineffective Coping  Goal: Participates in unit activities  Description: Interventions:  - Provide therapeutic environment   - Provide required programming   - Redirect inappropriate behaviors   Outcome: Progressing     Problem: SAFETY ADULT  Goal: Patient will remain free of falls  Description: INTERVENTIONS:  - Educate patient/family on patient safety including physical limitations  - Instruct patient to call for assistance with activity   - Consult OT/PT to assist with strengthening/mobility   - Keep Call bell within reach  - Keep bed low and locked with side rails adjusted as appropriate  - Keep care items and personal belongings within reach  - Initiate and maintain comfort rounds  - Make Fall Risk Sign visible to staff  - Offer Toileting every Hours, in advance of need  - Initiate/Maintain alarm  - Obtain necessary fall risk management equipment:  - Apply yellow socks and bracelet for high fall risk patients  - Consider moving patient to room near nurses station  Outcome: Progressing

## 2023-06-29 NOTE — NURSING NOTE
Pt pleasant upon approach with bright affect. Denies SI/HI/AVH. Pt has been participating in groups frequently and sleeping less. Pt reports improved mood since arriving to unit and decreased agitation. Pt feels like his medication is "finally good". Pt reports feeling much better discharging from [facility] than previous discharge from [facility]. Pt plans to continue with OP therapy as well as cut back on smoking/drinking in order to remain compliant with his medications. Pt is planning to discharge tomorrow home to his mother who he reports is supportive of his mental health needs. Pt states he is very appreciative of staff here and has had a very positive experience.

## 2023-06-29 NOTE — BH TRANSITION RECORD
Contact Information: If you have any questions, concerns, pended studies, tests and/or procedures, or emergencies regarding your inpatient behavioral health visit. Please contact [facility] behavioral health unit [phone] and ask to speak to a , nurse or physician. A contact is available 24 hours/ 7 days a week at this number. Summary of Procedures Performed During your Stay:  Below is a list of major procedures performed during your hospital stay and a summary of results:  - No major procedures performed. Pending Studies (From admission, onward)    None        Please follow up on the above pending studies with your PCP and/or referring provider.

## 2023-06-29 NOTE — PROGRESS NOTES
Status: Pt is pleasant & cooperative, reporting some anxiety, but denied any SI/HI or hallucinations. Pt attended some groups & appeared to have slept overnight.    Medication: no changes / no PRNs  D/C: Friday /      06/29/23 0750   Team Meeting   Meeting Type Daily Rounds   Team Members Present   Team Members Present Physician;Nurse;   Physician Team Member Dr. Wynema Canavan / Dr. Chris Alicea / Ceci Araujo Team Member Mattie Montalvo / [facility] Management Team Member Moises Cortés / Chanel Flores / Onel Hazard   Patient/Family Present   Patient Present No   Patient's Family Present No

## 2023-06-29 NOTE — TREATMENT TEAM
06/29/23 1230   Activity/Group Checklist   Group Other (Comment)  (art therapy)   Attendance Attended   Attendance Duration (min) 31-45   Interactions Interacted appropriately   Affect/Mood Appropriate   Goals Achieved Able to engage in interactions; Able to listen to others; Other (Comment)  (authentic, spontaneous self expression, connection, and insight)

## 2023-06-29 NOTE — PROGRESS NOTES
Progress Note - Cristobal Jason 26 y.o. male MRN: [id number]   Unit/Bed#: [facility] 215-02 Encounter: [id number]    Behavior over the last 24 hours: improving. Shirley Somers is a 53-year-old male with a history of MDD who presents for psychiatric follow-up. Staff reports no behavioral issues overnight. He has not had any outbursts or conflicts with peers since his lone isolated incident shortly following his admission. He is pleasant, calm and cooperative upon approach. He demonstrates improving insight and is feeling ready for discharge. He states, "my mood is good. I am not getting angry anymore, my thoughts are more clear."  Feels that his medication regimen is quite helpful and he reports tolerating well. He states, "I know I am on a lot of pills, but they are helping me and I want to continue that."  Denies any SI or HI. No AH or VH. Sleep: normal  Appetite: normal  Medication side effects: Yes - some drowsiness  ROS: all other systems are negative    Mental Status Evaluation:    Appearance: casually dressed, appears consistent with stated age, normal grooming  Motor: no psychomotor disturbances, no gait abnormalities  Behavior: calm, cooperative, interacts with this writer appropriately  Speech: normal rate, rhythm, and volume  Mood: "good"  Affect: appropriate, normal range and intensity, mood-congruent  Thought Process: organized, linear, and goal-oriented; intact associations  Thought Content: denies any delusional material, no preoccupation  Perception: denies any auditory or visual hallucinations, denies other perceptual disturbances  Risk Potential: denies suicidal ideation, plan, or intent.  Denies homicidal ideation  Sensorium: Oriented to person, place, time, and situation  Cognition: cognitive ability appears intact but was not quantitatively tested  Consciousness: alert and awake  Attention/Concentration: able to focus without difficulty, attention and concentration are age appropriate  Insight: improved  Judgement: improved    Vital signs in last 24 hours:    Temp:  [97.1 °F (36.2 °C)-98 °F (36.7 °C)] 97.1 °F (36.2 °C)  HR:  [73-94] 73  Resp:  [18] 18  BP: (123-126)/(57-68) 123/68    Laboratory results: I have personally reviewed all pertinent laboratory/tests results    Results from the past 24 hours: No results found for this or any previous visit (from the past 24 hour(s)). Most Recent Labs:   Lab Results   Component Value Date    WBC 9.98 06/22/2023    RBC 4.89 06/22/2023    HGB 14.5 06/22/2023    HCT 44.6 06/22/2023     06/22/2023    RDW 12.9 06/22/2023    NEUTROABS 5.37 06/22/2023    SODIUM 137 06/22/2023    K 4.0 06/22/2023     06/22/2023    CO2 28 06/22/2023    BUN 10 06/22/2023    CREATININE 0.66 06/22/2023    GLUC 83 06/22/2023    CALCIUM 9.3 06/22/2023    AST 22 06/22/2023    ALT 36 06/22/2023    ALKPHOS 65 06/22/2023    TP 7.3 06/22/2023    ALB 4.3 06/22/2023    TBILI 0.31 06/22/2023    CHOLESTEROL 163 06/22/2023    HDL 43 06/22/2023    TRIG 180 (H) 06/22/2023    LDLCALC 84 06/22/2023    NONHDLC 120 06/22/2023    [id number] 5.827 (H) 06/22/2023    FREET4 0.72 06/22/2023    RPR Non-Reactive 01/07/2022    HGBA1C 5.1 04/11/2023     04/11/2023       Progress Toward Goals: progressing    Assessment/Plan   Principal Problem:    Recurrent major depressive disorder ([address])  Active Problems:    Medical clearance for psychiatric admission    Tobacco abuse    Class 1 obesity in adult    Alcohol use      Recommended Treatment:     Planned medication and treatment changes: All current active medications have been reviewed  Encourage group therapy, milieu therapy and occupational therapy  Behavioral Health checks every 7 minutes    Progressing well, continue current medications. Discharge planning for tomorrow.     Current Facility-Administered Medications   Medication Dose Route Frequency Provider Last Rate   • acetaminophen  650 mg Oral Q6H PRN Marciano Hamman, MD     • acetaminophen  650 mg Oral Q4H PRN Marciano Hamman, MD     • acetaminophen  975 mg Oral Q6H PRN Marciano Hamman, MD     • aluminum-magnesium hydroxide-simethicone  30 mL Oral Q4H PRN Marciano Hamman, MD     • haloperidol lactate  2.5 mg Intramuscular Q4H PRN Max 4/day Marciano Hamman, MD      And   • LORazepam  1 mg Intramuscular Q4H PRN Max 4/day Marciano Hamman, MD      And   • benztropine  0.5 mg Intramuscular Q4H PRN Max 4/day Marciano Hamman, MD     • haloperidol lactate  5 mg Intramuscular Q4H PRN Max 4/day Marciano Hamman, MD      And   • LORazepam  2 mg Intramuscular Q4H PRN Max 4/day Marciano Hamman, MD      And   • benztropine  1 mg Intramuscular Q4H PRN Max 4/day Marciano Hamman, MD     • benztropine  0.5 mg Oral HS Celeste Spears MD     • benztropine  0.5 mg Oral Daily Celeste Spears MD     • benztropine  1 mg Oral Q4H PRN Max 6/day Marciano Hamman, MD     • bisacodyl  10 mg Rectal Daily PRN Celeste Spears MD     • hydrOXYzine HCL  50 mg Oral Q6H PRN Max 4/day Marciano Hamman, MD      Or   • diphenhydrAMINE  50 mg Intramuscular Q6H PRN Marciano Hamman, MD     • FLUoxetine  20 mg Oral Daily Celeste Spears MD     • folic acid  1 mg Oral Daily Nixon Garcia PA-C     • gabapentin  100 mg Oral TID Celeste Spears MD     • haloperidol  1 mg Oral Q6H PRN Marciano Hamman, MD     • haloperidol  2.5 mg Oral Q4H PRN Max 4/day Marciano Hamman, MD     • haloperidol  5 mg Oral Q4H PRN Max 4/day Marciano Hamman, MD     • hydrOXYzine HCL  100 mg Oral Q6H PRN Max 4/day Marciano Hamman, MD      Or   • LORazepam  2 mg Intramuscular Q6H PRN Marciano Hamman, MD     • hydrOXYzine HCL  25 mg Oral Q6H PRN Max 4/day Marciano Hamman, MD     • lidocaine  1 patch Topical Daily Jennifer Rich DO     • LORazepam  2 mg Oral Q4H PRN Nixon Garcia PA-C     • multivitamin-minerals  1 tablet Oral Daily Kristina Olivares PA-C     • nicotine polacrilex  4 mg Oral Q2H PRN Irvin Caicedo MD     • polyethylene glycol  17 g Oral Daily PRN Jerrica Link MD     • risperiDONE  1 mg Oral Daily Jerrica Link MD     • risperiDONE  2 mg Oral HS Jerrica Link MD     • senna-docusate sodium  1 tablet Oral Daily PRN Irvin Caicedo MD     • Thiamine Mononitrate  100 mg Oral Daily Kristina Olivares PA-C     • traZODone  50 mg Oral HS PRN Irvin Caicedo MD     • traZODone  50 mg Oral HS Jerrica Link MD       Risks / Benefits of Treatment:    Risks, benefits, and possible side effects of medications explained to patient and patient verbalizes understanding and agreement for treatment. Counseling / Coordination of Care:    Patient's progress discussed with staff in treatment team meeting. Medications, treatment progress and treatment plan reviewed with patient.     Kristina Olivares PA-C 06/29/23

## 2023-06-29 NOTE — PLAN OF CARE
Problem: DISCHARGE PLANNING  Goal: Discharge to home or other facility with appropriate resources  Description: INTERVENTIONS:  - Identify barriers to discharge w/patient and caregiver  - Arrange for needed discharge resources and transportation as appropriate  - Identify discharge learning needs (meds, wound care, etc.)  - Arrange for interpretive services to assist at discharge as needed  - Refer to Case Management Department for coordinating discharge planning if the patient needs post-hospital services based on physician/advanced practitioner order or complex needs related to functional status, cognitive ability, or social support system  Outcome: Adequate for Discharge  Note: D/C is planned for tomorrow. Pt has therapy on 7/6 & medication management on 7/28 at [facility].

## 2023-06-29 NOTE — PROGRESS NOTES
[facility] Group Note     06/29/23 1415   Activity/Group Checklist   Group Personal control  (Music and Lyrics)   Attendance Attended   Attendance Duration (min) Greater than 60   Interactions Interacted appropriately   Affect/Mood Appropriate;Bright  (Focused)   Goals Achieved Identified feelings; Identified triggers; Discussed coping strategies; Able to listen to others; Able to engage in interactions; Able to reflect/comment on own behavior;Able to self-disclose; Able to recieve feedback; Able to give feedback to another

## 2023-06-29 NOTE — CASE MANAGEMENT
CM contacted [facility] at [phone] & Pt was scheduled for therapy with Eduar Kent on 7/6 at 9 AM via phone & medication management on 7/28 at 12 PM with Dr. Юлия Frazier via phone. CM confirmed fax number [phone] to send discharge information. CM electronically sent transportation request for tomorrow: [id number] via [employer].
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
## 2023-06-30 VITALS
OXYGEN SATURATION: 98 % | HEART RATE: 72 BPM | HEIGHT: 71 IN | RESPIRATION RATE: 16 BRPM | TEMPERATURE: 97.8 F | WEIGHT: 234.13 LBS | BODY MASS INDEX: 32.78 KG/M2 | SYSTOLIC BLOOD PRESSURE: 129 MMHG | DIASTOLIC BLOOD PRESSURE: 77 MMHG

## 2023-06-30 PROCEDURE — 99239 HOSP IP/OBS DSCHRG MGMT >30: CPT | Performed by: STUDENT IN AN ORGANIZED HEALTH CARE EDUCATION/TRAINING PROGRAM

## 2023-06-30 RX ORDER — TRAZODONE HYDROCHLORIDE 50 MG/1
50 TABLET ORAL
Qty: 30 TABLET | Refills: 1 | Status: SHIPPED | OUTPATIENT
Start: 2023-06-30 | End: 2023-08-29

## 2023-06-30 RX ORDER — FLUOXETINE HYDROCHLORIDE 20 MG/1
20 CAPSULE ORAL DAILY
Qty: 30 CAPSULE | Refills: 1 | Status: SHIPPED | OUTPATIENT
Start: 2023-06-30 | End: 2023-08-29

## 2023-06-30 RX ORDER — FOLIC ACID 1 MG/1
1 TABLET ORAL DAILY
Qty: 30 TABLET | Refills: 0 | Status: SHIPPED | OUTPATIENT
Start: 2023-06-30 | End: 2023-07-30

## 2023-06-30 RX ORDER — BENZTROPINE MESYLATE 0.5 MG/1
0.5 TABLET ORAL 2 TIMES DAILY
Qty: 60 TABLET | Refills: 1 | Status: SHIPPED | OUTPATIENT
Start: 2023-06-30 | End: 2023-08-29

## 2023-06-30 RX ORDER — THIAMINE MONONITRATE (VIT B1) 100 MG
100 TABLET ORAL DAILY
Qty: 30 TABLET | Refills: 0 | Status: SHIPPED | OUTPATIENT
Start: 2023-06-30 | End: 2023-07-30

## 2023-06-30 RX ORDER — RISPERIDONE 1 MG/1
1 TABLET ORAL DAILY
Qty: 30 TABLET | Refills: 1 | Status: SHIPPED | OUTPATIENT
Start: 2023-06-30 | End: 2023-08-29

## 2023-06-30 RX ORDER — GABAPENTIN 100 MG/1
100 CAPSULE ORAL 3 TIMES DAILY
Qty: 90 CAPSULE | Refills: 1 | Status: SHIPPED | OUTPATIENT
Start: 2023-06-30 | End: 2023-08-29

## 2023-06-30 RX ORDER — RISPERIDONE 2 MG/1
2 TABLET ORAL
Qty: 30 TABLET | Refills: 1 | Status: SHIPPED | OUTPATIENT
Start: 2023-06-30 | End: 2023-08-29

## 2023-06-30 RX ADMIN — FOLIC ACID 1 MG: 1 TABLET ORAL at 09:21

## 2023-06-30 RX ADMIN — BENZTROPINE MESYLATE 0.5 MG: 0.5 TABLET ORAL at 09:21

## 2023-06-30 RX ADMIN — THIAMINE HCL TAB 100 MG 100 MG: 100 TAB at 09:20

## 2023-06-30 RX ADMIN — GABAPENTIN 100 MG: 100 CAPSULE ORAL at 09:20

## 2023-06-30 RX ADMIN — RISPERIDONE 1 MG: 1 TABLET ORAL at 09:20

## 2023-06-30 RX ADMIN — FLUOXETINE 20 MG: 20 CAPSULE ORAL at 09:20

## 2023-06-30 RX ADMIN — Medication 1 TABLET: at 09:20

## 2023-06-30 NOTE — NURSING NOTE
Patient calm, cooperative and pleasant during interview. Denies SI/HI/AVH. Patient smiling and bright when talking about discharging tomorrow. States "I feel ready to go." Visible on milieu throughout evening, walking the halls and watching TV in day room. Med and meal compliant. Denies any questions or concerns at this time.

## 2023-06-30 NOTE — NURSING NOTE
AVS reviewed and prescriptions have been e-prescribed. Pt expresses understanding of all. Pt reports feeling much better about discharge today compared to previous inpatient. Pt feels he is better able to understand discharge instructions and continue with OP tx. Pt discharged from unit via [id number].

## 2023-06-30 NOTE — DISCHARGE INSTR - OTHER ORDERS
Edwigeoma Appl is a confidential 7 days/week telephone support service manned by trained mental health consumers. [facility] operates daily but is not able to accept calls between 2AM-6AM.   [facility] provides support, a listening ear and can provide information about available services. [facility] specializes in the concerns of mental health consumers, their families and friends. However, we are also here for anyone who has a mental health concern, is confused about or just doesn't know anything about mental health or where to get information. To reach Carley Misticom, call [phone] accepts calls between 6:00 AM to 10:00 AM and from 4:00 PM to 12:00 AM. Effective July 1, 2023, please call [phone]. Text CONNECT to [phone] from anywhere in the [facility], anytime, about any type of crisis. A live, trained Crisis Counselor receives the text and lets you know that they are here to listen. The volunteer Crisis Counselor will help you move from a hot moment to a cool moment. [facility] ([facility]) AT [employer] - licensed telephone and mobile crisis services that provide mental health assessments to all age groups regardless of income or insurance. Crisis Intervention operates 24-hour/7 days a week. 611  Julian White assists consumer who are experiencing a mental health emergency and lack the resources to assist themselves. Immediate intervention for suicidal and depressed individuals with home visits/outreach being top priority. Crisis can be contacted at [phone]. The [facility] ([facility]) offers various education & support groups for you & your family. For more information visit their website at http://www.[employer]/.    Dial 2-1-1 to get connected/get help.   Free, confidential information & referral available 24/7: Aging Services, Child & Youth Services, Counseling, Education/Training, Food/Shelter/Clothing, [employer], Parenting, Substance Abuse, Support Groups, Volunteer Opportunities, & much more. Phone: 2-1-1 or [phone], Web: [email or web address], Email: [email or web address]    [facility]  The [facility] ([address] [place] [address]) offers persons with a mental illness a safe, healing environment to explore their personal and vocational potential and receive support in achieving their goals. Instead of traditional therapy, the work of the house is the rehabilitation. As members contribute meaningful work to the house, they build confidence and a sense of purpose. Be a Member - It's Free  Membership in the [facility] is open to any [facility] resident who is 25 or older and has a history of mental illness. Membership is free for life. [address], [address] [place] [address]  Hours: Monday - Friday: 8 a.m. - 4 p.m. With varying hours on holidays   ([address] [place] [address] ([address] [place] [address]) provides a stress-free atmosphere for persons 18 and older who have experienced mental health issues. What The [address] [employer]  Holiday gatherings  Recovery  Employment  Education  Community Resources  Empowerment  Helps participants achieve their goals  Enhances quality of life  Encourages leadership    The [address]  Hours: Monday through Friday: 4 p.m. - 9 p.m. Saturday: 2 p.m. - 8 p.m. Closed Sundays  Varying hours on holidays            Contact [phone]    Support & Referral Helpline  Support and Referral Helpline is a 24-hour, 7 days-a-week, listening and referral service provided to all of [place]. Please call [phone] or tty [phone] to speak with one of our specialists. The helpline is possible through partnerships with the [address] [place] [address] [place].       The [facility] (formerly known as the [facility]) provides free and confidential emotional support to people in suicidal crisis or emotional distress 24 hours a day, 7 days a week, across the [facility]. The Lifeline is comprised of a national network of over 200 local crisis centers, combining custom local care and resources with national standards and best practices.

## 2023-06-30 NOTE — NURSING NOTE
Pt is calm and cooperative, pleasant on approach. Expresses readiness for discharge today and feeling excited. Reports improved mood since admission and meds are helping. Pt is observed to be social with peers this morning and completing ADLs appropriately.

## 2023-06-30 NOTE — DISCHARGE INSTR - APPOINTMENTS
Maine Keane or Safia, our Gregorio and Nitin, will be calling you after your discharge, on the phone number that you provided. They will be available as an additional support, if needed. If you wish to speak with one of them, you may contact Maine Keane at [phone] or Giovana Ivy at [phone]. You are being discharged home to [address] [place] [address]. You provided your brother Jakob's phone number, [phone] as the best way to contact you.

## 2023-06-30 NOTE — NURSING NOTE
Patient appeared to sleep well. Observed resting in bed with eyes closed, respirations regular, even and non-labored, throughout the night. No signs of distress, arouses easily. Q7 minute rounds maintained throughout shift for patient safety.

## 2023-07-03 NOTE — CASE MANAGEMENT
CM met with Pt who verbalized readiness for discharge. Pt read & signed [employer] waiver. CM assisted Pt with calling Janette Starks at [facility] @ [phone] & he was able to verify / give consent for his appointments. Pt had no questions about his d/c plans.

## 2023-07-03 NOTE — PROGRESS NOTES
Status: Pt reports that his mood has improved. He is social & pleasant & denied any SI/HI or hallucinations. Pt appeared to have slept overnight. Medication: no changes / no PRNs  D/C: Today - [employer] at 1015 / Pt has an intake [facility] on 7/6 at 0900 for therapy & 7/28 at 1200 for med management. 06/30/23 0820   Team Meeting   Meeting Type Daily Rounds   Team Members Present   Team Members Present Physician;Nurse;; Other (Discipline and Name)   Physician Team Member Dr. Isha Mckeon / Dr. Paola David / Diane Chun Member Regina Tovar / [facility] Management Team Member Severiano Destine / Rhonda Tabor / Doir Ng   Other (Discipline and Name) Rajan(art therapy)   Patient/Family Present   Patient Present No   Patient's Family Present No

## 2023-09-12 NOTE — PROGRESS NOTES
Progress Note - [address] [place] Avharini E 26 y.o. male MRN: [id number]  Unit/Bed#: [facility] 215-02 Encounter: [id number]    Assessment/Plan   Principal Problem:    Recurrent major depressive disorder ([address])  Active Problems:    Medical clearance for psychiatric admission    Tobacco abuse    Class 1 obesity in adult    Alcohol use      Subjective: Patient was seen, chart was reviewed, and case was discussed with the team. As per report patient had an altercation with another peer on the unit, staff intervened and gave PRN medication. Patient appears withdrawn, unkempt, guarded, paranoid, scant and superficial. Denies all psychiatric symptoms at this time. Sleep and appetite are good. He is compliant with medications. Denies any side effects.    Behavior over the last 24 hours:  unchanged  Sleep: normal  Appetite: normal  Medication side effects: No    Medical ROS: Pertinent items are noted in HPI.all other systems are negative    Current Medications:  Current Facility-Administered Medications   Medication Dose Route Frequency   • acetaminophen (TYLENOL) tablet 650 mg  650 mg Oral Q6H PRN   • acetaminophen (TYLENOL) tablet 650 mg  650 mg Oral Q4H PRN   • acetaminophen (TYLENOL) tablet 975 mg  975 mg Oral Q6H PRN   • aluminum-magnesium hydroxide-simethicone (MYLANTA) oral suspension 30 mL  30 mL Oral Q4H PRN   • haloperidol lactate (HALDOL) injection 2.5 mg  2.5 mg Intramuscular Q4H PRN Max 4/day    And   • LORazepam (ATIVAN) injection 1 mg  1 mg Intramuscular Q4H PRN Max 4/day    And   • benztropine (COGENTIN) injection 0.5 mg  0.5 mg Intramuscular Q4H PRN Max 4/day   • haloperidol lactate (HALDOL) injection 5 mg  5 mg Intramuscular Q4H PRN Max 4/day    And   • LORazepam (ATIVAN) injection 2 mg  2 mg Intramuscular Q4H PRN Max 4/day    And   • benztropine (COGENTIN) injection 1 mg  1 mg Intramuscular Q4H PRN Max 4/day   • benztropine (COGENTIN) tablet 0.5 mg  0.5 mg Oral HS   • benztropine (COGENTIN) tablet 0.5 mg  0.5 mg Oral Daily   • benztropine (COGENTIN) tablet 1 mg  1 mg Oral Q4H PRN Max 6/day   • bisacodyl (DULCOLAX) rectal suppository 10 mg  10 mg Rectal Daily PRN   • hydrOXYzine HCL (ATARAX) tablet 50 mg  50 mg Oral Q6H PRN Max 4/day    Or   • diphenhydrAMINE (BENADRYL) injection 50 mg  50 mg Intramuscular Q6H PRN   • FLUoxetine (PROzac) capsule 20 mg  20 mg Oral Daily   • folic acid (FOLVITE) tablet 1 mg  1 mg Oral Daily   • haloperidol (HALDOL) tablet 1 mg  1 mg Oral Q6H PRN   • haloperidol (HALDOL) tablet 2.5 mg  2.5 mg Oral Q4H PRN Max 4/day   • haloperidol (HALDOL) tablet 5 mg  5 mg Oral Q4H PRN Max 4/day   • hydrOXYzine HCL (ATARAX) tablet 100 mg  100 mg Oral Q6H PRN Max 4/day    Or   • LORazepam (ATIVAN) injection 2 mg  2 mg Intramuscular Q6H PRN   • hydrOXYzine HCL (ATARAX) tablet 25 mg  25 mg Oral Q6H PRN Max 4/day   • LORazepam (ATIVAN) tablet 2 mg  2 mg Oral Q4H PRN   • multivitamin-minerals (CENTRUM) tablet 1 tablet  1 tablet Oral Daily   • nicotine polacrilex (NICORETTE) gum 4 mg  4 mg Oral Q2H PRN   • polyethylene glycol (MIRALAX) packet 17 g  17 g Oral Daily PRN   • risperiDONE (RisperDAL) tablet 1 mg  1 mg Oral Daily   • risperiDONE (RisperDAL) tablet 2 mg  2 mg Oral HS   • senna-docusate sodium (SENOKOT S) 8.6-50 mg per tablet 1 tablet  1 tablet Oral Daily PRN   • thiamine tablet 100 mg  100 mg Oral Daily   • traZODone (DESYREL) tablet 50 mg  50 mg Oral HS PRN   • traZODone (DESYREL) tablet 50 mg  50 mg Oral HS       Behavioral Health Medications:   all current active meds have been reviewed. Vitals:  Vitals:    06/23/23 1519   BP: 146/72   Pulse: 79   Resp: 17   Temp: 97.7 °F (36.5 °C)   SpO2: 100%       Laboratory results:    I have personally reviewed all pertinent laboratory/tests results.     Mental Status Evaluation:    Appearance:  disheveled   Behavior:  guarded   Speech:  soft and scant   Mood:  anxious   Affect:  constricted and flat   Thought Process:  slow delayed   Thought Content:  paranoia   Perceptual Disturbances: None   Risk Potential: Suicidal Ideations none  Homicidal Ideations none  Potential for Aggression No   Sensorium:  person, place and time/date   Memory:  recent and remote memory grossly intact   Consciousness:  alert and awake    Attention: attention span appeared shorter than expected for age   Insight:  limited   Judgment: limited   Gait/Station: normal gait/station   Motor Activity: no abnormal movements       Progress Toward Goals: Progressing    Recommended Treatment: Continue with pharmacotherapy, group therapy, milieu therapy and occupational therapy. 1.Increase Risperdal to 3 mg/day  2. Cogentin 0.5 mg BID  Risks, benefits and possible side effects of Medications:   Risks, benefits, alternatives, and possible side effects of patient's psychiatric medications were discussed with patient. Cibinqo Counseling: I discussed with the patient the risks of Cibinqo therapy including but not limited to common cold, nausea, headache, cold sores, increased blood CPK levels, dizziness, UTIs, fatigue, acne, and vomitting. Live vaccines should be avoided.  This medication has been linked to serious infections; higher rate of mortality; malignancy and lymphoproliferative disorders; major adverse cardiovascular events; thrombosis; thrombocytopenia and lymphopenia; lipid elevations; and retinal detachment.

## 2024-04-17 ENCOUNTER — APPOINTMENT (OUTPATIENT)
Dept: URGENT CARE | Age: 24
End: 2024-04-17

## 2024-06-24 ENCOUNTER — HOSPITAL ENCOUNTER (EMERGENCY)
Facility: HOSPITAL | Age: 24
Discharge: HOME | End: 2024-06-24
Attending: EMERGENCY MEDICINE
Payer: COMMERCIAL

## 2024-06-24 VITALS
TEMPERATURE: 98.5 F | SYSTOLIC BLOOD PRESSURE: 134 MMHG | DIASTOLIC BLOOD PRESSURE: 80 MMHG | OXYGEN SATURATION: 100 % | HEART RATE: 75 BPM | RESPIRATION RATE: 18 BRPM

## 2024-06-24 DIAGNOSIS — L25.9 CONTACT DERMATITIS: Primary | ICD-10-CM

## 2024-06-24 PROCEDURE — 99282 EMERGENCY DEPT VISIT SF MDM: CPT

## 2024-06-24 PROCEDURE — 99284 EMERGENCY DEPT VISIT MOD MDM: CPT | Performed by: EMERGENCY MEDICINE

## 2024-06-24 RX ORDER — TRIAMCINOLONE ACETONIDE 1 MG/G
CREAM TOPICAL 2 TIMES DAILY
Qty: 30 G | Refills: 0 | Status: SHIPPED | OUTPATIENT
Start: 2024-06-24

## 2024-06-24 NOTE — DISCHARGE INSTRUCTIONS
Please use the topical ointments to help with the itching and pain your experiencing. Please follow up with your pcp.

## 2024-06-24 NOTE — ED PROVIDER NOTES
Chief Complaint   Patient presents with    Rash     Pt has a blister like rash that appeared on L arm 2 days ago, started as a red bump and now they're fluid filled and oozing. Pt endorses itchiness & burning.     History of Present Illness and Review of Systems   This is a 24 y.o. male with no significant past medical history states that he was out on his deck yesterday and started to get a fluid-filled vesicular rash over his left arm.  Patient states that this is itchy and painful.  The patient has not had any fever chills nausea vomiting diarrhea.  The patient does not have any tick exposure.    No other complaints for this encounter.    Remainder of ROS Reviewed and Non-Pertinent    Past Medical, Past Surgical History:    has no past medical history on file.   has no past surgical history on file.     Allergies:   No Known Allergies    Social and Family History:     Social History     Substance and Sexual Activity   Alcohol Use Never     Social History     Tobacco Use   Smoking Status Never   Smokeless Tobacco Never     Social History     Substance and Sexual Activity   Drug Use Never       Physical Examination     Vitals:    06/24/24 1855   BP: 134/80   BP Location: Right arm   Pulse: 75   Resp: 18   Temp: 98.5 °F (36.9 °C)   TempSrc: Temporal   SpO2: 100%       Physical Exam  Vitals and nursing note reviewed.   Constitutional:       General: He is not in acute distress.     Appearance: He is well-developed.   HENT:      Head: Normocephalic and atraumatic.   Eyes:      Conjunctiva/sclera: Conjunctivae normal.   Cardiovascular:      Rate and Rhythm: Normal rate and regular rhythm.      Heart sounds: No murmur heard.  Pulmonary:      Effort: Pulmonary effort is normal. No respiratory distress.      Breath sounds: Normal breath sounds.   Abdominal:      Palpations: Abdomen is soft.      Tenderness: There is no abdominal tenderness.   Musculoskeletal:         General: No swelling.        Arms:       Cervical back:  "Neck supple.   Skin:     General: Skin is warm and dry.      Capillary Refill: Capillary refill takes less than 2 seconds.      Findings: Rash present.   Neurological:      Mental Status: He is alert.   Psychiatric:         Mood and Affect: Mood normal.           Procedures   Procedures      MDM:   Medical Decision Making  Risk  OTC drugs.  Prescription drug management.    24-year-old male with a rash.  Physical exam above  Differentials include contact dermatitis versus poison ivy versus varicella.    Patient will be prescribed topical Benadryl and triamcinolone cream with recommendation to follow-up with his PCP.    - Reviewed relevant past office visits/hospitalizations/procedures  -Obtained pertinent history that influenced decision making from the         Final Dispo   Final Diagnosis:  1. Contact dermatitis      Time reflects when diagnosis was documented in both MDM as applicable and the Disposition within this note       Time User Action Codes Description Comment    6/24/2024  7:52 PM Bao Grady Add [L25.9] Contact dermatitis           ED Disposition       ED Disposition   Discharge    Condition   Stable    Date/Time   Mon Jun 24, 2024  7:52 PM    Comment   Jason Bradshaw discharge to home/self care.                   Follow-up Information       Follow up With Specialties Details Why Contact Info    [employer]    [phone]            Medications - No data to display    Risk Stratification Tools                No orders of the defined types were placed in this encounter.      Labs:   Labs Reviewed - No data to display    Imaging:     No orders to display      All details of the evaluation and treatment plan were made clear and additionally all questions and concerns were addressed while under my care.    Portions of the record may have been created with voice recognition software. Occasional wrong word or \"sound a like\" substitutions may have occurred due to the inherent limitations of voice " recognition software. Read the chart carefully and recognize, using context, where substitutions have occurred.    The attending physician physically available and evaluated the above patient alongside myself.      Bao Grady MD  06/27/24 4280

## 2024-06-27 NOTE — ED ATTENDING ATTESTATION
6/24/2024   ILouann MD, saw and evaluated the patient. I have discussed the patient with the resident/non-physician practitioner and agree with the resident's/non-physician practitioner's findings, Plan of Care, and MDM as documented in the resident's/non-physician practitioner's note, except where noted. All available labs and Radiology studies were reviewed.  I was present for key portions of any procedure(s) performed by the resident/non-physician practitioner and I was immediately available to provide assistance.       At this point I agree with the current assessment done in the Emergency Department.  I have conducted an independent evaluation of this patient a history and physical is as follows:    Unit/Bed#: [facility] Encounter: [id number]    Chief Complaint   Patient presents with    Rash     Pt has a blister like rash that appeared on L arm 2 days ago, started as a red bump and now they're fluid filled and oozing. Pt endorses itchiness & burning.     24-year-old male presenting with fluid-filled blisterlike rash to his left arm.  It is itchy and painful.  No systemic symptoms, no fevers, no generalized bodyaches.  No known sick contacts.  Does not believe he came across any new substances/detergents/lotions, or any plants/poison ivy.  Patient has been using an ointment to help with the rash however, the rash continues to be painful and itchy.    Physical Exam  ED Triage Vitals [06/24/24 1855]   Temperature Pulse Respirations Blood Pressure SpO2   98.5 °F (36.9 °C) 75 18 134/80 100 %      Temp Source Heart Rate Source Patient Position - Orthostatic VS BP Location FiO2 (%)   Temporal Monitor Lying Right arm --      Pain Score       7           Constitutional:  Awake, alert, oriented.  No acute distress.  HEENT:  Normocephalic, atraumatic.  Sclera anicteric, conjunctiva not injected.  Moist oral mucosa.  Cardiac:  Appears well-perfused  Respiratory:  Breathing comfortably on room air  Abdomen:   Nondistended  Extremities:  No deformities, no edema  Integument: There are several sparse erythematous fluid-filled vesicles and nodules to patient's left elbow.  Neurologic:  Awake, alert, and oriented x3.  Nonfocal exam.  Psychiatric:  Normal affect      ED Course  Medications - No data to display     24-year-old male presenting with erythematous pruritic vesicles/nodules to left elbow.  Vital signs reviewed, within normal limits.  Differential diagnosis includes contact dermatitis, rhus dermatitis, with other etiologies such as zoster considered.  Will start patient on topical Kenalog cream as well as topical Benadryl cream and recommend follow-up with PCP.    Patient discharged to home with recommendations for symptom control, return precautions, and plan for follow up.

## 2024-07-08 ENCOUNTER — OFFICE VISIT (OUTPATIENT)
Dept: INTERNAL MEDICINE CLINIC | Facility: CLINIC | Age: 24
End: 2024-07-08

## 2024-07-08 VITALS
WEIGHT: 209 LBS | HEART RATE: 67 BPM | HEIGHT: 68 IN | DIASTOLIC BLOOD PRESSURE: 78 MMHG | BODY MASS INDEX: 31.67 KG/M2 | SYSTOLIC BLOOD PRESSURE: 121 MMHG | TEMPERATURE: 97.7 F

## 2024-07-08 DIAGNOSIS — Z13.9 SCREENING DUE: ICD-10-CM

## 2024-07-08 DIAGNOSIS — Z00.00 ENCOUNTER FOR ANNUAL PHYSICAL EXAM: Primary | ICD-10-CM

## 2024-07-08 DIAGNOSIS — Z23 ENCOUNTER FOR IMMUNIZATION: ICD-10-CM

## 2024-07-08 DIAGNOSIS — E66.09 CLASS 1 OBESITY DUE TO EXCESS CALORIES WITH BODY MASS INDEX (BMI) OF 31.0 TO 31.9 IN ADULT, UNSPECIFIED WHETHER SERIOUS COMORBIDITY PRESENT: ICD-10-CM

## 2024-07-08 DIAGNOSIS — R21 RASH: ICD-10-CM

## 2024-07-08 DIAGNOSIS — Z87.09 HISTORY OF ASTHMA: ICD-10-CM

## 2024-07-08 NOTE — PROGRESS NOTES
Adult Annual Physical  Name: Jason Bradshaw      : 2000      MRN: [id number]  Encounter Provider: Bandar Oden MD  Encounter Date: 2024   Encounter department: [facility]    Assessment & Plan   1. Encounter for annual physical exam      Screening due  -     Hemoglobin A1C; Future  -     Lipid Panel with Direct LDL reflex; Future  -     HIV 1/2 AG/AB w Reflex [facility] for 2 yr old and above; Future  -     Hepatitis C Ab W/Refl To HCV RNA, Qn, PCR; Future   -     TdaP; last TdaP in    2. Class 1 obesity due to excess calories with body mass index (BMI) of 31.0 to 31.9 in adult, unspecified whether serious comorbidity present  -     Screening for diabetes given BMI > 25 and ; HbA1C ordered  -     Screening for familial hyperlipidemia and CVD risk given obesity and initiation of care as PCP: Lipid Panel with Direct LDL reflex ordered   -     BMI follow-up plan initiated with focus on portion size during meals   3. Rash   - Resolved; Vesicular pruritic rash improved with triamcinolone and diphenhydramine   - Return to clinic if symptoms recur  4. History of asthma   - Resolved; Documented history of asthma as a child but no history of symptoms, exacerbations, or medication needed   - Counseled on childhood asthma disappearing with age; advised to return to clinic if symptoms recur     Immunizations and preventive care screenings were discussed with patient today. Appropriate education was printed on patient's after visit summary.    Counseling:  Alcohol/drug use: discussed moderation in alcohol intake, the recommendations for healthy alcohol use, and avoidance of illicit drug use.    BMI Counseling: Body mass index is 31.78 kg/m². The BMI is above normal. Nutrition recommendations include decreasing portion sizes and moderation in carbohydrate intake. Exercise recommendations include exercising 3-5 times per week. No pharmacotherapy was ordered. Rationale for  BMI follow-up plan is due to patient being overweight or obese.     Depression Screening and Follow-up Plan: Patient was screened for depression during today's encounter. They screened negative with a PHQ-2 score of 0.        History of Present Illness   Jason Bradshaw is a 24 year old male with PMH of asthma who presents for ED follow-up for rash and to establish care.    He developed a rash on  and then went to the ED two days later. He describes an itchy and painful rash and describes no causative event. He used the diphenhydramine and triamcinolone he was prescribed for a period of seven days. Symptoms resolved and he describes no rash, pain, or itching since.    He describes no other past medical history, but does remember being diagnosed with asthma as a child of [age] years old. He describes occasional symptoms of wheezing during physical exertion, but no chest tightness, cough, shortness of breath, or symptoms at night. He has had no asthma exacerbations and has never used an inhaler.       Adult Annual Physical:  Patient presents for annual physical.     Diet and Physical Activity:  - Diet/Nutrition: well balanced diet.  - Exercise: 5-7 times a week on average.    Depression Screening:  - PHQ-2 Score: 0    General Health:  - Sleep: sleeps well.  - Hearing: normal hearing bilateral ears.  - Vision: no vision problems.  - Dental: regular dental visits.     Health:  - History of STDs: no.   - Urinary symptoms: none.     Advanced Care Planning:  - Has an advanced directive?: no    - Has a durable medical POA?: no    - ACP document given to patient?: no      Review of Systems      PMH/Surgical History  None    Allergies  None    Family History  T2DM in father and uncles on dad's side     Social History  Smokin year history of vaping ([age]) but has stopped for the past 3 months  Alcohol: 4-5 drinks/week, drinks on weekends  Drugs: None   Diet: does majority of his own cooking - chicken, talapia,  "rice and beans   Exercise: weightlifting 3 days/week, running for 60 min 5-7 days/week  Occupation:  Living Situation: lives in apartment with girlfriend     Objective     /78 (BP Location: Left arm, Patient Position: Sitting, Cuff Size: Large)   Pulse 67   Temp 97.7 °F (36.5 °C) (Temporal)   Ht 5' 8\" (1.727 m)   Wt 94.8 kg (209 lb)   BMI 31.78 kg/m²     Physical Exam  Constitutional:       Appearance: Normal appearance.   HENT:      Head: Normocephalic and atraumatic.      Mouth/Throat:      Mouth: Mucous membranes are dry.      Pharynx: Oropharynx is clear.   Cardiovascular:      Pulses: Normal pulses.      Heart sounds: Normal heart sounds.   Pulmonary:      Effort: Pulmonary effort is normal.      Breath sounds: Normal breath sounds.   Abdominal:      General: Abdomen is flat. Bowel sounds are normal.      Palpations: Abdomen is soft.   Musculoskeletal:      Cervical back: Neck supple.   Skin:     General: Skin is warm and dry.   Neurological:      Mental Status: He is alert.       Administrative Statements       Patient seen by Bandar Oden (PGY-1)    "

## 2024-07-12 ENCOUNTER — OFFICE VISIT (OUTPATIENT)
Dept: INTERNAL MEDICINE CLINIC | Facility: CLINIC | Age: 24
End: 2024-07-12

## 2024-07-12 VITALS
DIASTOLIC BLOOD PRESSURE: 75 MMHG | WEIGHT: 208 LBS | SYSTOLIC BLOOD PRESSURE: 115 MMHG | TEMPERATURE: 98.1 F | BODY MASS INDEX: 31.52 KG/M2 | HEIGHT: 68 IN | HEART RATE: 76 BPM

## 2024-07-12 DIAGNOSIS — M26.31 TOOTH CROWDING: ICD-10-CM

## 2024-07-12 DIAGNOSIS — L21.9 SEBORRHEA: Primary | ICD-10-CM

## 2024-07-12 RX ORDER — FLUCONAZOLE 150 MG/1
150 TABLET ORAL DAILY
Qty: 7 TABLET | Refills: 0 | Status: SHIPPED | OUTPATIENT
Start: 2024-07-12 | End: 2024-07-19

## 2024-07-12 RX ORDER — KETOCONAZOLE 20 MG/ML
SHAMPOO TOPICAL
Qty: 120 ML | Refills: 3 | Status: SHIPPED | OUTPATIENT
Start: 2024-07-12

## 2024-07-12 NOTE — PROGRESS NOTES
"Ambulatory Visit  Name: Jason Bradshaw      : 2000      MRN: [id number]  Encounter Provider: Bandar Oden MD  Encounter Date: 2024   Encounter department: [facility]    Assessment & Plan   1. Seborrhea  - Chronic scalp and hairline rash; differential includes seborrhea vs mild dermatitis (dandruff) vs tinea capitis/tinea barbae vs atopic dermatitis   - Likeliest seborrhea given location of scalp and face along with appearance   - Ordered ketoconazole (NIZORAL) 2 % shampoo; Instructed to apply at least 30 mins before showering. Apply before wetting scalp and lather well. Apply twice a week for the first three months, then once a week after.  - Ordered fluconazole (DIFLUCAN) 150 mg tablet; Take 1 tablet (150 mg total) by mouth in the morning for 7 days    2. Tooth crowding  - History of molar crowding on the left side; patient was instructed to see OMFS by dentist  - Ordered Ambulatory Referral to Oral Maxillofacial Surgery; Future       History of Present Illness     HPI  Jason Bradshaw is a 24 year old male with no significant PMH who presents for rash.    He reports a two year history of rash found on the scalp, hairline, eyebrows, and beard. He reports findings of itching and flaking that's worse in the winter. He has tried various conditioners including over the counter ketoconazole shampoo but these have provided no relief. He reports no bleeding and no pain.     Review of Systems    Objective     /75 (BP Location: Left arm, Patient Position: Sitting, Cuff Size: Large)   Pulse 76   Temp 98.1 °F (36.7 °C) (Temporal)   Ht 5' 8\" (1.727 m)   Wt 94.3 kg (208 lb)   BMI 31.63 kg/m²     Physical Exam  Skin:     Comments: Dry, rough, flat skin with no erythema, edema, or tenderness at the site. Skin flakes with pressure.        Administrative Statements         "

## 2024-08-09 ENCOUNTER — APPOINTMENT (OUTPATIENT)
Dept: LAB | Facility: HOSPITAL | Age: 24
End: 2024-08-09
Payer: COMMERCIAL

## 2024-08-09 DIAGNOSIS — E66.09 CLASS 1 OBESITY DUE TO EXCESS CALORIES WITH BODY MASS INDEX (BMI) OF 31.0 TO 31.9 IN ADULT, UNSPECIFIED WHETHER SERIOUS COMORBIDITY PRESENT: ICD-10-CM

## 2024-08-09 DIAGNOSIS — Z13.9 SCREENING DUE: ICD-10-CM

## 2024-08-09 LAB
CHOLEST SERPL-MCNC: 139 MG/DL
EST. AVERAGE GLUCOSE BLD GHB EST-MCNC: 103 MG/DL
HBA1C MFR BLD: 5.2 %
HCV AB SER QL: NORMAL
HDLC SERPL-MCNC: 27 MG/DL
HIV 1+2 AB+HIV1 P24 AG SERPL QL IA: NORMAL
HIV 2 AB SERPL QL IA: NORMAL
HIV1 AB SERPL QL IA: NORMAL
HIV1 P24 AG SERPL QL IA: NORMAL
LDLC SERPL CALC-MCNC: 68 MG/DL (ref 0–100)
TRIGL SERPL-MCNC: 218 MG/DL

## 2024-08-09 PROCEDURE — 86803 HEPATITIS C AB TEST: CPT

## 2024-08-09 PROCEDURE — 83036 HEMOGLOBIN GLYCOSYLATED A1C: CPT

## 2024-08-09 PROCEDURE — 80061 LIPID PANEL: CPT

## 2024-08-09 PROCEDURE — 87389 HIV-1 AG W/HIV-1&-2 AB AG IA: CPT

## 2024-08-09 PROCEDURE — 36415 COLL VENOUS BLD VENIPUNCTURE: CPT

## 2024-10-21 ENCOUNTER — TELEPHONE (OUTPATIENT)
Dept: INTERNAL MEDICINE CLINIC | Facility: CLINIC | Age: 24
End: 2024-10-21

## 2025-01-30 ENCOUNTER — HOSPITAL ENCOUNTER (EMERGENCY)
Facility: HOSPITAL | Age: 25
Discharge: HOME | End: 2025-01-31
Attending: EMERGENCY MEDICINE

## 2025-01-30 VITALS
RESPIRATION RATE: 18 BRPM | TEMPERATURE: 97.9 F | DIASTOLIC BLOOD PRESSURE: 83 MMHG | OXYGEN SATURATION: 98 % | HEART RATE: 82 BPM | SYSTOLIC BLOOD PRESSURE: 145 MMHG

## 2025-01-30 DIAGNOSIS — L03.211 FACIAL CELLULITIS: Primary | ICD-10-CM

## 2025-01-30 DIAGNOSIS — R22.0 RIGHT FACIAL SWELLING: ICD-10-CM

## 2025-01-30 PROCEDURE — 93005 ELECTROCARDIOGRAM TRACING: CPT

## 2025-01-30 PROCEDURE — 99285 EMERGENCY DEPT VISIT HI MDM: CPT | Performed by: EMERGENCY MEDICINE

## 2025-01-30 PROCEDURE — 99284 EMERGENCY DEPT VISIT MOD MDM: CPT

## 2025-01-30 RX ORDER — KETOROLAC TROMETHAMINE 30 MG/ML
15 INJECTION, SOLUTION INTRAMUSCULAR; INTRAVENOUS ONCE
Status: COMPLETED | OUTPATIENT
Start: 2025-01-31 | End: 2025-01-31

## 2025-01-30 NOTE — Clinical Note
Jason Goodson Augustine was seen and treated in our emergency department on 1/30/2025.    No restrictions        as tolerated    Diagnosis: ER visit    Jason  .    He may return on this date: 02/01/2025         If you have any questions or concerns, please don't hesitate to call.      Chayo Naylor RN    ______________________________           _______________          _______________  Hospital Representative                              Date                                Time

## 2025-01-30 NOTE — Clinical Note
Jason Goodson Augustine was seen and treated in our emergency department on 1/30/2025.            as tolerated    Diagnosis: ER visit    Jason  .    He may return on this date: 02/05/2025         If you have any questions or concerns, please don't hesitate to call.      Han Geller, DO    ______________________________           _______________          _______________  Hospital Representative                              Date                                Time

## 2025-01-30 NOTE — Clinical Note
accompanied Jason Bradshaw to the emergency department on 1/30/2025.    Return date if applicable: 02/01/2025        If you have any questions or concerns, please don't hesitate to call.      Chayo Naylor RN

## 2025-01-31 ENCOUNTER — APPOINTMENT (EMERGENCY)
Dept: RADIOLOGY | Facility: HOSPITAL | Age: 25
End: 2025-01-31

## 2025-01-31 LAB
ANION GAP SERPL CALCULATED.3IONS-SCNC: 7 MMOL/L (ref 4–13)
ATRIAL RATE: 81 BPM
BASOPHILS # BLD AUTO: 0.04 THOUSANDS/ΜL (ref 0–0.1)
BASOPHILS NFR BLD AUTO: 0 % (ref 0–1)
BUN SERPL-MCNC: 8 MG/DL (ref 5–25)
CALCIUM SERPL-MCNC: 9.2 MG/DL (ref 8.4–10.2)
CHLORIDE SERPL-SCNC: 105 MMOL/L (ref 96–108)
CO2 SERPL-SCNC: 27 MMOL/L (ref 21–32)
CREAT SERPL-MCNC: 0.88 MG/DL (ref 0.6–1.3)
EOSINOPHIL # BLD AUTO: 0.14 THOUSAND/ΜL (ref 0–0.61)
EOSINOPHIL NFR BLD AUTO: 1 % (ref 0–6)
ERYTHROCYTE [DISTWIDTH] IN BLOOD BY AUTOMATED COUNT: 12 % (ref 11.6–15.1)
GFR SERPL CREATININE-BSD FRML MDRD: 121 ML/MIN/1.73SQ M
GLUCOSE SERPL-MCNC: 91 MG/DL (ref 65–140)
HCT VFR BLD AUTO: 45.5 % (ref 36.5–49.3)
HGB BLD-MCNC: 15 G/DL (ref 12–17)
IMM GRANULOCYTES # BLD AUTO: 0.02 THOUSAND/UL (ref 0–0.2)
IMM GRANULOCYTES NFR BLD AUTO: 0 % (ref 0–2)
LYMPHOCYTES # BLD AUTO: 2.87 THOUSANDS/ΜL (ref 0.6–4.47)
LYMPHOCYTES NFR BLD AUTO: 28 % (ref 14–44)
MCH RBC QN AUTO: 29.2 PG (ref 26.8–34.3)
MCHC RBC AUTO-ENTMCNC: 33 G/DL (ref 31.4–37.4)
MCV RBC AUTO: 89 FL (ref 82–98)
MONOCYTES # BLD AUTO: 1.17 THOUSAND/ΜL (ref 0.17–1.22)
MONOCYTES NFR BLD AUTO: 11 % (ref 4–12)
NEUTROPHILS # BLD AUTO: 6.02 THOUSANDS/ΜL (ref 1.85–7.62)
NEUTS SEG NFR BLD AUTO: 60 % (ref 43–75)
NRBC BLD AUTO-RTO: 0 /100 WBCS
P AXIS: 49 DEGREES
PLATELET # BLD AUTO: 291 THOUSANDS/UL (ref 149–390)
PMV BLD AUTO: 8 FL (ref 8.9–12.7)
POTASSIUM SERPL-SCNC: 3.8 MMOL/L (ref 3.5–5.3)
PR INTERVAL: 128 MS
QRS AXIS: 80 DEGREES
QRSD INTERVAL: 96 MS
QT INTERVAL: 364 MS
QTC INTERVAL: 422 MS
RBC # BLD AUTO: 5.14 MILLION/UL (ref 3.88–5.62)
SODIUM SERPL-SCNC: 139 MMOL/L (ref 135–147)
T WAVE AXIS: 18 DEGREES
VENTRICULAR RATE: 81 BPM
WBC # BLD AUTO: 10.26 THOUSAND/UL (ref 4.31–10.16)

## 2025-01-31 PROCEDURE — 36415 COLL VENOUS BLD VENIPUNCTURE: CPT

## 2025-01-31 PROCEDURE — 70487 CT MAXILLOFACIAL W/DYE: CPT

## 2025-01-31 PROCEDURE — 85025 COMPLETE CBC W/AUTO DIFF WBC: CPT

## 2025-01-31 PROCEDURE — 96365 THER/PROPH/DIAG IV INF INIT: CPT

## 2025-01-31 PROCEDURE — 96375 TX/PRO/DX INJ NEW DRUG ADDON: CPT

## 2025-01-31 PROCEDURE — 96376 TX/PRO/DX INJ SAME DRUG ADON: CPT

## 2025-01-31 PROCEDURE — 93010 ELECTROCARDIOGRAM REPORT: CPT | Performed by: INTERNAL MEDICINE

## 2025-01-31 PROCEDURE — 80048 BASIC METABOLIC PNL TOTAL CA: CPT

## 2025-01-31 PROCEDURE — 96361 HYDRATE IV INFUSION ADD-ON: CPT

## 2025-01-31 RX ORDER — OXYCODONE HYDROCHLORIDE 5 MG/1
5 TABLET ORAL EVERY 4 HOURS PRN
Qty: 6 TABLET | Refills: 0 | Status: SHIPPED | OUTPATIENT
Start: 2025-01-31 | End: 2025-02-06

## 2025-01-31 RX ORDER — ONDANSETRON 2 MG/ML
4 INJECTION INTRAMUSCULAR; INTRAVENOUS ONCE
Status: COMPLETED | OUTPATIENT
Start: 2025-01-31 | End: 2025-01-31

## 2025-01-31 RX ORDER — HYDROMORPHONE HCL/PF 1 MG/ML
0.5 SYRINGE (ML) INJECTION ONCE
Refills: 0 | Status: COMPLETED | OUTPATIENT
Start: 2025-01-31 | End: 2025-01-31

## 2025-01-31 RX ORDER — KETOROLAC TROMETHAMINE 30 MG/ML
15 INJECTION, SOLUTION INTRAMUSCULAR; INTRAVENOUS ONCE
Status: COMPLETED | OUTPATIENT
Start: 2025-01-31 | End: 2025-01-31

## 2025-01-31 RX ORDER — DEXAMETHASONE SODIUM PHOSPHATE 10 MG/ML
10 INJECTION, SOLUTION INTRAMUSCULAR; INTRAVENOUS ONCE
Status: COMPLETED | OUTPATIENT
Start: 2025-01-31 | End: 2025-01-31

## 2025-01-31 RX ADMIN — KETOROLAC TROMETHAMINE 15 MG: 30 INJECTION, SOLUTION INTRAMUSCULAR; INTRAVENOUS at 01:58

## 2025-01-31 RX ADMIN — SODIUM CHLORIDE 3 G: 9 INJECTION, SOLUTION INTRAVENOUS at 00:33

## 2025-01-31 RX ADMIN — SODIUM CHLORIDE 1000 ML: 0.9 INJECTION, SOLUTION INTRAVENOUS at 00:34

## 2025-01-31 RX ADMIN — IOHEXOL 85 ML: 350 INJECTION, SOLUTION INTRAVENOUS at 00:45

## 2025-01-31 RX ADMIN — KETOROLAC TROMETHAMINE 15 MG: 30 INJECTION, SOLUTION INTRAMUSCULAR; INTRAVENOUS at 00:01

## 2025-01-31 RX ADMIN — DEXAMETHASONE SODIUM PHOSPHATE 10 MG: 10 INJECTION, SOLUTION INTRAMUSCULAR; INTRAVENOUS at 03:23

## 2025-01-31 RX ADMIN — ONDANSETRON 4 MG: 2 INJECTION INTRAMUSCULAR; INTRAVENOUS at 00:39

## 2025-01-31 RX ADMIN — HYDROMORPHONE HYDROCHLORIDE 0.5 MG: 1 INJECTION, SOLUTION INTRAMUSCULAR; INTRAVENOUS; SUBCUTANEOUS at 00:39

## 2025-01-31 NOTE — ED ATTENDING ATTESTATION
1/30/2025  IMichael MD, saw and evaluated the patient. I have discussed the patient with the resident/non-physician practitioner and agree with the resident's/non-physician practitioner's findings, Plan of Care, and MDM as documented in the resident's/non-physician practitioner's note, except where noted. All available labs and Radiology studies were reviewed.  I was present for key portions of any procedure(s) performed by the resident/non-physician practitioner and I was immediately available to provide assistance.       At this point I agree with the current assessment done in the Emergency Department.  I have conducted an independent evaluation of this patient a history and physical is as follows:      Final Diagnosis:  1. Facial cellulitis    2. Right facial swelling      Chief Complaint   Patient presents with    Facial Swelling     Pt started with R sided dental pain for last few days that turned into R sided facial swelling and pain. Pt also reports dizziness. Seen by emergency dentist earlier            A:  -24-year-old male who presents with right mandibular dental pain and facial swelling.      P:  -CBC to evaluate for evidence of marked leukocytosis or anemia.  -BMP to evaluate electrolytes and renal function.  -CT facial bones with contrast to evaluate for abscess.  -IV Unasyn.      H:    24-year-old male presents with right-sided dental pain and facial swelling.  Has been experiencing right mandibular dental pain for the past week.  Noticed facial swelling starting yesterday.      PMH:  History reviewed. No pertinent past medical history.    PSH:  History reviewed. No pertinent surgical history.      PE:   Vitals:    01/30/25 2325   BP: 145/83   BP Location: Right arm   Pulse: 82   Resp: 18   Temp: 97.9 °F (36.6 °C)   TempSrc: Tympanic   SpO2: 98%         Constitutional: Vital signs are normal. He appears well-developed. He is cooperative. No distress.   Head: Mild right mandibular swelling.   Exam difficult due to extensive facial hair.  Mouth: No trismus.  Tenderness along right mandibular teeth.  Pulmonary/Chest: Effort normal.   Abdominal: Normal appearance.   Neurological: He is alert.  Skin: Skin is warm, dry and intact.   Psychiatric: He has a normal mood and affect. His speech is normal and behavior is normal. Thought content normal.          - 13 point ROS was performed and all are normal unless stated in the history above.   - Nursing note reviewed. Vitals reviewed.   - Orders placed by myself and/or advanced practitioner / resident.    - Previous chart was reviewed  - No language barrier.   - History obtained from patient.   - There are no limitations to the history obtained. Reasons ROS could not be obtained:  N/A         Medications   dexamethasone (PF) (DECADRON) injection 10 mg (has no administration in time range)   ampicillin-sulbactam (UNASYN) 3 g in sodium chloride 0.9 % 100 mL IVPB (0 g Intravenous Stopped 1/31/25 0200)   ketorolac (TORADOL) injection 15 mg (15 mg Intravenous Given 1/31/25 0001)   sodium chloride 0.9 % bolus 1,000 mL (1,000 mL Intravenous New Bag 1/31/25 0034)   HYDROmorphone (DILAUDID) injection 0.5 mg (0.5 mg Intravenous Given 1/31/25 0039)   ondansetron (ZOFRAN) injection 4 mg (4 mg Intravenous Given 1/31/25 0039)   iohexol (OMNIPAQUE) 350 MG/ML injection (SINGLE-DOSE) 85 mL (85 mL Intravenous Given 1/31/25 0045)   ketorolac (TORADOL) injection 15 mg (15 mg Intravenous Given 1/31/25 0158)     CT facial bones with contrast   Final Result      Right facial soft tissue edema without abscess. Correlate for cellulitis. No soft tissue gas.      Scattered dental caries and periodontal disease.      Cystic and calcified region floor of the anterior cranial fossa. Recommend MRI of the brain with and without contrast on an outpatient basis for complete characterization      The study was marked in EPIC for immediate notification.      Workstation performed: [id number]            Orders Placed This Encounter   Procedures    CT facial bones with contrast    Basic metabolic panel    CBC and differential     Labs Reviewed   CBC AND DIFFERENTIAL - Abnormal       Result Value Ref Range Status    WBC 10.26 (*) 4.31 - 10.16 Thousand/uL Final    RBC 5.14  3.88 - 5.62 Million/uL Final    Hemoglobin 15.0  12.0 - 17.0 g/dL Final    Hematocrit 45.5  36.5 - 49.3 % Final    MCV 89  82 - 98 fL Final    MCH 29.2  26.8 - 34.3 pg Final    MCHC 33.0  31.4 - 37.4 g/dL Final    RDW 12.0  11.6 - 15.1 % Final    MPV 8.0 (*) 8.9 - 12.7 fL Final    Platelets 291  149 - 390 Thousands/uL Final    nRBC 0  /100 WBCs Final    Segmented % 60  43 - 75 % Final    Immature Grans % 0  0 - 2 % Final    Lymphocytes % 28  14 - 44 % Final    Monocytes % 11  4 - 12 % Final    Eosinophils Relative 1  0 - 6 % Final    Basophils Relative 0  0 - 1 % Final    Absolute Neutrophils 6.02  1.85 - 7.62 Thousands/µL Final    Absolute Immature Grans 0.02  0.00 - 0.20 Thousand/uL Final    Absolute Lymphocytes 2.87  0.60 - 4.47 Thousands/µL Final    Absolute Monocytes 1.17  0.17 - 1.22 Thousand/µL Final    Eosinophils Absolute 0.14  0.00 - 0.61 Thousand/µL Final    Basophils Absolute 0.04  0.00 - 0.10 Thousands/µL Final   BASIC METABOLIC PANEL    Sodium 139  135 - 147 mmol/L Final    Potassium 3.8  3.5 - 5.3 mmol/L Final    Chloride 105  96 - 108 mmol/L Final    CO2 27  21 - 32 mmol/L Final    ANION GAP 7  4 - 13 mmol/L Final    BUN 8  5 - 25 mg/dL Final    Creatinine 0.88  0.60 - 1.30 mg/dL Final    Comment: Standardized to IDMS reference method    Glucose 91  65 - 140 mg/dL Final    Comment: If the patient is fasting, the ADA then defines impaired fasting glucose as > 100 mg/dL and diabetes as > or equal to 123 mg/dL.    Calcium 9.2  8.4 - 10.2 mg/dL Final    eGFR 121  ml/min/1.73sq m Final    Narrative:     National Kidney Disease Foundation guidelines for Chronic Kidney Disease (CKD):     Stage 1 with normal or high GFR (GFR > 90  mL/min/1.73 square meters)    Stage 2 Mild CKD (GFR = 60-89 mL/min/1.73 square meters)    Stage 3A Moderate CKD (GFR = 45-59 mL/min/1.73 square meters)    Stage 3B Moderate CKD (GFR = 30-44 mL/min/1.73 square meters)    Stage 4 Severe CKD (GFR = 15-29 mL/min/1.73 square meters)    Stage 5 End Stage CKD (GFR <15 mL/min/1.73 square meters)  Note: GFR calculation is accurate only with a steady state creatinine     Time reflects when diagnosis was documented in both MDM as applicable and the Disposition within this note       Time User Action Codes Description Comment    1/31/2025  3:12 AM Han Geller [L03.211] Facial cellulitis     1/31/2025  3:12 AM Han Geller [R22.0] Right facial swelling           ED Disposition       ED Disposition   Discharge    Condition   Stable    Date/Time   Fri Jan 31, 2025  3:12 AM    Comment   Jason Bradshaw discharge to home/self care.                   Follow-up Information    None       Patient's Medications   Discharge Prescriptions    OXYCODONE (ROXICODONE) 5 IMMEDIATE RELEASE TABLET    Take 1 tablet (5 mg total) by mouth every 4 (four) hours as needed for moderate pain for up to 6 days Max Daily Amount: 30 mg       Start Date: 1/31/2025 End Date: 2/6/2025       Order Dose: 5 mg       Quantity: 6 tablet    Refills: 0     No discharge procedures on file.  Prior to Admission Medications   Prescriptions Last Dose Informant Patient Reported? Taking?   diphenhydrAMINE (BENADRYL) 2 % cream   No No   Sig: Apply topically 3 (three) times a day as needed for itching for up to 7 days   Patient not taking: Reported on 7/8/2024   ketoconazole (NIZORAL) 2 % shampoo   No No   Sig: Apply at least 30 mins before showering. Apply before wetting scalp and lather well. Apply twice a week for the first three months, then once a week after.   triamcinolone (KENALOG) 0.1 % cream   No No   Sig: Apply topically 2 (two) times a day   Patient not taking: Reported on 7/8/2024     "  Facility-Administered Medications: None       Portions of the record may have been created with voice recognition software. Occasional wrong word or \"sound a like\" substitutions may have occurred due to the inherent limitations of voice recognition software. Read the chart carefully and recognize, using context, where substitutions have occurred.       ED Course         Critical Care Time  Procedures      "

## 2025-01-31 NOTE — DISCHARGE INSTRUCTIONS
Follow-up with dentist on Tuesday, 2/4.  Use oxycodone for breakthrough pain at night.  Continue to take antibiotics.  Turn to the emergency department if your symptoms worsen or do not improve over the weekend.  Concerning symptoms are unable to swallow, difficulty with speaking, change in voice.

## 2025-01-31 NOTE — ED PROVIDER NOTES
Time reflects when diagnosis was documented in both MDM as applicable and the Disposition within this note       Time User Action Codes Description Comment    1/31/2025  3:12 AM Han Geller [L03.211] Facial cellulitis     1/31/2025  3:12 AM Han Geller [R22.0] Right facial swelling           ED Disposition       ED Disposition   Discharge    Condition   Stable    Date/Time   Fri Jan 31, 2025  3:12 AM    Comment   Jason Bradshaw discharge to home/self care.                   Assessment & Plan       Medical Decision Making  Presentation concerning for odontogenic infection.  Will evaluate with CT scan of face and neck.  Will provide IV antibiotics, basic labs, IV fluids.  CT scan more concerning for cellulitis overlying face.  Antibiotics are appropriate per dentist.  Will discharge home after receiving dose of IV antibiotics and steroids emergency department.  Pain medication sent to pharmacy.  Provided very strict return precautions to which patient verbalized understanding.  Patient able to range jaw more after pain medication provided.    Amount and/or Complexity of Data Reviewed  Labs: ordered.  Radiology: ordered. Decision-making details documented in ED Course.    Risk  Prescription drug management.        ED Course as of 01/31/25 0331   u Jan 30, 2025   2340 Blood Pressure: 145/83   2340 Temperature: 97.9 °F (36.6 °C)   2340 Temp Source: Tympanic   2340 Pulse: 82   2340 Respirations: 18   2340 SpO2: 98 %   Fri Jan 31, 2025   0304 CT facial bones with contrast  IMPRESSION:     Right facial soft tissue edema without abscess. Correlate for cellulitis. No soft tissue gas.     Scattered dental caries and periodontal disease.     Cystic and calcified region floor of the anterior cranial fossa. Recommend MRI of the brain with and without contrast on an outpatient basis for complete characterization     The study was marked in EPIC for immediate notification.     Workstation performed:  [id number]         Medications   ampicillin-sulbactam (UNASYN) 3 g in sodium chloride 0.9 % 100 mL IVPB (0 g Intravenous Stopped 1/31/25 0200)   ketorolac (TORADOL) injection 15 mg (15 mg Intravenous Given 1/31/25 0001)   sodium chloride 0.9 % bolus 1,000 mL (0 mL Intravenous Stopped 1/31/25 0318)   HYDROmorphone (DILAUDID) injection 0.5 mg (0.5 mg Intravenous Given 1/31/25 0039)   ondansetron (ZOFRAN) injection 4 mg (4 mg Intravenous Given 1/31/25 0039)   iohexol (OMNIPAQUE) 350 MG/ML injection (SINGLE-DOSE) 85 mL (85 mL Intravenous Given 1/31/25 0045)   ketorolac (TORADOL) injection 15 mg (15 mg Intravenous Given 1/31/25 0158)   dexamethasone (PF) (DECADRON) injection 10 mg (10 mg Intravenous Given 1/31/25 0323)       ED Risk Strat Scores                                              History of Present Illness       Chief Complaint   Patient presents with    Facial Swelling     Pt started with R sided dental pain for last few days that turned into R sided facial swelling and pain. Pt also reports dizziness. Seen by emergency dentist earlier        History reviewed. No pertinent past medical history.   History reviewed. No pertinent surgical history.   History reviewed. No pertinent family history.   Social History     Tobacco Use    Smoking status: Never    Smokeless tobacco: Never   Vaping Use    Vaping status: Never Used   Substance Use Topics    Alcohol use: Never    Drug use: Never      E-Cigarette/Vaping    E-Cigarette Use Never User       E-Cigarette/Vaping Substances      I have reviewed and agree with the history as documented.     24-year-old male presents emergency department complaining of right-sided facial swelling.  Discomfort and swelling started few days ago.  He mentions he is overall poor dentition.  Evaluated at his dentist office today and they prescribed antibiotics, amoxicillin.  Patient took 1 dose prior to arrival today.  He complains of severe discomfort with opening his jaw.  Only able to  open his jaw 1 finger breath.  Able to manage oral secretions however unable to eat food.  No fevers or chills.  No diabetic history.        Review of Systems   Constitutional:  Negative for chills and fever.   HENT:  Positive for dental problem and facial swelling.    All other systems reviewed and are negative.          Objective       ED Triage Vitals [01/30/25 2325]   Temperature Pulse Blood Pressure Respirations SpO2 Patient Position - Orthostatic VS   97.9 °F (36.6 °C) 82 145/83 18 98 % Sitting      Temp Source Heart Rate Source BP Location FiO2 (%) Pain Score    Tympanic Monitor Right arm -- 10 - Worst Possible Pain      Vitals      Date and Time Temp Pulse SpO2 Resp BP Pain Score FACES Pain Rating User   01/31/25 0158 -- -- -- -- -- 10 - Worst Possible Pain -- MB   01/31/25 0039 -- -- -- -- -- 10 - Worst Possible Pain -- DOUG   01/31/25 0001 -- -- -- -- -- 10 - Worst Possible Pain -- MB   01/30/25 2325 97.9 °F (36.6 °C) 82 98 % 18 145/83 10 - Worst Possible Pain -- OB            Physical Exam  Vitals and nursing note reviewed.   Constitutional:       General: He is not in acute distress.     Appearance: He is well-developed.   HENT:      Head: Normocephalic and atraumatic.      Jaw: Trismus, tenderness and swelling present.     Eyes:      Conjunctiva/sclera: Conjunctivae normal.   Cardiovascular:      Rate and Rhythm: Normal rate and regular rhythm.      Heart sounds: No murmur heard.  Pulmonary:      Effort: Pulmonary effort is normal. No respiratory distress.      Breath sounds: Normal breath sounds.   Abdominal:      Palpations: Abdomen is soft.      Tenderness: There is no abdominal tenderness.   Musculoskeletal:         General: No swelling.      Cervical back: Neck supple.   Skin:     General: Skin is warm and dry.      Capillary Refill: Capillary refill takes less than 2 seconds.   Neurological:      Mental Status: He is alert.   Psychiatric:         Mood and Affect: Mood normal.         Results  Reviewed       Procedure Component Value Units Date/Time    Basic metabolic panel [[id number]] Collected: 01/31/25 0001    Lab Status: Final result Specimen: Blood from Arm, Right Updated: 01/31/25 0030     Sodium 139 mmol/L      Potassium 3.8 mmol/L      Chloride 105 mmol/L      CO2 27 mmol/L      ANION GAP 7 mmol/L      BUN 8 mg/dL      Creatinine 0.88 mg/dL      Glucose 91 mg/dL      Calcium 9.2 mg/dL      eGFR 121 ml/min/1.73sq m     Narrative:      National Kidney Disease Foundation guidelines for Chronic Kidney Disease (CKD):     Stage 1 with normal or high GFR (GFR > 90 mL/min/1.73 square meters)    Stage 2 Mild CKD (GFR = 60-89 mL/min/1.73 square meters)    Stage 3A Moderate CKD (GFR = 45-59 mL/min/1.73 square meters)    Stage 3B Moderate CKD (GFR = 30-44 mL/min/1.73 square meters)    Stage 4 Severe CKD (GFR = 15-29 mL/min/1.73 square meters)    Stage 5 End Stage CKD (GFR <15 mL/min/1.73 square meters)  Note: GFR calculation is accurate only with a steady state creatinine    CBC and differential [[id number]]  (Abnormal) Collected: 01/31/25 0001    Lab Status: Final result Specimen: Blood from Arm, Right Updated: 01/31/25 0027     WBC 10.26 Thousand/uL      RBC 5.14 Million/uL      Hemoglobin 15.0 g/dL      Hematocrit 45.5 %      MCV 89 fL      MCH 29.2 pg      MCHC 33.0 g/dL      RDW 12.0 %      MPV 8.0 fL      Platelets 291 Thousands/uL      nRBC 0 /100 WBCs      Segmented % 60 %      Immature Grans % 0 %      Lymphocytes % 28 %      Monocytes % 11 %      Eosinophils Relative 1 %      Basophils Relative 0 %      Absolute Neutrophils 6.02 Thousands/µL      Absolute Immature Grans 0.02 Thousand/uL      Absolute Lymphocytes 2.87 Thousands/µL      Absolute Monocytes 1.17 Thousand/µL      Eosinophils Absolute 0.14 Thousand/µL      Basophils Absolute 0.04 Thousands/µL             CT facial bones with contrast   Final Interpretation by Vincent Young DO (01/31 0258)      Right facial soft tissue edema without  abscess. Correlate for cellulitis. No soft tissue gas.      Scattered dental caries and periodontal disease.      Cystic and calcified region floor of the anterior cranial fossa. Recommend MRI of the brain with and without contrast on an outpatient basis for complete characterization      The study was marked in EPIC for immediate notification.      Workstation performed: [id number]             Procedures    ED Medication and Procedure Management   Prior to Admission Medications   Prescriptions Last Dose Informant Patient Reported? Taking?   diphenhydrAMINE (BENADRYL) 2 % cream   No No   Sig: Apply topically 3 (three) times a day as needed for itching for up to 7 days   Patient not taking: Reported on 7/8/2024   ketoconazole (NIZORAL) 2 % shampoo   No No   Sig: Apply at least 30 mins before showering. Apply before wetting scalp and lather well. Apply twice a week for the first three months, then once a week after.   triamcinolone (KENALOG) 0.1 % cream   No No   Sig: Apply topically 2 (two) times a day   Patient not taking: Reported on 7/8/2024      Facility-Administered Medications: None     Patient's Medications   Discharge Prescriptions    OXYCODONE (ROXICODONE) 5 IMMEDIATE RELEASE TABLET    Take 1 tablet (5 mg total) by mouth every 4 (four) hours as needed for moderate pain for up to 6 days Max Daily Amount: 30 mg       Start Date: 1/31/2025 End Date: 2/6/2025       Order Dose: 5 mg       Quantity: 6 tablet    Refills: 0     No discharge procedures on file.  ED SEPSIS DOCUMENTATION   Time reflects when diagnosis was documented in both MDM as applicable and the Disposition within this note       Time User Action Codes Description Comment    1/31/2025  3:12 AM Han Geller [L03.211] Facial cellulitis     1/31/2025  3:12 AM Han Geller [R22.0] Right facial swelling                  Han Geller,   01/31/25 0331

## 2025-04-08 ENCOUNTER — APPOINTMENT (OUTPATIENT)
Dept: URGENT CARE | Facility: CLINIC | Age: 25
End: 2025-04-08

## 2025-04-08 ENCOUNTER — APPOINTMENT (OUTPATIENT)
Dept: URGENT CARE | Age: 25
End: 2025-04-08